# Patient Record
Sex: FEMALE | Race: WHITE | NOT HISPANIC OR LATINO | Employment: OTHER | URBAN - METROPOLITAN AREA
[De-identification: names, ages, dates, MRNs, and addresses within clinical notes are randomized per-mention and may not be internally consistent; named-entity substitution may affect disease eponyms.]

---

## 2017-01-03 ENCOUNTER — TELEPHONE (OUTPATIENT)
Dept: GYNECOLOGIC ONCOLOGY | Facility: CLINIC | Age: 62
End: 2017-01-03

## 2017-01-03 NOTE — TELEPHONE ENCOUNTER
Patient was called by Extern Student Case Pelaez. Patient was informed of normal bone density results. V/u and was grateful of results.

## 2017-01-18 RX ORDER — ESTRADIOL 0.1 MG/D
FILM, EXTENDED RELEASE TRANSDERMAL
OUTPATIENT
Start: 2017-01-18

## 2017-01-24 RX ORDER — FLUOXETINE 10 MG/1
CAPSULE ORAL
Qty: 90 CAPSULE | OUTPATIENT
Start: 2017-01-24

## 2017-07-17 RX ORDER — FLUOXETINE 10 MG/1
10 CAPSULE ORAL DAILY
Qty: 90 CAPSULE | Refills: 4 | Status: SHIPPED | OUTPATIENT
Start: 2017-07-17 | End: 2018-09-24 | Stop reason: SDUPTHER

## 2017-07-17 RX ORDER — ESTRADIOL 0.1 MG/D
1 FILM, EXTENDED RELEASE TRANSDERMAL 2 TIMES WEEKLY
Qty: 24 PATCH | Refills: 4 | Status: SHIPPED | OUTPATIENT
Start: 2017-07-17 | End: 2018-07-24 | Stop reason: SDUPTHER

## 2017-09-26 ENCOUNTER — OFFICE VISIT (OUTPATIENT)
Dept: FAMILY MEDICINE CLINIC | Facility: CLINIC | Age: 62
End: 2017-09-26

## 2017-09-26 VITALS
HEART RATE: 86 BPM | WEIGHT: 154 LBS | SYSTOLIC BLOOD PRESSURE: 150 MMHG | TEMPERATURE: 98.3 F | OXYGEN SATURATION: 97 % | RESPIRATION RATE: 20 BRPM | BODY MASS INDEX: 28.34 KG/M2 | HEIGHT: 62 IN | DIASTOLIC BLOOD PRESSURE: 92 MMHG

## 2017-09-26 DIAGNOSIS — R05.9 COUGH: ICD-10-CM

## 2017-09-26 DIAGNOSIS — E03.9 HYPOTHYROIDISM, UNSPECIFIED TYPE: ICD-10-CM

## 2017-09-26 DIAGNOSIS — I10 ESSENTIAL HYPERTENSION: ICD-10-CM

## 2017-09-26 DIAGNOSIS — M19.90 ARTHRITIS: ICD-10-CM

## 2017-09-26 DIAGNOSIS — R53.83 FATIGUE, UNSPECIFIED TYPE: ICD-10-CM

## 2017-09-26 DIAGNOSIS — Z11.59 NEED FOR HEPATITIS C SCREENING TEST: ICD-10-CM

## 2017-09-26 DIAGNOSIS — J01.00 ACUTE MAXILLARY SINUSITIS, RECURRENCE NOT SPECIFIED: Primary | ICD-10-CM

## 2017-09-26 DIAGNOSIS — E78.5 HYPERLIPIDEMIA, UNSPECIFIED HYPERLIPIDEMIA TYPE: ICD-10-CM

## 2017-09-26 DIAGNOSIS — E55.9 HYPOVITAMINOSIS D: ICD-10-CM

## 2017-09-26 PROBLEM — F41.1 ANXIETY, GENERALIZED: Status: ACTIVE | Noted: 2017-09-26

## 2017-09-26 PROBLEM — M50.30 DDD (DEGENERATIVE DISC DISEASE), CERVICAL: Status: ACTIVE | Noted: 2017-09-26

## 2017-09-26 LAB
25(OH)D3 SERPL-MCNC: 36.8 NG/ML
ALBUMIN SERPL-MCNC: 4.4 G/DL (ref 3.2–4.8)
ALBUMIN/GLOB SERPL: 2 G/DL (ref 1.5–2.5)
ALP SERPL-CCNC: 74 U/L (ref 25–100)
ALT SERPL W P-5'-P-CCNC: 30 U/L (ref 7–40)
ANION GAP SERPL CALCULATED.3IONS-SCNC: 12 MMOL/L (ref 3–11)
ARTICHOKE IGE QN: 127 MG/DL (ref 0–130)
AST SERPL-CCNC: 24 U/L (ref 0–33)
BILIRUB BLD-MCNC: NEGATIVE MG/DL
BILIRUB SERPL-MCNC: 0.6 MG/DL (ref 0.3–1.2)
BUN BLD-MCNC: 11 MG/DL (ref 9–23)
BUN/CREAT SERPL: 18.3 (ref 7–25)
CALCIUM SPEC-SCNC: 9.3 MG/DL (ref 8.7–10.4)
CHLORIDE SERPL-SCNC: 105 MMOL/L (ref 99–109)
CHOLEST SERPL-MCNC: 200 MG/DL (ref 0–200)
CLARITY, POC: CLEAR
CO2 SERPL-SCNC: 21 MMOL/L (ref 20–31)
COLOR UR: YELLOW
CREAT BLD-MCNC: 0.6 MG/DL (ref 0.6–1.3)
CRP SERPL-MCNC: 0.44 MG/DL (ref 0–1)
DEPRECATED RDW RBC AUTO: 43.7 FL (ref 37–54)
ERYTHROCYTE [DISTWIDTH] IN BLOOD BY AUTOMATED COUNT: 12.4 % (ref 11.3–14.5)
ERYTHROCYTE [SEDIMENTATION RATE] IN BLOOD: 17 MM/HR (ref 0–30)
GFR SERPL CREATININE-BSD FRML MDRD: 102 ML/MIN/1.73
GLOBULIN UR ELPH-MCNC: 2.2 GM/DL
GLUCOSE BLD-MCNC: 88 MG/DL (ref 70–100)
GLUCOSE UR STRIP-MCNC: NEGATIVE MG/DL
HBA1C MFR BLD: 5.5 % (ref 4.8–5.6)
HCT VFR BLD AUTO: 42.1 % (ref 34.5–44)
HCV AB SER DONR QL: NORMAL
HDLC SERPL-MCNC: 64 MG/DL (ref 40–60)
HGB BLD-MCNC: 13.8 G/DL (ref 11.5–15.5)
KETONES UR QL: NEGATIVE
LEUKOCYTE EST, POC: NEGATIVE
MCH RBC QN AUTO: 31.3 PG (ref 27–31)
MCHC RBC AUTO-ENTMCNC: 32.8 G/DL (ref 32–36)
MCV RBC AUTO: 95.5 FL (ref 80–99)
NITRITE UR-MCNC: NEGATIVE MG/ML
PH UR: 7 [PH] (ref 5–8)
PLATELET # BLD AUTO: 235 10*3/MM3 (ref 150–450)
PMV BLD AUTO: 10.8 FL (ref 6–12)
POTASSIUM BLD-SCNC: 4.2 MMOL/L (ref 3.5–5.5)
PROT SERPL-MCNC: 6.6 G/DL (ref 5.7–8.2)
PROT UR STRIP-MCNC: NEGATIVE MG/DL
RBC # BLD AUTO: 4.41 10*6/MM3 (ref 3.89–5.14)
RBC # UR STRIP: NEGATIVE /UL
SODIUM BLD-SCNC: 138 MMOL/L (ref 132–146)
SP GR UR: 1.02 (ref 1–1.03)
TRIGL SERPL-MCNC: 129 MG/DL (ref 0–150)
TSH SERPL DL<=0.05 MIU/L-ACNC: 2.86 MIU/ML (ref 0.35–5.35)
UROBILINOGEN UR QL: NORMAL
VIT B12 BLD-MCNC: 249 PG/ML (ref 211–911)
WBC NRBC COR # BLD: 6.41 10*3/MM3 (ref 3.5–10.8)

## 2017-09-26 PROCEDURE — 86803 HEPATITIS C AB TEST: CPT | Performed by: NURSE PRACTITIONER

## 2017-09-26 PROCEDURE — 82607 VITAMIN B-12: CPT | Performed by: NURSE PRACTITIONER

## 2017-09-26 PROCEDURE — 86431 RHEUMATOID FACTOR QUANT: CPT | Performed by: NURSE PRACTITIONER

## 2017-09-26 PROCEDURE — 80053 COMPREHEN METABOLIC PANEL: CPT | Performed by: NURSE PRACTITIONER

## 2017-09-26 PROCEDURE — 99214 OFFICE O/P EST MOD 30 MIN: CPT | Performed by: NURSE PRACTITIONER

## 2017-09-26 PROCEDURE — 85027 COMPLETE CBC AUTOMATED: CPT | Performed by: NURSE PRACTITIONER

## 2017-09-26 PROCEDURE — 84443 ASSAY THYROID STIM HORMONE: CPT | Performed by: NURSE PRACTITIONER

## 2017-09-26 PROCEDURE — 86140 C-REACTIVE PROTEIN: CPT | Performed by: NURSE PRACTITIONER

## 2017-09-26 PROCEDURE — 83036 HEMOGLOBIN GLYCOSYLATED A1C: CPT | Performed by: NURSE PRACTITIONER

## 2017-09-26 PROCEDURE — 85652 RBC SED RATE AUTOMATED: CPT | Performed by: NURSE PRACTITIONER

## 2017-09-26 PROCEDURE — 81003 URINALYSIS AUTO W/O SCOPE: CPT | Performed by: NURSE PRACTITIONER

## 2017-09-26 PROCEDURE — 82306 VITAMIN D 25 HYDROXY: CPT | Performed by: NURSE PRACTITIONER

## 2017-09-26 PROCEDURE — 80061 LIPID PANEL: CPT | Performed by: NURSE PRACTITIONER

## 2017-09-26 PROCEDURE — 36415 COLL VENOUS BLD VENIPUNCTURE: CPT | Performed by: NURSE PRACTITIONER

## 2017-09-26 RX ORDER — HYDROCHLOROTHIAZIDE 12.5 MG/1
12.5 CAPSULE, GELATIN COATED ORAL DAILY
Qty: 30 CAPSULE | Refills: 1 | Status: SHIPPED | OUTPATIENT
Start: 2017-09-26 | End: 2017-10-17 | Stop reason: SDUPTHER

## 2017-09-26 RX ORDER — PREDNISONE 10 MG/1
TABLET ORAL SEE ADMIN INSTRUCTIONS
Qty: 21 TABLET | Refills: 0 | Status: SHIPPED | OUTPATIENT
Start: 2017-09-26 | End: 2017-10-02

## 2017-09-26 RX ORDER — AZELASTINE 1 MG/ML
2 SPRAY, METERED NASAL 2 TIMES DAILY
Qty: 1 EACH | Refills: 0 | Status: SHIPPED | OUTPATIENT
Start: 2017-09-26 | End: 2018-10-25

## 2017-09-26 RX ORDER — BENZONATATE 200 MG/1
200 CAPSULE ORAL 3 TIMES DAILY PRN
Qty: 30 CAPSULE | Refills: 0 | Status: SHIPPED | OUTPATIENT
Start: 2017-09-26 | End: 2017-10-03

## 2017-09-26 RX ORDER — AZITHROMYCIN 250 MG/1
TABLET, FILM COATED ORAL
Qty: 6 TABLET | Refills: 0 | Status: SHIPPED | OUTPATIENT
Start: 2017-09-26 | End: 2017-10-01

## 2017-09-26 NOTE — PATIENT INSTRUCTIONS
"Low-Sodium Eating Plan  Sodium raises blood pressure and causes water to be held in the body. Getting less sodium from food will help lower your blood pressure, reduce any swelling, and protect your heart, liver, and kidneys. We get sodium by adding salt (sodium chloride) to food. Most of our sodium comes from canned, boxed, and frozen foods. Restaurant foods, fast foods, and pizza are also very high in sodium. Even if you take medicine to lower your blood pressure or to reduce fluid in your body, getting less sodium from your food is important.  WHAT IS MY PLAN?  Most people should limit their sodium intake to 2,300 mg a day. Your health care provider recommends that you limit your sodium intake to __________ a day.   WHAT DO I NEED TO KNOW ABOUT THIS EATING PLAN?  For the low-sodium eating plan, you will follow these general guidelines:  Choose foods with a % Daily Value for sodium of less than 5% (as listed on the food label).    Use salt-free seasonings or herbs instead of table salt or sea salt.    Check with your health care provider or pharmacist before using salt substitutes.    Eat fresh foods.  Eat more vegetables and fruits.  Limit canned vegetables. If you do use them, rinse them well to decrease the sodium.    Limit cheese to 1 oz (28 g) per day.     Eat lower-sodium products, often labeled as \"lower sodium\" or \"no salt added.\"  Avoid foods that contain monosodium glutamate (MSG). MSG is sometimes added to Chinese food and some canned foods.    Check food labels (Nutrition Facts labels) on foods to learn how much sodium is in one serving.  Eat more home-cooked food and less restaurant, buffet, and fast food.   When eating at a restaurant, ask that your food be prepared with less salt, or no salt if possible.    HOW DO I READ FOOD LABELS FOR SODIUM INFORMATION?  The Nutrition Facts label lists the amount of sodium in one serving of the food. If you eat more than one serving, you must multiply the listed " amount of sodium by the number of servings.  Food labels may also identify foods as:  Sodium free--Less than 5 mg in a serving.  Very low sodium--35 mg or less in a serving.  Low sodium--140 mg or less in a serving.  Light in sodium--50% less sodium in a serving. For example, if a food that usually has 300 mg of sodium is changed to become light in sodium, it will have 150 mg of sodium.  Reduced sodium--25% less sodium in a serving. For example, if a food that usually has 400 mg of sodium is changed to reduced sodium, it will have 300 mg of sodium.  WHAT FOODS CAN I EAT?  Grains   Low-sodium cereals, including oats, puffed wheat and rice, and shredded wheat cereals. Low-sodium crackers. Unsalted rice and pasta. Lower-sodium bread.   Vegetables   Frozen or fresh vegetables. Low-sodium or reduced-sodium canned vegetables. Low-sodium or reduced-sodium tomato sauce and paste. Low-sodium or reduced-sodium tomato and vegetable juices.   Fruits   Fresh, frozen, and canned fruit. Fruit juice.   Meat and Other Protein Products   Low-sodium canned tuna and salmon. Fresh or frozen meat, poultry, seafood, and fish. Lamb. Unsalted nuts. Dried beans, peas, and lentils without added salt. Unsalted canned beans. Homemade soups without salt. Eggs.   Dairy   Milk. Soy milk. Ricotta cheese. Low-sodium or reduced-sodium cheeses. Yogurt.   Condiments   Fresh and dried herbs and spices. Salt-free seasonings. Onion and garlic powders. Low-sodium varieties of mustard and ketchup. Fresh or refrigerated horseradish. Lemon juice.   Fats and Oils    Reduced-sodium salad dressings. Unsalted butter.    Other   Unsalted popcorn and pretzels.   The items listed above may not be a complete list of recommended foods or beverages. Contact your dietitian for more options.  WHAT FOODS ARE NOT RECOMMENDED?  Grains   Instant hot cereals. Bread stuffing, pancake, and biscuit mixes. Croutons. Seasoned rice or pasta mixes. Noodle soup cups. Boxed or frozen  macaroni and cheese. Self-rising flour. Regular salted crackers.  Vegetables   Regular canned vegetables. Regular canned tomato sauce and paste. Regular tomato and vegetable juices. Frozen vegetables in sauces. Salted French fries. Olives. Pickles. Relishes. Sauerkraut. Salsa.  Meat and Other Protein Products   Salted, canned, smoked, spiced, or pickled meats, seafood, or fish. Yip, ham, sausage, hot dogs, corned beef, chipped beef, and packaged luncheon meats. Salt pork. Jerky. Pickled herring. Anchovies, regular canned tuna, and sardines. Salted nuts.  Dairy   Processed cheese and cheese spreads. Cheese curds. Blue cheese and cottage cheese. Buttermilk.   Condiments   Onion and garlic salt, seasoned salt, table salt, and sea salt. Canned and packaged gravies. Worcestershire sauce. Tartar sauce. Barbecue sauce. Teriyaki sauce. Soy sauce, including reduced sodium. Steak sauce. Fish sauce. Oyster sauce. Cocktail sauce. Horseradish that you find on the shelf. Regular ketchup and mustard. Meat flavorings and tenderizers. Bouillon cubes. Hot sauce. Tabasco sauce. Marinades. Taco seasonings. Relishes.  Fats and Oils    Regular salad dressings. Salted butter. Margarine. Ghee. Yip fat.   Other   Potato and tortilla chips. Corn chips and puffs. Salted popcorn and pretzels. Canned or dried soups. Pizza. Frozen entrees and pot pies.    The items listed above may not be a complete list of foods and beverages to avoid. Contact your dietitian for more information.     This information is not intended to replace advice given to you by your health care provider. Make sure you discuss any questions you have with your health care provider.     Document Released: 06/09/2003 Document Revised: 01/08/2016 Document Reviewed: 10/22/2014  [employer] Interactive Patient Education ©2017 [employer]  Hypertension  Hypertension, commonly called high blood pressure, is when the force of blood pumping through your arteries is too strong.  Your arteries are the blood vessels that carry blood from your heart throughout your body. A blood pressure reading consists of a higher number over a lower number, such as 110/72. The higher number (systolic) is the pressure inside your arteries when your heart pumps. The lower number (diastolic) is the pressure inside your arteries when your heart relaxes. Ideally you want your blood pressure below 120/80.  Hypertension forces your heart to work harder to pump blood. Your arteries may become narrow or stiff. Having untreated or uncontrolled hypertension can cause heart attack, stroke, kidney disease, and other problems.  RISK FACTORS  Some risk factors for high blood pressure are controllable. Others are not.   Risk factors you cannot control include:   Race. You may be at higher risk if you are .  Age. Risk increases with age.  Gender. Men are at higher risk than women before age 45 years. After age 65, women are at higher risk than men.  Risk factors you can control include:  Not getting enough exercise or physical activity.  Being overweight.  Getting too much fat, sugar, calories, or salt in your diet.  Drinking too much alcohol.  SIGNS AND SYMPTOMS  Hypertension does not usually cause signs or symptoms. Extremely high blood pressure (hypertensive crisis) may cause headache, anxiety, shortness of breath, and nosebleed.  DIAGNOSIS  To check if you have hypertension, your health care provider will measure your blood pressure while you are seated, with your arm held at the level of your heart. It should be measured at least twice using the same arm. Certain conditions can cause a difference in blood pressure between your right and left arms. A blood pressure reading that is higher than normal on one occasion does not mean that you need treatment. If it is not clear whether you have high blood pressure, you may be asked to return on a different day to have your blood pressure checked again. Or, you  may be asked to monitor your blood pressure at home for 1 or more weeks.  TREATMENT  Treating high blood pressure includes making lifestyle changes and possibly taking medicine. Living a healthy lifestyle can help lower high blood pressure. You may need to change some of your habits.  Lifestyle changes may include:  Following the DASH diet. This diet is high in fruits, vegetables, and whole grains. It is low in salt, red meat, and added sugars.  Keep your sodium intake below 2,300 mg per day.  Getting at least 30-45 minutes of aerobic exercise at least 4 times per week.  Losing weight if necessary.  Not smoking.  Limiting alcoholic beverages.  Learning ways to reduce stress.  Your health care provider may prescribe medicine if lifestyle changes are not enough to get your blood pressure under control, and if one of the following is true:  You are 18-59 years of age and your systolic blood pressure is above 140.  You are 60 years of age or older, and your systolic blood pressure is above 150.  Your diastolic blood pressure is above 90.  You have diabetes, and your systolic blood pressure is over 140 or your diastolic blood pressure is over 90.  You have kidney disease and your blood pressure is above 140/90.  You have heart disease and your blood pressure is above 140/90.  Your personal target blood pressure may vary depending on your medical conditions, your age, and other factors.  HOME CARE INSTRUCTIONS  Have your blood pressure rechecked as directed by your health care provider.    Take medicines only as directed by your health care provider. Follow the directions carefully. Blood pressure medicines must be taken as prescribed. The medicine does not work as well when you skip doses. Skipping doses also puts you at risk for problems.  Do not smoke.    Monitor your blood pressure at home as directed by your health care provider.   SEEK MEDICAL CARE IF:   You think you are having a reaction to medicines taken.  You  have recurrent headaches or feel dizzy.  You have swelling in your ankles.  You have trouble with your vision.  SEEK IMMEDIATE MEDICAL CARE IF:  You develop a severe headache or confusion.  You have unusual weakness, numbness, or feel faint.  You have severe chest or abdominal pain.  You vomit repeatedly.  You have trouble breathing.  MAKE SURE YOU:   Understand these instructions.  Will watch your condition.  Will get help right away if you are not doing well or get worse.     This information is not intended to replace advice given to you by your health care provider. Make sure you discuss any questions you have with your health care provider.     Document Released: 12/18/2006 Document Revised: 05/03/2016 Document Reviewed: 10/10/2014  [employer] Interactive Patient Education ©2017 [employer]  Fatigue  Fatigue is feeling tired all of the time, a lack of energy, or a lack of motivation. Occasional or mild fatigue is often a normal response to activity or life in general. However, long-lasting (chronic) or extreme fatigue may indicate an underlying medical condition.  HOME CARE INSTRUCTIONS   Watch your fatigue for any changes. The following actions may help to lessen any discomfort you are feeling:  Talk to your health care provider about how much sleep you need each night. Try to get the required amount every night.  Take medicines only as directed by your health care provider.  Eat a healthy and nutritious diet. Ask your health care provider if you need help changing your diet.  Drink enough fluid to keep your urine clear or pale yellow.  Practice ways of relaxing, such as yoga, meditation, massage therapy, or acupuncture.  Exercise regularly.    Change situations that cause you stress. Try to keep your work and personal routine reasonable.  Do not abuse illegal drugs.  Limit alcohol intake to no more than 1 drink per day for nonpregnant women and 2 drinks per day for men. One drink equals 12 ounces of beer, 5  ounces of wine, or 1½ ounces of hard liquor.  Take a multivitamin, if directed by your health care provider.  SEEK MEDICAL CARE IF:   Your fatigue does not get better.  You have a fever.    You have unintentional weight loss or gain.  You have headaches.    You have difficulty:    Falling asleep.  Sleeping throughout the night.  You feel angry, guilty, anxious, or sad.     You are unable to have a bowel movement (constipation).    You skin is dry.     Your legs or another part of your body is swollen.    SEEK IMMEDIATE MEDICAL CARE IF:   You feel confused.    Your vision is blurry.  You feel faint or pass out.    You have a severe headache.    You have severe abdominal, pelvic, or back pain.    You have chest pain, shortness of breath, or an irregular or fast heartbeat.    You are unable to urinate or you urinate less than normal.    You develop abnormal bleeding, such as bleeding from the rectum, vagina, nose, lungs, or nipples.  You vomit blood.     You have thoughts about harming yourself or committing suicide.    You are worried that you might harm someone else.       This information is not intended to replace advice given to you by your health care provider. Make sure you discuss any questions you have with your health care provider.     Document Released: 10/14/2008 Document Revised: 04/10/2017 Document Reviewed: 04/21/2015  [employer] Interactive Patient Education ©2017 [employer]  Food Choices to Lower Your Triglycerides  Triglycerides are a type of fat in your blood. High levels of triglycerides can increase the risk of heart disease and stroke. If your triglyceride levels are high, the foods you eat and your eating habits are very important. Choosing the right foods can help lower your triglycerides.   WHAT GENERAL GUIDELINES DO I NEED TO FOLLOW?  Lose weight if you are overweight.    Limit or avoid alcohol.    Fill one half of your plate with vegetables and green salads.    Limit fruit to two servings a  "day. Choose fruit instead of juice.    Make one fourth of your plate whole grains. Look for the word \"whole\" as the first word in the ingredient list.  Fill one fourth of your plate with lean protein foods.  Enjoy fatty fish (such as salmon, mackerel, sardines, and tuna) three times a week.    Choose healthy fats.    Limit foods high in starch and sugar.  Eat more home-cooked food and less restaurant, buffet, and fast food.  Limit fried foods.  Cook foods using methods other than frying.  Limit saturated fats.  Check ingredient lists to avoid foods with partially hydrogenated oils (trans fats) in them.  WHAT FOODS CAN I EAT?   Grains  Whole grains, such as whole wheat or whole grain breads, crackers, cereals, and pasta. Unsweetened oatmeal, bulgur, barley, quinoa, or brown rice. Corn or whole wheat flour tortillas.   Vegetables  Fresh or frozen vegetables (raw, steamed, roasted, or grilled). Green salads.  Fruits  All fresh, canned (in natural juice), or frozen fruits.  Meat and Other Protein Products  Ground beef (85% or leaner), grass-fed beef, or beef trimmed of fat. Skinless chicken or turkey. Ground chicken or turkey. Pork trimmed of fat. All fish and seafood. Eggs. Dried beans, peas, or lentils. Unsalted nuts or seeds. Unsalted canned or dry beans.  Dairy  Low-fat dairy products, such as skim or 1% milk, 2% or reduced-fat cheeses, low-fat ricotta or cottage cheese, or plain low-fat yogurt.  Fats and Oils  Tub margarines without trans fats. Light or reduced-fat mayonnaise and salad dressings. Avocado. Safflower, olive, or canola oils. Natural peanut or almond butter.  The items listed above may not be a complete list of recommended foods or beverages. Contact your dietitian for more options.  WHAT FOODS ARE NOT RECOMMENDED?   Grains  White bread. White pasta. White rice. Cornbread. Bagels, pastries, and croissants. Crackers that contain trans fat.  Vegetables  White potatoes. Corn. Creamed or fried vegetables. " Vegetables in a cheese sauce.  Fruits  Dried fruits. Canned fruit in light or heavy syrup. Fruit juice.  Meat and Other Protein Products  Fatty cuts of meat. Ribs, chicken wings, melendez, sausage, bologna, salami, chitterlings, fatback, hot dogs, bratwurst, and packaged luncheon meats.  Dairy  Whole or 2% milk, cream, half-and-half, and cream cheese. Whole-fat or sweetened yogurt. Full-fat cheeses. Nondairy creamers and whipped toppings. Processed cheese, cheese spreads, or cheese curds.  Sweets and Desserts  Corn syrup, sugars, honey, and molasses. Candy. Jam and jelly. Syrup. Sweetened cereals. Cookies, pies, cakes, donuts, muffins, and ice cream.  Fats and Oils  Butter, stick margarine, lard, shortening, ghee, or melendez fat. Coconut, palm kernel, or palm oils.  Beverages  Alcohol. Sweetened drinks (such as sodas, lemonade, and fruit drinks or punches).  The items listed above may not be a complete list of foods and beverages to avoid. Contact your dietitian for more information.     This information is not intended to replace advice given to you by your health care provider. Make sure you discuss any questions you have with your health care provider.     Document Released: 10/05/2005 Document Revised: 01/08/2016 Document Reviewed: 10/22/2014  [employer] Interactive Patient Education ©2017 [employer]  Hypothyroidism  Hypothyroidism is a disorder of the thyroid. The thyroid is a large gland that is located in the lower front of the neck. The thyroid releases hormones that control how the body works. With hypothyroidism, the thyroid does not make enough of these hormones.  CAUSES  Causes of hypothyroidism may include:  Viral infections.  Pregnancy.  Your own defense system (immune system) attacking your thyroid.  Certain medicines.  Birth defects.  Past radiation treatments to your head or neck.  Past treatment with radioactive iodine.  Past surgical removal of part or all of your thyroid.  Problems with the gland  that is located in the center of your brain (pituitary).  SIGNS AND SYMPTOMS  Signs and symptoms of hypothyroidism may include:  Feeling as though you have no energy (lethargy).  Inability to tolerate cold.  Weight gain that is not explained by a change in diet or exercise habits.  Dry skin.  Coarse hair.  Menstrual irregularity.  Slowing of thought processes.  Constipation.  Sadness or depression.  DIAGNOSIS   Your health care provider may diagnose hypothyroidism with blood tests and ultrasound tests.  TREATMENT  Hypothyroidism is treated with medicine that replaces the hormones that your body does not make. After you begin treatment, it may take several weeks for symptoms to go away.  HOME CARE INSTRUCTIONS   Take medicines only as directed by your health care provider.  If you start taking any new medicines, tell your health care provider.  Keep all follow-up visits as directed by your health care provider. This is important. As your condition improves, your dosage needs may change. You will need to have blood tests regularly so that your health care provider can watch your condition.  SEEK MEDICAL CARE IF:  Your symptoms do not get better with treatment.  You are taking thyroid replacement medicine and:  You sweat excessively.  You have tremors.  You feel anxious.  You lose weight rapidly.  You cannot tolerate heat.  You have emotional swings.  You have diarrhea.  You feel weak.  SEEK IMMEDIATE MEDICAL CARE IF:   You develop chest pain.  You develop an irregular heartbeat.  You develop a rapid heartbeat.     This information is not intended to replace advice given to you by your health care provider. Make sure you discuss any questions you have with your health care provider.     Document Released: 12/18/2006 Document Revised: 01/08/2016 Document Reviewed: 05/05/2015  [employer] Interactive Patient Education ©2017 [employer]  Benzonatate capsules  What is this medicine?  BENZONATATE (katie GLENROY na almazan) is used to  treat cough.  This medicine may be used for other purposes; ask your health care provider or pharmacist if you have questions.  COMMON BRAND NAME(S): Tessalon Perles Leonorheidi  What should I tell my health care provider before I take this medicine?  They need to know if you have any of these conditions:  -kidney or liver disease  -an unusual or allergic reaction to benzonatate, anesthetics, other medicines, foods, dyes, or preservatives  -pregnant or trying to get pregnant  -breast-feeding  How should I use this medicine?  Take this medicine by mouth with a glass of water. Follow the directions on the prescription label. Avoid breaking, chewing, or sucking the capsule, as this can cause serious side effects. Take your medicine at regular intervals. Do not take your medicine more often than directed.  Talk to your pediatrician regarding the use of this medicine in children. While this drug may be prescribed for children as young as 10 years old for selected conditions, precautions do apply.  Overdosage: If you think you have taken too much of this medicine contact a poison control center or emergency room at once.  NOTE: This medicine is only for you. Do not share this medicine with others.  What if I miss a dose?  If you miss a dose, take it as soon as you can. If it is almost time for your next dose, take only that dose. Do not take double or extra doses.  What may interact with this medicine?  Do not take this medicine with any of the following medications:  -MAOIs like Carbex, Eldepryl, Marplan, Nardil, and Parnate  This list may not describe all possible interactions. Give your health care provider a list of all the medicines, herbs, non-prescription drugs, or dietary supplements you use. Also tell them if you smoke, drink alcohol, or use illegal drugs. Some items may interact with your medicine.  What should I watch for while using this medicine?  Tell your doctor if your symptoms do not improve or if they get  worse. If you have a high fever, skin rash, or headache, see your health care professional.  You may get drowsy or dizzy. Do not drive, use machinery, or do anything that needs mental alertness until you know how this medicine affects you. Do not sit or stand up quickly, especially if you are an older patient. This reduces the risk of dizzy or fainting spells.  What side effects may I notice from receiving this medicine?  Side effects that you should report to your doctor or health care professional as soon as possible:  -allergic reactions like skin rash, itching or hives, swelling of the face, lips, or tongue  -breathing problems  -chest pain  -confusion or hallucinations  -irregular heartbeat  -numbness of mouth or throat  -seizures  Side effects that usually do not require medical attention (report to your doctor or health care professional if they continue or are bothersome):  -burning feeling in the eyes  -constipation  -headache  -nasal congestion  -stomach upset  This list may not describe all possible side effects. Call your doctor for medical advice about side effects. You may report side effects to FDA at 7-406-FDA-8892.  Where should I keep my medicine?  Keep out of the reach of children.  Store at room temperature between 15 and 30 degrees C (59 and 86 degrees F). Keep tightly closed. Protect from light and moisture. Throw away any unused medicine after the expiration date.  NOTE: This sheet is a summary. It may not cover all possible information. If you have questions about this medicine, talk to your doctor, pharmacist, or health care provider.     © 2017, [employer]. (2009-03-18 14:52:56)  Cough, Adult  Coughing is a reflex that clears your throat and your airways. Coughing helps to heal and protect your lungs. It is normal to cough occasionally, but a cough that happens with other symptoms or lasts a long time may be a sign of a condition that needs treatment. A cough may last only 2-3  weeks (acute), or it may last longer than 8 weeks (chronic).  CAUSES  Coughing is commonly caused by:  · Breathing in substances that irritate your lungs.  · A viral or bacterial respiratory infection.  · Allergies.  · Asthma.  · Postnasal drip.  · Smoking.  · Acid backing up from the stomach into the esophagus (gastroesophageal reflux).  · Certain medicines.  · Chronic lung problems, including COPD (or rarely, lung cancer).  · Other medical conditions such as heart failure.  HOME CARE INSTRUCTIONS   Pay attention to any changes in your symptoms. Take these actions to help with your discomfort:  · Take medicines only as told by your health care provider.    If you were prescribed an antibiotic medicine, take it as told by your health care provider. Do not stop taking the antibiotic even if you start to feel better.    Talk with your health care provider before you take a cough suppressant medicine.  · Drink enough fluid to keep your urine clear or pale yellow.  · If the air is dry, use a cold steam vaporizer or humidifier in your bedroom or your home to help loosen secretions.  · Avoid anything that causes you to cough at work or at home.  · If your cough is worse at night, try sleeping in a semi-upright position.  · Avoid cigarette smoke. If you smoke, quit smoking. If you need help quitting, ask your health care provider.  · Avoid caffeine.  · Avoid alcohol.  · Rest as needed.  SEEK MEDICAL CARE IF:   · You have new symptoms.  · You cough up pus.  · Your cough does not get better after 2-3 weeks, or your cough gets worse.  · You cannot control your cough with suppressant medicines and you are losing sleep.  · You develop pain that is getting worse or pain that is not controlled with pain medicines.  · You have a fever.  · You have unexplained weight loss.  · You have night sweats.  SEEK IMMEDIATE MEDICAL CARE IF:  · You cough up blood.  · You have difficulty breathing.  · Your heartbeat is very fast.     This  information is not intended to replace advice given to you by your health care provider. Make sure you discuss any questions you have with your health care provider.     Document Released: 06/15/2012 Document Revised: 09/07/2016 Document Reviewed: 02/24/2016  [employer] Interactive Patient Education ©2017 [employer]  Sinusitis, Adult  Sinusitis is soreness and inflammation of your sinuses. Sinuses are hollow spaces in the bones around your face. Your sinuses are located:  · Around your eyes.  · In the middle of your forehead.  · Behind your nose.  · In your cheekbones.  Your sinuses and nasal passages are lined with a stringy fluid (mucus). Mucus normally drains out of your sinuses. When your nasal tissues become inflamed or swollen, the mucus can become trapped or blocked so air cannot flow through your sinuses. This allows bacteria, viruses, and funguses to grow, which leads to infection.  Sinusitis can develop quickly and last for 7-10 days (acute) or for more than 12 weeks (chronic). Sinusitis often develops after a cold.  CAUSES  This condition is caused by anything that creates swelling in the sinuses or stops mucus from draining, including:  · Allergies.  · Asthma.  · Bacterial or viral infection.  · Abnormally shaped bones between the nasal passages.  · Nasal growths that contain mucus (nasal polyps).  · Narrow sinus openings.  · Pollutants, such as chemicals or irritants in the air.  · A foreign object stuck in the nose.  · A fungal infection. This is rare.  RISK FACTORS  The following factors may make you more likely to develop this condition:  · Having allergies or asthma.  · Having had a recent cold or respiratory tract infection.  · Having structural deformities or blockages in your nose or sinuses.  · Having a weak immune system.  · Doing a lot of swimming or diving.  · Overusing nasal sprays.  · Smoking.  SYMPTOMS  The main symptoms of this condition are pain and a feeling of pressure around the  affected sinuses. Other symptoms include:  · Upper toothache.  · Earache.  · Headache.  · Bad breath.  · Decreased sense of smell and taste.  · A cough that may get worse at night.  · Fatigue.  · Fever.  · Thick drainage from your nose. The drainage is often green and it may contain pus (purulent).  · Stuffy nose or congestion.  · Postnasal drip. This is when extra mucus collects in the throat or back of the nose.  · Swelling and warmth over the affected sinuses.  · Sore throat.  · Sensitivity to light.  DIAGNOSIS  This condition is diagnosed based on symptoms, a medical history, and a physical exam. To find out if your condition is acute or chronic, your health care provider may:  · Look in your nose for signs of nasal polyps.  · Tap over the affected sinus to check for signs of infection.  · View the inside of your sinuses using an imaging device that has a light attached (endoscope).  If your health care provider suspects that you have chronic sinusitis, you may also:  · Be tested for allergies.  · Have a sample of mucus taken from your nose (nasal culture) and checked for bacteria.  · Have a mucus sample examined to see if your sinusitis is related to an allergy.  If your sinusitis does not respond to treatment and it lasts longer than 8 weeks, you may have an MRI or CT scan to check your sinuses. These scans also help to determine how severe your infection is.  In rare cases, a bone biopsy may be done to rule out more serious types of fungal sinus disease.  TREATMENT  Treatment for sinusitis depends on the cause and whether your condition is chronic or acute. If a virus is causing your sinusitis, your symptoms will go away on their own within 10 days. You may be given medicines to relieve your symptoms, including:  · Topical nasal decongestants. They shrink swollen nasal passages and let mucus drain from your sinuses.  · Antihistamines. These drugs block inflammation that is triggered by allergies. This can  help to ease swelling in your nose and sinuses.  · Topical nasal corticosteroids. These are nasal sprays that ease inflammation and swelling in your nose and sinuses.  · Nasal saline washes. These rinses can help to get rid of thick mucus in your nose.  If your condition is caused by bacteria, you will be given an antibiotic medicine. If your condition is caused by a fungus, you will be given an antifungal medicine.  Surgery may be needed to correct underlying conditions, such as narrow nasal passages. Surgery may also be needed to remove polyps.  HOME CARE INSTRUCTIONS  Medicines  · Take, use, or apply over-the-counter and prescription medicines only as told by your health care provider. These may include nasal sprays.  · If you were prescribed an antibiotic medicine, take it as told by your health care provider. Do not stop taking the antibiotic even if you start to feel better.  Hydrate and Humidify  · Drink enough water to keep your urine clear or pale yellow. Staying hydrated will help to thin your mucus.  · Use a cool mist humidifier to keep the humidity level in your home above 50%.  · Inhale steam for 10-15 minutes, 3-4 times a day or as told by your health care provider. You can do this in the bathroom while a hot shower is running.  · Limit your exposure to cool or dry air.  Rest  · Rest as much as possible.  · Sleep with your head raised (elevated).  · Make sure to get enough sleep each night.  General Instructions  · Apply a warm, moist washcloth to your face 3-4 times a day or as told by your health care provider. This will help with discomfort.  · Wash your hands often with soap and water to reduce your exposure to viruses and other germs. If soap and water are not available, use hand .  · Do not smoke. Avoid being around people who are smoking (secondhand smoke).  · Keep all follow-up visits as told by your health care provider. This is important.  SEEK MEDICAL CARE IF:  · You have a  fever.  · Your symptoms get worse.  · Your symptoms do not improve within 10 days.  SEEK IMMEDIATE MEDICAL CARE IF:  · You have a severe headache.  · You have persistent vomiting.  · You have pain or swelling around your face or eyes.  · You have vision problems.  · You develop confusion.  · Your neck is stiff.  · You have trouble breathing.     This information is not intended to replace advice given to you by your health care provider. Make sure you discuss any questions you have with your health care provider.     Document Released: 12/18/2006 Document Revised: 04/10/2017 Document Reviewed: 10/12/2016  [employer] Interactive Patient Education ©2017 [employer]  Hydrochlorothiazide, HCTZ capsules or tablets  What is this medicine?  HYDROCHLOROTHIAZIDE (cesia droe klor oh THYE a zide) is a diuretic. It increases the amount of urine passed, which causes the body to lose salt and water. This medicine is used to treat high blood pressure. It is also reduces the swelling and water retention caused by various medical conditions, such as heart, liver, or kidney disease.  This medicine may be used for other purposes; ask your health care provider or pharmacist if you have questions.  COMMON BRAND NAME(S): Esidrix, Ezide, HydroDIURIL, Microzide, Oretic, Zide  What should I tell my health care provider before I take this medicine?  They need to know if you have any of these conditions:  -diabetes  -gout  -immune system problems, like lupus  -kidney disease or kidney stones  -liver disease  -pancreatitis  -small amount of urine or difficulty passing urine  -an unusual or allergic reaction to hydrochlorothiazide, sulfa drugs, other medicines, foods, dyes, or preservatives  -pregnant or trying to get pregnant  -breast-feeding  How should I use this medicine?  Take this medicine by mouth with a glass of water. Follow the directions on the prescription label. Take your medicine at regular intervals. Remember that you will need to  pass urine frequently after taking this medicine. Do not take your doses at a time of day that will cause you problems. Do not stop taking your medicine unless your doctor tells you to.  Talk to your pediatrician regarding the use of this medicine in children. Special care may be needed.  Overdosage: If you think you have taken too much of this medicine contact a poison control center or emergency room at once.  NOTE: This medicine is only for you. Do not share this medicine with others.  What if I miss a dose?  If you miss a dose, take it as soon as you can. If it is almost time for your next dose, take only that dose. Do not take double or extra doses.  What may interact with this medicine?  -cholestyramine  -colestipol  -digoxin  -dofetilide  -lithium  -medicines for blood pressure  -medicines for diabetes  -medicines that relax muscles for surgery  -other diuretics  -steroid medicines like prednisone or cortisone  This list may not describe all possible interactions. Give your health care provider a list of all the medicines, herbs, non-prescription drugs, or dietary supplements you use. Also tell them if you smoke, drink alcohol, or use illegal drugs. Some items may interact with your medicine.  What should I watch for while using this medicine?  Visit your doctor or health care professional for regular checks on your progress. Check your blood pressure as directed. Ask your doctor or health care professional what your blood pressure should be and when you should contact him or her.  You may need to be on a special diet while taking this medicine. Ask your doctor.  Check with your doctor or health care professional if you get an attack of severe diarrhea, nausea and vomiting, or if you sweat a lot. The loss of too much body fluid can make it dangerous for you to take this medicine.  You may get drowsy or dizzy. Do not drive, use machinery, or do anything that needs mental alertness until you know how this  medicine affects you. Do not stand or sit up quickly, especially if you are an older patient. This reduces the risk of dizzy or fainting spells. Alcohol may interfere with the effect of this medicine. Avoid alcoholic drinks.  This medicine may affect your blood sugar level. If you have diabetes, check with your doctor or health care professional before changing the dose of your diabetic medicine.  This medicine can make you more sensitive to the sun. Keep out of the sun. If you cannot avoid being in the sun, wear protective clothing and use sunscreen. Do not use sun lamps or tanning beds/booths.  What side effects may I notice from receiving this medicine?  Side effects that you should report to your doctor or health care professional as soon as possible:  -allergic reactions such as skin rash or itching, hives, swelling of the lips, mouth, tongue, or throat  -changes in vision  -chest pain  -eye pain  -fast or irregular heartbeat  -feeling faint or lightheaded, falls  -gout attack  -muscle pain or cramps  -pain or difficulty when passing urine  -pain, tingling, numbness in the hands or feet  -redness, blistering, peeling or loosening of the skin, including inside the mouth  -unusually weak or tired  Side effects that usually do not require medical attention (report to your doctor or health care professional if they continue or are bothersome):  -change in sex drive or performance  -dry mouth  -headache  -stomach upset  This list may not describe all possible side effects. Call your doctor for medical advice about side effects. You may report side effects to FDA at 8-225-FDA-3537.  Where should I keep my medicine?  Keep out of the reach of children.  Store at room temperature between 15 and 30 degrees C (59 and 86 degrees F). Do not freeze. Protect from light and moisture. Keep container closed tightly. Throw away any unused medicine after the expiration date.  NOTE: This sheet is a summary. It may not cover all  possible information. If you have questions about this medicine, talk to your doctor, pharmacist, or health care provider.     © 2017, [employer]. (2011-08-12 12:57:37)

## 2017-09-26 NOTE — PROGRESS NOTES
Subjective   Linn Bean is a 62 y.o. female.   Chief Complaint   Patient presents with   • Sinusitis     X 4 DAYS       Sinusitis   This is a new problem. The current episode started in the past 7 days. The problem has been gradually worsening since onset. There has been no fever. Her pain is at a severity of 3/10. The pain is mild. Associated symptoms include congestion, coughing, ear pain, headaches, a hoarse voice, sinus pressure, sneezing, a sore throat and swollen glands. Pertinent negatives include no chills, diaphoresis or neck pain. Past treatments include saline sprays (MUCINEX D). The treatment provided no relief.    Has been sick x3 days. Has purulent yellow drainage. Denies fever or chills. Spouse and co workers are sick with similar symptoms.     Went to Urgent care yesterday. Blood pressure was elevated. 167/92 at the office.     Strep test reports as negative. Received OTC list of medications to try.   Patient is here today due to feeling worse. She is planning an out of state trip this weekend.       The following portions of the patient's history were reviewed and updated as appropriate: allergies, current medications, past family history, past medical history, past social history, past surgical history and problem list.    Review of Systems   Constitutional: Positive for appetite change and fatigue. Negative for activity change, chills, diaphoresis and fever.   HENT: Positive for congestion, ear pain, hoarse voice, postnasal drip, rhinorrhea, sinus pain, sinus pressure, sneezing and sore throat.    Eyes:        Pressure behind left eye.   Respiratory: Positive for cough and chest tightness. Negative for choking and wheezing.         Occasionally productive cough - thick yellow.      Cardiovascular: Negative.  Negative for leg swelling.        Swelling to hands.      Gastrointestinal: Negative for abdominal distention, abdominal pain, anal bleeding, blood in stool, constipation, diarrhea, nausea  "and rectal pain.   Genitourinary: Negative.    Musculoskeletal: Positive for arthralgias and myalgias. Negative for neck pain.        Hands feel very tight. Had cortisone injections to three of the fingers last month.   Hx of arthritis. Parents both have crippling arthritis.      Skin: Negative.    Allergic/Immunologic: Positive for environmental allergies.   Neurological: Positive for headaches.       Objective   No Known Allergies  Visit Vitals   • /92 (BP Location: Right arm, Patient Position: Sitting, Cuff Size: Adult)   • Pulse 86   • Temp 98.3 °F (36.8 °C) (Oral)   • Resp 20   • Ht 62\" (157.5 cm)   • Wt 154 lb (69.9 kg)   • SpO2 97%   • BMI 28.17 kg/m2         Physical Exam   Constitutional: She is oriented to person, place, and time. She appears well-developed and well-nourished. She is cooperative. She appears ill.   HENT:   Head: Normocephalic.   Right Ear: Hearing, tympanic membrane, external ear and ear canal normal.   Left Ear: Hearing, tympanic membrane, external ear and ear canal normal.   Nose: Mucosal edema present. Right sinus exhibits maxillary sinus tenderness. Right sinus exhibits no frontal sinus tenderness. Left sinus exhibits maxillary sinus tenderness. Left sinus exhibits no frontal sinus tenderness.   Mouth/Throat: Uvula is midline and mucous membranes are normal. Posterior oropharyngeal erythema present. Tonsils are 0 on the right. Tonsils are 0 on the left.   Yellow purulent drainage -      Eyes: Conjunctivae and lids are normal. Pupils are equal, round, and reactive to light.   Cardiovascular: Normal rate, regular rhythm, S1 normal, S2 normal, normal heart sounds and normal pulses.    Pulmonary/Chest: Effort normal. She exhibits no tenderness.   Patient has a slight wheeze with coughing.      Abdominal: Soft.   Lymphadenopathy:     She has cervical adenopathy.   Neurological: She is alert and oriented to person, place, and time.   Skin: Skin is warm, dry and intact. No rash noted. "   Psychiatric: She has a normal mood and affect. Her behavior is normal.   Vitals reviewed.      Assessment/Plan   Linn was seen today for sinusitis.    Diagnoses and all orders for this visit:    Acute maxillary sinusitis, recurrence not specified  -     azithromycin (ZITHROMAX) 250 MG tablet; Take 2 tablets the first day, then 1 tablet daily for 4 days.  -     PredniSONE (DELTASONE) 10 MG (21) tablet pack; Take  by mouth See Admin Instructions for 6 days.  -     Comprehensive Metabolic Panel  -     CBC (No Diff)    Essential hypertension  -     hydrochlorothiazide (MICROZIDE) 12.5 MG capsule; Take 1 capsule by mouth Daily.  -     Comprehensive Metabolic Panel  -     POC Urinalysis Dipstick, Automated    Cough  -     benzonatate (TESSALON) 200 MG capsule; Take 1 capsule by mouth 3 (Three) Times a Day As Needed for Cough for up to 7 days.    Arthritis  -     Comprehensive Metabolic Panel  -     C-reactive Protein  -     Rheumatoid Factor  -     Sedimentation Rate    Hypothyroidism, unspecified type  -     Comprehensive Metabolic Panel  -     TSH    Hyperlipidemia, unspecified hyperlipidemia type  -     Comprehensive Metabolic Panel  -     Lipid Panel  -     Hemoglobin A1c    Hypovitaminosis D  -     Vitamin D 25 Hydroxy    Fatigue, unspecified type  -     Vitamin D 25 Hydroxy  -     Vitamin B12  -     POC Urinalysis Dipstick, Automated    Need for hepatitis C screening test  -     Hepatitis C Antibody    Patient is being started on HCTZ 12.5 mg po QD for blood pressure management. Discussed medication. Discussed low sodium diet.   Exercise 30 minutes 3-4 times a week.   Patient is fasting. Lipid profile drawn.  Patient has appointment with Dr. Luo on 10/17/2017.   Follow up - review labs.   Follow up blood pressure - reevaluate HCTZ.     Patient would like an influenza vaccine next appointment. Discussed Tdap and zoster vaccines.       Follow up as needed if your symptoms worsen or you fail to improve.             Ashwini Ziegler, APRN

## 2017-09-27 LAB — RHEUMATOID FACT SERPL-ACNC: NEGATIVE [IU]/ML

## 2017-10-09 DIAGNOSIS — Z80.3 FAMILY HISTORY OF BREAST CANCER: ICD-10-CM

## 2017-10-09 DIAGNOSIS — Z80.41 FAMILY HISTORY OF OVARIAN CANCER: Primary | ICD-10-CM

## 2017-10-17 ENCOUNTER — OFFICE VISIT (OUTPATIENT)
Dept: FAMILY MEDICINE CLINIC | Facility: CLINIC | Age: 62
End: 2017-10-17

## 2017-10-17 VITALS
HEIGHT: 63 IN | WEIGHT: 154.8 LBS | HEART RATE: 86 BPM | OXYGEN SATURATION: 98 % | DIASTOLIC BLOOD PRESSURE: 70 MMHG | RESPIRATION RATE: 18 BRPM | BODY MASS INDEX: 27.43 KG/M2 | SYSTOLIC BLOOD PRESSURE: 124 MMHG

## 2017-10-17 DIAGNOSIS — Z00.00 HEALTHCARE MAINTENANCE: Primary | ICD-10-CM

## 2017-10-17 DIAGNOSIS — I10 ESSENTIAL HYPERTENSION: ICD-10-CM

## 2017-10-17 PROBLEM — F41.1 ANXIETY, GENERALIZED: Status: RESOLVED | Noted: 2017-09-26 | Resolved: 2017-10-17

## 2017-10-17 PROBLEM — E78.5 HLD (HYPERLIPIDEMIA): Status: RESOLVED | Noted: 2017-09-26 | Resolved: 2017-10-17

## 2017-10-17 PROBLEM — M50.30 DDD (DEGENERATIVE DISC DISEASE), CERVICAL: Status: RESOLVED | Noted: 2017-09-26 | Resolved: 2017-10-17

## 2017-10-17 PROBLEM — E03.9 ADULT HYPOTHYROIDISM: Status: RESOLVED | Noted: 2017-09-26 | Resolved: 2017-10-17

## 2017-10-17 LAB
ANION GAP SERPL CALCULATED.3IONS-SCNC: 8 MMOL/L (ref 3–11)
BUN BLD-MCNC: 13 MG/DL (ref 9–23)
BUN/CREAT SERPL: 21.7 (ref 7–25)
CALCIUM SPEC-SCNC: 9.6 MG/DL (ref 8.7–10.4)
CHLORIDE SERPL-SCNC: 101 MMOL/L (ref 99–109)
CO2 SERPL-SCNC: 29 MMOL/L (ref 20–31)
CREAT BLD-MCNC: 0.6 MG/DL (ref 0.6–1.3)
GFR SERPL CREATININE-BSD FRML MDRD: 102 ML/MIN/1.73
GLUCOSE BLD-MCNC: 91 MG/DL (ref 70–100)
POTASSIUM BLD-SCNC: 4.2 MMOL/L (ref 3.5–5.5)
SODIUM BLD-SCNC: 138 MMOL/L (ref 132–146)

## 2017-10-17 PROCEDURE — 93000 ELECTROCARDIOGRAM COMPLETE: CPT | Performed by: FAMILY MEDICINE

## 2017-10-17 PROCEDURE — 36415 COLL VENOUS BLD VENIPUNCTURE: CPT | Performed by: FAMILY MEDICINE

## 2017-10-17 PROCEDURE — 90736 HZV VACCINE LIVE SUBQ: CPT | Performed by: FAMILY MEDICINE

## 2017-10-17 PROCEDURE — 90471 IMMUNIZATION ADMIN: CPT | Performed by: FAMILY MEDICINE

## 2017-10-17 PROCEDURE — 80048 BASIC METABOLIC PNL TOTAL CA: CPT | Performed by: FAMILY MEDICINE

## 2017-10-17 PROCEDURE — 99396 PREV VISIT EST AGE 40-64: CPT | Performed by: FAMILY MEDICINE

## 2017-10-17 RX ORDER — HYDROCHLOROTHIAZIDE 12.5 MG/1
12.5 CAPSULE, GELATIN COATED ORAL DAILY
Qty: 90 CAPSULE | Refills: 3 | Status: SHIPPED | OUTPATIENT
Start: 2017-10-17 | End: 2018-09-24 | Stop reason: SDUPTHER

## 2017-10-17 NOTE — PROGRESS NOTES
Subjective   Linn Bean is a 62 y.o. female.     History of Present Illness   She is here for her annual fasting wellness evaluation.  She just had blood work in September.  She is amendable to updating her immunizations.  She is also here to discuss her hypertension.  She is tolerating her medication well with no adverse events.  She reports improved blood pressure readings.  She continues to follow a heart healthy diet and exercise routinely.  She is needing a refill of her medication.    Review of Systems   Constitutional: Negative.    HENT: Negative.    Eyes: Negative.    Respiratory: Negative.    Cardiovascular: Negative.    Gastrointestinal: Negative.    Endocrine: Negative.    Genitourinary: Negative.    Musculoskeletal: Negative.    Skin: Negative.    Allergic/Immunologic: Negative.    Neurological: Negative.    Hematological: Negative.    Psychiatric/Behavioral: Negative.        Objective   Physical Exam   Constitutional: She is oriented to person, place, and time. She appears well-developed and well-nourished. She is cooperative.   HENT:   Head: Normocephalic and atraumatic.   Right Ear: Hearing and external ear normal.   Left Ear: Hearing and external ear normal.   Nose: Nose normal.   Mouth/Throat: Uvula is midline, oropharynx is clear and moist and mucous membranes are normal.   Eyes: Conjunctivae and EOM are normal. Pupils are equal, round, and reactive to light. No scleral icterus.   Neck: Trachea normal and normal range of motion. Neck supple. No JVD present. Carotid bruit is not present. No thyromegaly present.   Cardiovascular: Normal rate, regular rhythm, normal heart sounds and intact distal pulses.    Pulmonary/Chest: Effort normal and breath sounds normal.   Abdominal: Soft. Bowel sounds are normal. There is no hepatosplenomegaly. There is no tenderness.   Musculoskeletal: Normal range of motion.   Lymphadenopathy:     She has no cervical adenopathy.   Neurological: She is alert and oriented  to person, place, and time. She has normal strength and normal reflexes. No sensory deficit. Gait normal.   Skin: Skin is warm and dry.   Psychiatric: She has a normal mood and affect. Her speech is normal and behavior is normal. Judgment and thought content normal. Cognition and memory are normal.   Nursing note and vitals reviewed.      ECG 12 Lead  Date/Time: 10/17/2017 9:10 AM  Performed by: KVNG HONEYCUTT  Authorized by: KVNG HONEYCUTT   Comparison: not compared with previous ECG   Previous ECG: no previous ECG available  Rhythm: sinus rhythm  Rate: normal  BPM: 68  Conduction: conduction normal  ST Segments: ST segments normal  T Waves: T waves normal  QRS axis: normal  Clinical impression: normal ECG          September 2017 lab results reviewed.    Assessment/Plan   Diagnoses and all orders for this visit:    Healthcare maintenance  -     Varicella-Zoster Vaccine Subcutaneous  - Vaccine counseling and current VIS is provided for immunizations administered today.  -     Ambulatory Referral For Screening Colonoscopy  - Labs current    Essential hypertension  -     ECG 12 Lead  -     Basic Metabolic Panel  -     hydrochlorothiazide (MICROZIDE) 12.5 MG capsule; Take 1 capsule by mouth Daily.  - Blood pressure well controlled today  - Healthy heart diet  - Daily aerobic exercise  - Routine blood pressure monitoring  - [place] Heart Disease and Stroke Prevention Task Force pamphlet reviewed and administered.    The patient is here for a health maintenance visit.  Currently, the patient consumes a healthy diet and has an adequate exercise regimen. Screening lab work is ordered.  Immunizations are updated today.  Advice and education is given regarding nutrition, aerobic exercise, routine dental evaluations, routine eye exams, reproductive health, cardiovascular risk reduction, sunscreen use, self skin examination (annual dermatology evaluations) and seat belt use (general overall safety).  Further recommendations  after lab evaluation.  Annual wellness evaluations recommended.

## 2017-10-19 ENCOUNTER — LAB (OUTPATIENT)
Dept: LAB | Facility: HOSPITAL | Age: 62
End: 2017-10-19

## 2017-10-19 ENCOUNTER — CLINICAL SUPPORT (OUTPATIENT)
Dept: GENETICS | Facility: HOSPITAL | Age: 62
End: 2017-10-19

## 2017-10-19 DIAGNOSIS — Z80.3 FAMILY HISTORY OF BREAST CANCER: ICD-10-CM

## 2017-10-19 DIAGNOSIS — Z13.79 GENETIC TESTING: Primary | ICD-10-CM

## 2017-10-19 DIAGNOSIS — Z80.41 FAMILY HISTORY OF OVARIAN CANCER: Primary | ICD-10-CM

## 2017-10-20 NOTE — PROGRESS NOTES
Linn Bean is a 62-year old female who was seen for genetic counseling due to a family history of breast cancer and ovarian cancer.  Ms. Bean does not have a personal history of cancer.  Ms. Bean had a total hysterectomy and oophorectomy at age 45.  She was 16 years old at menarche and 33 years old when she had her first child.  Ms. Bean was interested in discussing her risk for a hereditary cancer syndrome, and decided to pursue genetic testing.   Ms. Bean opted to pursue comprehensive testing via the [employer]va[employer] panel ordered through [facility] [employer].   This panel evaluates for mutations in 24 genes related with breast and/or ovarian cancer (CLARA, BARD1, BRCA1, BRCA2, BRIP1, CDH1, CHEK2, EPCAM, MLH1, MRE11A, MSH2, MSH6, MUTYH, NBN, NF1, PALB2, PMS2, PTEN, RAD50, RAD51C, RAD51D, SMARCA4, STK11, and TP53). Results are expected in 2-3 weeks.    PERTINENT FAMILY HISTORY: (See pedigree)       RISK ASSESSMENT:  Ms. Bean’s family history of breast and ovarian cancer raises the question of a hereditary cancer syndrome. We discussed BRCA1/2 testing as well as the option of pursuing a panel that would test for other genes known to impact breast and ovarian cancer risk in addition to BRCA1/2.  Ms. Bean meets NCCN guidelines criteria for BRCA1/2 testing based on having a first degree relative with an ovarian cancer. This risk assessment is based on the family history information provided at the time of the appointment.  The assessment could change in the future should new information be obtained.    If testing comes back negative, Ms. Bean’s breast cancer risk should be estimated based on her family history.  Based on her family history and computer modeling (Susie-Donal), Ms. Bean has a lifetime risk for breast cancer of up to 13% which is elevated over the average 60-year-old woman’s risk for breast cancer of 7.3%.  A risk greater than 20% is considered increased risk and warrants consideration of increased  screening, and Ms. Bean does not fall into that category.  This risk assessment is based on the family history information provided at the time of the appointment.  The assessment could change in the future should new information be obtained, specifically if a hereditary cancer syndrome is identified in another family member.    GENETIC COUNSELING (40 minutes): We reviewed the family history information in detail.  Cases of breast cancer follow three general patterns: sporadic, familial, and hereditary.  While most cancer is sporadic, some cases appear to occur in family clusters.  These cases are said to be familial and account for 10-20% of breast cancer   cases.  Familial cases may be due to a combination of shared genes and environmental factors among family members.  In even fewer families, the cancer is said to be inherited, and the genes responsible for the cancer are known.      Family histories typical of hereditary cancer syndromes usually include multiple first- and second-degree relatives diagnosed with cancer types that define a syndrome.  These cases tend to be diagnosed at younger-than-expected ages and can be bilateral or multifocal.  The cancer in these families follows an autosomal dominant inheritance pattern, which indicates the likely presence of a mutation in a cancer susceptibility gene.  Children and siblings of an individual believed to carry this mutation have a 50% chance of inheriting that mutation, thereby inheriting the increased risk to develop cancer.  These mutations can be passed down from the maternal or the paternal lineage.    Hereditary breast cancer accounts for 5-10% of all cases of breast cancer.  A significant proportion of hereditary breast cancer can be attributed to mutations in the BRCA1 and BRCA2 genes.  Mutations in these genes confer an increased risk for breast cancer, ovarian cancer, male breast cancer, prostate cancer and pancreatic cancer.  Women with a BRCA1 or  BRCA2 mutation who have already been diagnosed with breast cancer have a 40-60% lifetime risk of a second breast cancer.     There are other genes that are known to be associated with an increased risk for breast cancer.  Some of these genes have well defined cancer risks and established management guidelines.  Other genes that can be tested for have been more recently described, and there may be less data regarding the risks and therefore may not have established  management guidelines.  Based on Ms. Bean’s desire to get as much information as possible regarding her personal risks and potential risks for her family, she opted to pursue testing through a panel that would look at several other genes known to increase the risk for breast cancer and also ovarian cancer.    Genetic Testing:  The risks, benefits and limitations of genetic testing and implications for clinical management following testing were reviewed.  DNA test results can influence decisions regarding screening, prevention and surgical management.  Genetic testing can have significant psychological implications for both individuals and families.  Also discussed was the possibility of employment and insurance discrimination based on genetic test results and the laws in place to prevent this (ERENDIRA), as well as the limitations of these laws.    We discussed panel testing, which would involve testing for BRCA1/2 as well as several other genes at the same time (other genes include CLARA, BARD1, BRCA1, BRCA2, BRIP1, CDH1, CHEK2, EPCAM, MLH1, MRE11A, MSH2, MSH6, MUTYH, NBN, NF1, PALB2, PMS2, PTEN, RAD50, RAD51C, RAD51D, SMARCA4, STK11, and TP53). The benefits and limitations of genetic testing were discussed and Ms. Bean decided to pursue testing via the panel.  We also discussed the limitations in testing an unaffected individual.  In Ms. Bean’s case, we discussed that it would be informative for her mother to have testing at some point, however Ms.  Vikas was still offered testing due to meeting NCCN guidelines criteria for testing based on her paternal history.  The implications of a positive or negative test result were discussed. We discussed the possibility that, in some cases, genetic test results may be informative or may be ambiguous due to the identification of a genetic variant. These variants may or may not be associated with an increased cancer risk.  Given her family history, a negative test result does not eliminate all breast cancer risk to Ms. Bean or her relatives, although the risk would not be as high as it would with positive genetic testing.        PLAN: Genetic testing should be complete in 2-3 weeks, and Ms. Bean will be contacted by telephone to discuss the results.   In the meantime Ms. Bean is welcome to contact us at [phone] with any questions she may have.    Jyothi Lora MS, [facility] [place]       Certified Genetic Counselor

## 2017-11-06 ENCOUNTER — DOCUMENTATION (OUTPATIENT)
Dept: GENETICS | Facility: HOSPITAL | Age: 62
End: 2017-11-06

## 2017-12-19 PROBLEM — Z86.018 HISTORY OF UTERINE FIBROID: Status: ACTIVE | Noted: 2017-12-19

## 2017-12-20 ENCOUNTER — OFFICE VISIT (OUTPATIENT)
Dept: GYNECOLOGIC ONCOLOGY | Facility: CLINIC | Age: 62
End: 2017-12-20

## 2017-12-20 VITALS
DIASTOLIC BLOOD PRESSURE: 74 MMHG | RESPIRATION RATE: 14 BRPM | SYSTOLIC BLOOD PRESSURE: 133 MMHG | HEART RATE: 76 BPM | TEMPERATURE: 98.7 F | OXYGEN SATURATION: 96 % | BODY MASS INDEX: 26.75 KG/M2 | HEIGHT: 63 IN | WEIGHT: 151 LBS

## 2017-12-20 DIAGNOSIS — Z86.018 HISTORY OF UTERINE FIBROID: ICD-10-CM

## 2017-12-20 DIAGNOSIS — Z80.41 FAMILY HISTORY OF OVARIAN CANCER: ICD-10-CM

## 2017-12-20 DIAGNOSIS — Z01.419 WELL WOMAN EXAM WITH ROUTINE GYNECOLOGICAL EXAM: Primary | ICD-10-CM

## 2017-12-20 PROCEDURE — 99396 PREV VISIT EST AGE 40-64: CPT | Performed by: NURSE PRACTITIONER

## 2017-12-20 NOTE — PROGRESS NOTES
GYN ONCOLOGY ANNUAL WELL WOMAN VISIT      Linn Bean  [id number]  1955      Chief Complaint: No chief complaint on file.        History of present illness:  Linn Bean is a 62 y.o. year old female who is here today for an annual exam. The patient has a history of fibroid uterus as well as a family history of ovarian cancer in her mother. Her sister was also recently diagnosed with breast cancer. She is completing radiation therapy here at [facility]. The patient elected to have genetic evaluation that was negative for underlying genetic mutation. She is feeling well today and denies significant changes in health history.  Her bowels and bladder are working well. She has had no vaginal bleeding or pelvic pain. Her mammogram is UTD and she has no new breast complaints today. She called to check on her colonoscopy, and was told she is due  for repeat exam. She had bone density completed 2016 for baseline that was normal.  She is using her Vivelle with good control of her vasomotor symptoms. She is wondering if it is safe to continue HRT in terms of breast cancer rist.     Cancer History:    No history exists.       Obstetric History:  OB History      Para Term  AB Living    2 2        SAB TAB Ectopic Multiple Live Births                 Menstrual History:     No LMP recorded. Patient has had a hysterectomy.          Past Medical History:   Diagnosis Date   • DDD (degenerative disc disease), cervical    • LUCILLE (generalized anxiety disorder)    • HLD (hyperlipidemia)    • HTN (hypertension)    • Hypothyroidism    • Osteoarthritis of hands, bilateral        Past Surgical History:   Procedure Laterality Date   • BREAST LUMPECTOMY Left    • BUNIONECTOMY     • COLONOSCOPY     • EYE PTOSIS REPAIR Bilateral 2017   • HYSTERECTOMY     • KNEE ARTHROSCOPY Left    • REPLACEMENT TOTAL KNEE Left    • TONSILLECTOMY     • TUBAL ABDOMINAL LIGATION          MEDICATIONS: The current medication list was reviewed and reconciled.     Allergies:  has No Known Allergies.    Family History   Problem Relation Age of Onset   • Asthma Mother    • Ovarian cancer Mother    • Hypothyroidism Mother    • Heart failure Father    • Prostate cancer Father    • Heart attack Father    • Breast cancer Sister        Health Maintenance:  Last mammogram was 6/20/2017. Last colonoscopy was 2015, with recommended follow-up in 5 year(s). Last pap smear was 12/21/2016, results were  normal PAP.    Review of Systems   Constitutional: Negative for fatigue, fever and unexpected weight change.   HENT: Negative for congestion, ear pain, hearing loss, sinus pressure and trouble swallowing.    Eyes: Negative for visual disturbance.   Respiratory: Negative for cough, chest tightness, shortness of breath and wheezing.    Cardiovascular: Negative for chest pain, palpitations and leg swelling.   Gastrointestinal: Negative for abdominal distention, abdominal pain, constipation, diarrhea, nausea and vomiting.   Endocrine: Negative for cold intolerance, heat intolerance, polydipsia, polyphagia and polyuria.   Genitourinary: Negative for difficulty urinating, dyspareunia, dysuria, frequency, hematuria, pelvic pain, urgency, vaginal bleeding, vaginal discharge and vaginal pain.   Musculoskeletal: Negative for arthralgias, gait problem, joint swelling and myalgias.   Skin: Negative for color change, pallor and rash.   Neurological: Negative for dizziness, seizures, syncope, weakness, light-headedness, numbness and headaches.   Hematological: Negative for adenopathy. Does not bruise/bleed easily.   Psychiatric/Behavioral: Negative for agitation, confusion, sleep disturbance and suicidal ideas. The patient is not nervous/anxious.        Physical Exam  General Appearance:  alert, cooperative, no apparent distress and appears stated age   Neurologic/Psychiatric: A&O x 3, gait steady, appropriate affect   HEENT:   Normocephalic, without obvious abnormality, mucous membranes moist   Neck: Supple, symmetrical, trachea midline, no adenopathy;  No thyromegaly, masses, or tenderness   Back:   Symmetric, no curvature, ROM normal, no CVA tenderness   Lungs:   Clear to auscultation bilaterally; respirations regular, even, and unlabored bilaterally   Heart:  Regular rate and rhythm, no murmurs appreciated   Breasts:  Symmetrical, no masses, no lesions and no nipple discharge   Abdomen:   Soft, non-tender, non-distended and no organomegaly   Lymph nodes: No cervical, supraclavicular, inguinal or axillary adenopathy noted   Extremities: Normal, atraumatic; no clubbing, cyanosis, or edema    Skin: No rashes, ulcers, or suspicious lesions noted   Pelvic: External Genitalia  without lesions or skin changes  Vagina  is pink, moist, without lesions.   Vaginal Cuff  Female Vaginal Cuff: smooth, intact, without visible lesions and pap obtained  Uterus  surgically absent  Ovaries  surgically absent bilaterallly  Parametria  smooth  Rectovaginal  Female rectovaginal: confirms no masses or bleeding and Hemoccult negative     ECOG Performance Status: 0 - Asymptomatic    Procedure Note:  No notes on file    Assessment and Plan:    Diagnoses and all orders for this visit:    Well woman exam with routine gynecological exam    History of uterine fibroid    Family history of ovarian cancer      The patient was instructed to call the office in 1 week for results of her pap smear.   The patient was instructed to call with vaginal bleeding, discharge, pelvic pain, change in bowel or bladder function, or any new symptoms for evaluation of her complaints.   Mamm done 6/2016  We will slowly begin to decrease her Vivelle dose from 0.1 to 0.075 mg patches.  If she tolerates this dose, we will continue to taper next year to 0.05 mg with the goal of using the lowest dose possible to control her postmenopausal symptoms.  Colonoscopy due 2020.  Genetic testing  negative for underlying mutation.   Bone density done 12/2016 for baseline study.   The patient will continue annual GYN care with DAYAMI [facility] in [facility].   Return in about 1 year (around 12/20/2018) for Annual Exam.      DAYAMI Branham      Note: Speech recognition transcription software was used to dictate portions of this document.  An attempt at proofreading has been made though minor errors in transcription may still be present.  Please do not hesitate to call our office with any questions.

## 2017-12-28 ENCOUNTER — TELEPHONE (OUTPATIENT)
Dept: OBSTETRICS AND GYNECOLOGY | Facility: CLINIC | Age: 62
End: 2017-12-28

## 2018-07-24 RX ORDER — ESTRADIOL 0.1 MG/D
FILM, EXTENDED RELEASE TRANSDERMAL
Refills: 4 | OUTPATIENT
Start: 2018-07-24

## 2018-07-24 RX ORDER — ESTRADIOL 0.1 MG/D
1 FILM, EXTENDED RELEASE TRANSDERMAL 2 TIMES WEEKLY
Qty: 24 PATCH | Refills: 4 | Status: SHIPPED | OUTPATIENT
Start: 2018-07-26 | End: 2018-09-24

## 2018-09-24 ENCOUNTER — OFFICE VISIT (OUTPATIENT)
Dept: FAMILY MEDICINE CLINIC | Facility: CLINIC | Age: 63
End: 2018-09-24

## 2018-09-24 VITALS
BODY MASS INDEX: 27 KG/M2 | WEIGHT: 152.4 LBS | SYSTOLIC BLOOD PRESSURE: 128 MMHG | OXYGEN SATURATION: 99 % | HEIGHT: 63 IN | HEART RATE: 92 BPM | DIASTOLIC BLOOD PRESSURE: 74 MMHG | RESPIRATION RATE: 18 BRPM

## 2018-09-24 DIAGNOSIS — F41.1 GAD (GENERALIZED ANXIETY DISORDER): ICD-10-CM

## 2018-09-24 DIAGNOSIS — E03.9 ACQUIRED HYPOTHYROIDISM: ICD-10-CM

## 2018-09-24 DIAGNOSIS — Z00.00 HEALTHCARE MAINTENANCE: Primary | ICD-10-CM

## 2018-09-24 DIAGNOSIS — I10 ESSENTIAL HYPERTENSION: ICD-10-CM

## 2018-09-24 DIAGNOSIS — K21.9 GASTROESOPHAGEAL REFLUX DISEASE WITHOUT ESOPHAGITIS: ICD-10-CM

## 2018-09-24 DIAGNOSIS — E78.01 FAMILIAL HYPERCHOLESTEROLEMIA: ICD-10-CM

## 2018-09-24 PROBLEM — Z86.018 HISTORY OF UTERINE FIBROID: Status: RESOLVED | Noted: 2017-12-19 | Resolved: 2018-09-24

## 2018-09-24 LAB
25(OH)D3 SERPL-MCNC: 27 NG/ML
ALBUMIN SERPL-MCNC: 4.77 G/DL (ref 3.2–4.8)
ALBUMIN/GLOB SERPL: 2.3 G/DL (ref 1.5–2.5)
ALP SERPL-CCNC: 192 U/L (ref 25–100)
ALT SERPL W P-5'-P-CCNC: 54 U/L (ref 7–40)
ANION GAP SERPL CALCULATED.3IONS-SCNC: 12 MMOL/L (ref 3–11)
ARTICHOKE IGE QN: 154 MG/DL (ref 0–130)
AST SERPL-CCNC: 27 U/L (ref 0–33)
BASOPHILS # BLD AUTO: 0.17 10*3/MM3 (ref 0–0.2)
BASOPHILS NFR BLD AUTO: 1.6 % (ref 0–1)
BILIRUB SERPL-MCNC: 0.4 MG/DL (ref 0.3–1.2)
BUN BLD-MCNC: 16 MG/DL (ref 9–23)
BUN/CREAT SERPL: 21.9 (ref 7–25)
CALCIUM SPEC-SCNC: 10 MG/DL (ref 8.7–10.4)
CHLORIDE SERPL-SCNC: 104 MMOL/L (ref 99–109)
CHOLEST SERPL-MCNC: 220 MG/DL (ref 0–200)
CO2 SERPL-SCNC: 28 MMOL/L (ref 20–31)
CREAT BLD-MCNC: 0.73 MG/DL (ref 0.6–1.3)
DEPRECATED RDW RBC AUTO: 41.6 FL (ref 37–54)
EOSINOPHIL # BLD AUTO: 1.24 10*3/MM3 (ref 0–0.3)
EOSINOPHIL NFR BLD AUTO: 11.9 % (ref 0–3)
ERYTHROCYTE [DISTWIDTH] IN BLOOD BY AUTOMATED COUNT: 12 % (ref 11.3–14.5)
GFR SERPL CREATININE-BSD FRML MDRD: 81 ML/MIN/1.73
GLOBULIN UR ELPH-MCNC: 2 GM/DL
GLUCOSE BLD-MCNC: 105 MG/DL (ref 70–100)
HBA1C MFR BLD: 5.9 % (ref 4.8–5.6)
HCT VFR BLD AUTO: 41.7 % (ref 34.5–44)
HDLC SERPL-MCNC: 56 MG/DL (ref 40–60)
HGB BLD-MCNC: 13.5 G/DL (ref 11.5–15.5)
HIV1+2 AB SER QL: NORMAL
IMM GRANULOCYTES # BLD: 0.1 10*3/MM3 (ref 0–0.03)
IMM GRANULOCYTES NFR BLD: 1 % (ref 0–0.6)
LYMPHOCYTES # BLD AUTO: 2.04 10*3/MM3 (ref 0.6–4.8)
LYMPHOCYTES NFR BLD AUTO: 19.6 % (ref 24–44)
MCH RBC QN AUTO: 30.9 PG (ref 27–31)
MCHC RBC AUTO-ENTMCNC: 32.4 G/DL (ref 32–36)
MCV RBC AUTO: 95.4 FL (ref 80–99)
MONOCYTES # BLD AUTO: 0.94 10*3/MM3 (ref 0–1)
MONOCYTES NFR BLD AUTO: 9 % (ref 0–12)
NEUTROPHILS # BLD AUTO: 6 10*3/MM3 (ref 1.5–8.3)
NEUTROPHILS NFR BLD AUTO: 57.9 % (ref 41–71)
PLATELET # BLD AUTO: 426 10*3/MM3 (ref 150–450)
PMV BLD AUTO: 10.8 FL (ref 6–12)
POTASSIUM BLD-SCNC: 4.8 MMOL/L (ref 3.5–5.5)
PROT SERPL-MCNC: 6.8 G/DL (ref 5.7–8.2)
RBC # BLD AUTO: 4.37 10*6/MM3 (ref 3.89–5.14)
SODIUM BLD-SCNC: 144 MMOL/L (ref 132–146)
T4 FREE SERPL-MCNC: 1.02 NG/DL (ref 0.89–1.76)
TRIGL SERPL-MCNC: 257 MG/DL (ref 0–150)
TSH SERPL DL<=0.05 MIU/L-ACNC: 16.98 MIU/ML (ref 0.35–5.35)
URATE SERPL-MCNC: 3.7 MG/DL (ref 3.1–7.8)
WBC NRBC COR # BLD: 10.39 10*3/MM3 (ref 3.5–10.8)

## 2018-09-24 PROCEDURE — 90632 HEPA VACCINE ADULT IM: CPT | Performed by: FAMILY MEDICINE

## 2018-09-24 PROCEDURE — 90471 IMMUNIZATION ADMIN: CPT | Performed by: FAMILY MEDICINE

## 2018-09-24 PROCEDURE — 84439 ASSAY OF FREE THYROXINE: CPT | Performed by: FAMILY MEDICINE

## 2018-09-24 PROCEDURE — 80061 LIPID PANEL: CPT | Performed by: FAMILY MEDICINE

## 2018-09-24 PROCEDURE — 99396 PREV VISIT EST AGE 40-64: CPT | Performed by: FAMILY MEDICINE

## 2018-09-24 PROCEDURE — 84443 ASSAY THYROID STIM HORMONE: CPT | Performed by: FAMILY MEDICINE

## 2018-09-24 PROCEDURE — 90472 IMMUNIZATION ADMIN EACH ADD: CPT | Performed by: FAMILY MEDICINE

## 2018-09-24 PROCEDURE — 85025 COMPLETE CBC W/AUTO DIFF WBC: CPT | Performed by: FAMILY MEDICINE

## 2018-09-24 PROCEDURE — 82306 VITAMIN D 25 HYDROXY: CPT | Performed by: FAMILY MEDICINE

## 2018-09-24 PROCEDURE — 36415 COLL VENOUS BLD VENIPUNCTURE: CPT | Performed by: FAMILY MEDICINE

## 2018-09-24 PROCEDURE — 90732 PPSV23 VACC 2 YRS+ SUBQ/IM: CPT | Performed by: FAMILY MEDICINE

## 2018-09-24 PROCEDURE — 83036 HEMOGLOBIN GLYCOSYLATED A1C: CPT | Performed by: FAMILY MEDICINE

## 2018-09-24 PROCEDURE — 80053 COMPREHEN METABOLIC PANEL: CPT | Performed by: FAMILY MEDICINE

## 2018-09-24 PROCEDURE — 84550 ASSAY OF BLOOD/URIC ACID: CPT | Performed by: FAMILY MEDICINE

## 2018-09-24 PROCEDURE — G0432 EIA HIV-1/HIV-2 SCREEN: HCPCS | Performed by: FAMILY MEDICINE

## 2018-09-24 RX ORDER — ATORVASTATIN CALCIUM 20 MG/1
20 TABLET, FILM COATED ORAL DAILY
Qty: 90 TABLET | Refills: 3 | Status: SHIPPED | OUTPATIENT
Start: 2018-09-24 | End: 2019-09-29 | Stop reason: SDUPTHER

## 2018-09-24 RX ORDER — FAMOTIDINE 40 MG/1
TABLET, FILM COATED ORAL
COMMUNITY
Start: 2018-09-09 | End: 2018-09-24 | Stop reason: SDUPTHER

## 2018-09-24 RX ORDER — FAMOTIDINE 40 MG/1
40 TABLET, FILM COATED ORAL NIGHTLY PRN
Qty: 90 TABLET | Refills: 1 | Status: SHIPPED | OUTPATIENT
Start: 2018-09-24 | End: 2019-07-13 | Stop reason: SDUPTHER

## 2018-09-24 RX ORDER — ANASTROZOLE 1 MG/1
TABLET ORAL
COMMUNITY
Start: 2018-09-05 | End: 2019-11-18

## 2018-09-24 RX ORDER — HYDROCHLOROTHIAZIDE 12.5 MG/1
12.5 CAPSULE, GELATIN COATED ORAL DAILY
Qty: 90 CAPSULE | Refills: 3 | Status: SHIPPED | OUTPATIENT
Start: 2018-09-24 | End: 2019-09-29 | Stop reason: SDUPTHER

## 2018-09-24 RX ORDER — LEVOTHYROXINE SODIUM 112 UG/1
112 TABLET ORAL DAILY
Qty: 90 TABLET | Refills: 3 | Status: SHIPPED | OUTPATIENT
Start: 2018-09-24 | End: 2019-11-18

## 2018-09-24 RX ORDER — FLUOXETINE 10 MG/1
10 CAPSULE ORAL DAILY
Qty: 90 CAPSULE | Refills: 3 | Status: SHIPPED | OUTPATIENT
Start: 2018-09-24 | End: 2019-11-18

## 2018-09-24 NOTE — PROGRESS NOTES
Subjective   Linn Bean is a 63 y.o. female.     History of Present Illness   She is here for her annual fasting wellness evaluation.  She is amendable to updating her immunizations.  She describes ongoing care with her gynecologist, oncologist, radiation oncologist and thoracic surgeon.  She is tolerating her medications well with no adverse events.  She is needing medication refills today.  She describes good blood pressure control, good reflux control and a healthy heart diet.  She describes her stress in stable in light of her recent medical diagnosis.    Review of Systems   Constitutional: Positive for fatigue.   HENT: Negative.    Eyes: Negative.    Respiratory: Negative.  Negative for shortness of breath.    Cardiovascular: Negative.    Gastrointestinal: Negative.    Endocrine: Negative.    Genitourinary: Negative.    Musculoskeletal: Negative.    Skin: Negative.    Allergic/Immunologic: Negative.    Neurological: Negative.    Hematological: Negative.    Psychiatric/Behavioral: Negative.        Objective   Physical Exam   Constitutional: She is oriented to person, place, and time. She appears well-developed and well-nourished. She is cooperative.   HENT:   Head: Normocephalic and atraumatic.   Right Ear: Hearing and external ear normal.   Left Ear: Hearing and external ear normal.   Nose: Nose normal.   Mouth/Throat: Uvula is midline, oropharynx is clear and moist and mucous membranes are normal.   Eyes: Pupils are equal, round, and reactive to light. Conjunctivae and EOM are normal. No scleral icterus.   Neck: Trachea normal and normal range of motion. Neck supple. No JVD present. Carotid bruit is not present. No thyromegaly present.   Cardiovascular: Normal rate, regular rhythm, normal heart sounds and intact distal pulses.    Pulmonary/Chest: Effort normal and breath sounds normal.   Left thoracotomy scar healing well.   Abdominal: Soft. Bowel sounds are normal. There is no hepatosplenomegaly. There is  no tenderness.   Musculoskeletal: Normal range of motion.   Lymphadenopathy:     She has no cervical adenopathy.   Neurological: She is alert and oriented to person, place, and time. She has normal strength and normal reflexes. No sensory deficit. Gait normal.   Skin: Skin is warm and dry.   Psychiatric: She has a normal mood and affect. Her speech is normal and behavior is normal. Judgment and thought content normal. Cognition and memory are normal.   Nursing note and vitals reviewed.      Assessment/Plan   Diagnoses and all orders for this visit:    Healthcare maintenance  -     POC Urinalysis Dipstick, Automated  -     Comprehensive Metabolic Panel  -     CBC & Differential  -     Lipid Panel  -     TSH  -     T4, Free  -     Uric Acid  -     HIV-1 / O / 2 Ag / Antibody 4th Generation  -     Hemoglobin A1c  -     Vitamin D 25 Hydroxy  -     Hepatitis A Vaccine Adult IM  -     Pneumococcal Polysaccharide Vaccine 23-Valent  IM  - Vaccine counseling and current VIS is provided for immunizations administered today.    Essential hypertension  -     hydrochlorothiazide (MICROZIDE) 12.5 MG capsule; Take 1 capsule by mouth Daily.  - Healthy heart diet  - Daily aerobic exercise  - Routine blood pressure monitoring  - [place] Heart Disease and Stroke Prevention Task Force pamphlet reviewed and administered.    Acquired hypothyroidism  -     Comprehensive Metabolic Panel  -     TSH  -     T4, Free  -     levothyroxine (SYNTHROID, LEVOTHROID) 112 MCG tablet; Take 1 tablet by mouth Daily.    Familial hypercholesterolemia  -     Comprehensive Metabolic Panel  -     Lipid Panel  -     atorvastatin (LIPITOR) 20 MG tablet; Take 1 tablet by mouth Daily.  - Low cholesterol diet (< 200 mg / day)  - Daily aerobic exercise  - Aspirin 81 mg po once daily    LUCILLE (generalized anxiety disorder)  -     FLUoxetine (PROzac) 10 MG capsule; Take 1 capsule by mouth Daily.  - Routine counseling recommended  - Sleep/wake hygiene  - Daily aerobic  exercise    Gastroesophageal reflux disease without esophagitis  -     CBC & Differential  -     famotidine (PEPCID) 40 MG tablet; Take 1 tablet by mouth At Night As Needed for Heartburn.  - HOB elevated  - Avoid late night meals  - Avoid excessive caffeine   - Avoid excessive carbonated beverages  - Report any dysphagia or odynophagia    The patient is here for a health maintenance visit.  Currently, the patient consumes a healthy diet and has an adequate exercise regimen. Screening lab work is ordered.  Immunizations are updated today.  Advice and education is given regarding nutrition, aerobic exercise, routine dental evaluations, routine eye exams, reproductive health, cardiovascular risk reduction, sunscreen use, self skin examination (annual dermatology evaluations) and seat belt use (general overall safety).  Further recommendations after lab evaluation.  Annual wellness evaluations recommended. '

## 2018-10-10 RX ORDER — FLUOXETINE 10 MG/1
CAPSULE ORAL
Qty: 90 CAPSULE | Refills: 4 | OUTPATIENT
Start: 2018-10-10

## 2018-10-25 ENCOUNTER — OFFICE VISIT (OUTPATIENT)
Dept: FAMILY MEDICINE CLINIC | Facility: CLINIC | Age: 63
End: 2018-10-25

## 2018-10-25 VITALS
HEIGHT: 63 IN | BODY MASS INDEX: 26.36 KG/M2 | OXYGEN SATURATION: 98 % | DIASTOLIC BLOOD PRESSURE: 80 MMHG | WEIGHT: 148.8 LBS | TEMPERATURE: 98 F | SYSTOLIC BLOOD PRESSURE: 120 MMHG | HEART RATE: 77 BPM | RESPIRATION RATE: 20 BRPM

## 2018-10-25 DIAGNOSIS — E03.9 ACQUIRED HYPOTHYROIDISM: Primary | ICD-10-CM

## 2018-10-25 DIAGNOSIS — J01.80 OTHER ACUTE SINUSITIS, RECURRENCE NOT SPECIFIED: ICD-10-CM

## 2018-10-25 LAB
T4 FREE SERPL-MCNC: 1.34 NG/DL (ref 0.89–1.76)
TSH SERPL DL<=0.05 MIU/L-ACNC: 4.8 MIU/ML (ref 0.35–5.35)

## 2018-10-25 PROCEDURE — 84443 ASSAY THYROID STIM HORMONE: CPT | Performed by: NURSE PRACTITIONER

## 2018-10-25 PROCEDURE — 36415 COLL VENOUS BLD VENIPUNCTURE: CPT | Performed by: NURSE PRACTITIONER

## 2018-10-25 PROCEDURE — 99214 OFFICE O/P EST MOD 30 MIN: CPT | Performed by: NURSE PRACTITIONER

## 2018-10-25 PROCEDURE — 84439 ASSAY OF FREE THYROXINE: CPT | Performed by: NURSE PRACTITIONER

## 2018-10-25 RX ORDER — FLUTICASONE PROPIONATE 50 MCG
2 SPRAY, SUSPENSION (ML) NASAL DAILY
Qty: 9.9 ML | Refills: 11 | Status: SHIPPED | OUTPATIENT
Start: 2018-10-25 | End: 2018-11-24

## 2018-10-25 RX ORDER — AMOXICILLIN AND CLAVULANATE POTASSIUM 875; 125 MG/1; MG/1
1 TABLET, FILM COATED ORAL EVERY 12 HOURS SCHEDULED
Qty: 20 TABLET | Refills: 0 | Status: SHIPPED | OUTPATIENT
Start: 2018-10-25 | End: 2018-11-13

## 2018-10-25 RX ORDER — DEXTROMETHORPHAN HYDROBROMIDE AND PROMETHAZINE HYDROCHLORIDE 15; 6.25 MG/5ML; MG/5ML
5 SYRUP ORAL 4 TIMES DAILY PRN
Qty: 240 ML | Refills: 0 | Status: SHIPPED | OUTPATIENT
Start: 2018-10-25 | End: 2019-01-11

## 2018-10-25 NOTE — PROGRESS NOTES
Subjective   Linn Bean is a 63 y.o. female.     History of Present Illness One week history of nasal congestion, post nasal drainage, sneezing, clear sputum. She has a cough and slight sob. No fever or wheezing. Recent breast and lung ca diagnosis. Status post lumpectomy and lobectomy. On arimidex. Starting radiation soon.    Also requests repeat labs for hypothyroidism. Last lab TSH 16 with no adjustment in medication.    The following portions of the patient's history were reviewed and updated as appropriate: allergies, current medications, past family history, past medical history, past social history, past surgical history and problem list.    Review of Systems   Constitutional: Negative for appetite change, fever, unexpected weight gain and unexpected weight loss.   HENT: Positive for congestion, rhinorrhea, sinus pressure, sneezing and sore throat. Negative for nosebleeds and trouble swallowing.    Eyes: Negative for visual disturbance.   Respiratory: Positive for cough and shortness of breath. Negative for wheezing.    Cardiovascular: Negative for chest pain, palpitations and leg swelling.   Gastrointestinal: Negative for abdominal pain, blood in stool, constipation, diarrhea, nausea and vomiting.   Endocrine: Negative for polydipsia, polyphagia and polyuria.   Genitourinary: Negative for dysuria, frequency and hematuria.   Musculoskeletal: Negative for arthralgias, joint swelling and myalgias.   Skin: Negative for rash.   Neurological: Negative for dizziness, seizures, syncope and numbness.   Hematological: Negative for adenopathy. Does not bruise/bleed easily.   Psychiatric/Behavioral: Negative for behavioral problems, sleep disturbance and depressed mood. The patient is not nervous/anxious.        Objective   Physical Exam   Constitutional: She is oriented to person, place, and time. She appears well-developed and well-nourished. No distress.   HENT:   Head: Normocephalic and atraumatic.   Right Ear:  Tympanic membrane and external ear normal.   Left Ear: Tympanic membrane and external ear normal.   Nose: Nose normal.   Mouth/Throat: Oropharynx is clear and moist. No oropharyngeal exudate.   Eyes: Pupils are equal, round, and reactive to light. Conjunctivae are normal. Right eye exhibits no discharge. Left eye exhibits no discharge. No scleral icterus.   Neck: Neck supple. No tracheal deviation present. No thyromegaly present.   Cardiovascular: Normal rate, regular rhythm and normal heart sounds.  Exam reveals no gallop and no friction rub.    No murmur heard.  Pulmonary/Chest: Effort normal and breath sounds normal. No respiratory distress. She has no wheezes.   Abdominal: Soft. Bowel sounds are normal. She exhibits no distension and no mass. There is no tenderness.   Musculoskeletal: She exhibits no edema or deformity.   Lymphadenopathy:     She has no cervical adenopathy.   Neurological: She is alert and oriented to person, place, and time. Coordination normal.   Skin: Skin is warm and dry. Capillary refill takes less than 2 seconds. No rash noted. No erythema.   Psychiatric: She has a normal mood and affect. Her speech is normal and behavior is normal. Judgment and thought content normal.   Nursing note and vitals reviewed.        Assessment/Plan   Linn was seen today for sinusitis and cough.    Diagnoses and all orders for this visit:    Acquired hypothyroidism  -     T4, Free  -     TSH    Other acute sinusitis, recurrence not specified  -     promethazine-dextromethorphan (PROMETHAZINE-DM) 6.25-15 MG/5ML syrup; Take 5 mL by mouth 4 (Four) Times a Day As Needed for Cough.  -     amoxicillin-clavulanate (AUGMENTIN) 875-125 MG per tablet; Take 1 tablet by mouth Every 12 (Twelve) Hours.  -     fluticasone (FLONASE) 50 MCG/ACT nasal spray; 2 sprays into the nostril(s) as directed by provider Daily for 30 days. Administer 2 sprays in each nostril for each dose.      Increase fluids and use plain mucinex. Follow  up symptoms persist or worsen.    Will follow up with lab results.

## 2018-11-13 ENCOUNTER — OFFICE VISIT (OUTPATIENT)
Dept: FAMILY MEDICINE CLINIC | Facility: CLINIC | Age: 63
End: 2018-11-13

## 2018-11-13 VITALS
OXYGEN SATURATION: 97 % | HEART RATE: 100 BPM | RESPIRATION RATE: 16 BRPM | BODY MASS INDEX: 26.37 KG/M2 | SYSTOLIC BLOOD PRESSURE: 140 MMHG | DIASTOLIC BLOOD PRESSURE: 72 MMHG | HEIGHT: 63 IN | WEIGHT: 148.81 LBS | TEMPERATURE: 98.4 F

## 2018-11-13 DIAGNOSIS — J01.80 OTHER ACUTE SINUSITIS, RECURRENCE NOT SPECIFIED: Primary | ICD-10-CM

## 2018-11-13 PROCEDURE — 96372 THER/PROPH/DIAG INJ SC/IM: CPT | Performed by: NURSE PRACTITIONER

## 2018-11-13 PROCEDURE — 99213 OFFICE O/P EST LOW 20 MIN: CPT | Performed by: NURSE PRACTITIONER

## 2018-11-13 RX ORDER — CEFDINIR 300 MG/1
300 CAPSULE ORAL 2 TIMES DAILY
Qty: 20 CAPSULE | Refills: 0 | Status: SHIPPED | OUTPATIENT
Start: 2018-11-13 | End: 2019-01-11

## 2018-11-13 RX ORDER — METHYLPREDNISOLONE ACETATE 40 MG/ML
40 INJECTION, SUSPENSION INTRA-ARTICULAR; INTRALESIONAL; INTRAMUSCULAR; SOFT TISSUE ONCE
Status: COMPLETED | OUTPATIENT
Start: 2018-11-13 | End: 2018-11-13

## 2018-11-13 RX ADMIN — METHYLPREDNISOLONE ACETATE 40 MG: 40 INJECTION, SUSPENSION INTRA-ARTICULAR; INTRALESIONAL; INTRAMUSCULAR; SOFT TISSUE at 13:19

## 2018-11-13 NOTE — PROGRESS NOTES
Subjective   Linn Bean is a 63 y.o. female.     History of Present Illness Linn was seen 10/25 for sinus infection. Treated with Augmentin. Symptoms improved but she continued to have clear nasal discharge and post nasal drainage. Saw her Oncologist yesterday. They approved use of steroids. She is having radiation treatment for breast/lung ca. No fever. No chest pain, sob or wheezing.    The following portions of the patient's history were reviewed and updated as appropriate: allergies, current medications, past family history, past medical history, past social history, past surgical history and problem list.    Review of Systems   Constitutional: Negative for appetite change, fever, unexpected weight gain and unexpected weight loss.   HENT: Positive for postnasal drip, rhinorrhea and sinus pressure. Negative for congestion, nosebleeds, sore throat and trouble swallowing.    Eyes: Negative for visual disturbance.   Respiratory: Negative for cough, shortness of breath and wheezing.    Cardiovascular: Negative for chest pain, palpitations and leg swelling.   Gastrointestinal: Negative for abdominal pain, blood in stool, constipation, diarrhea, nausea and vomiting.   Endocrine: Negative for polydipsia, polyphagia and polyuria.   Genitourinary: Negative for dysuria, frequency and hematuria.   Musculoskeletal: Negative for arthralgias, joint swelling and myalgias.   Skin: Negative for rash.   Neurological: Negative for dizziness, seizures, syncope and numbness.   Hematological: Negative for adenopathy. Does not bruise/bleed easily.   Psychiatric/Behavioral: Negative for behavioral problems, sleep disturbance and depressed mood. The patient is not nervous/anxious.        Objective   Physical Exam   Constitutional: She is oriented to person, place, and time. She appears well-developed and well-nourished. No distress.   HENT:   Head: Normocephalic and atraumatic.   Right Ear: Tympanic membrane and external ear normal.    Left Ear: Tympanic membrane and external ear normal.   Nose: Nose normal.   Mouth/Throat: Oropharynx is clear and moist. No oropharyngeal exudate.   Eyes: Conjunctivae are normal. Pupils are equal, round, and reactive to light. Right eye exhibits no discharge. Left eye exhibits no discharge. No scleral icterus.   Neck: Neck supple. No tracheal deviation present. No thyromegaly present.   Cardiovascular: Normal rate, regular rhythm and normal heart sounds. Exam reveals no gallop and no friction rub.   No murmur heard.  Pulmonary/Chest: Effort normal and breath sounds normal. No respiratory distress. She has no wheezes.   Abdominal: Soft. Bowel sounds are normal. She exhibits no distension and no mass. There is no tenderness.   Musculoskeletal: She exhibits no edema or deformity.   Lymphadenopathy:     She has no cervical adenopathy.   Neurological: She is alert and oriented to person, place, and time. Coordination normal.   Skin: Skin is warm and dry. Capillary refill takes less than 2 seconds. No rash noted. No erythema.   Psychiatric: She has a normal mood and affect. Her speech is normal and behavior is normal. Judgment and thought content normal.   Nursing note and vitals reviewed.        Assessment/Plan   Linn was seen today for nasal congestion.    Diagnoses and all orders for this visit:    Other acute sinusitis, recurrence not specified  -     cefdinir (OMNICEF) 300 MG capsule; Take 1 capsule by mouth 2 (Two) Times a Day.  -     methylPREDNISolone acetate (DEPO-medrol) injection 40 mg; Inject 1 mL into the appropriate muscle as directed by prescriber 1 (One) Time.    Symptomatic treatment. Follow up as needed.

## 2019-01-11 ENCOUNTER — OFFICE VISIT (OUTPATIENT)
Dept: GYNECOLOGIC ONCOLOGY | Facility: CLINIC | Age: 64
End: 2019-01-11

## 2019-01-11 VITALS
WEIGHT: 152 LBS | HEART RATE: 91 BPM | BODY MASS INDEX: 26.93 KG/M2 | RESPIRATION RATE: 16 BRPM | DIASTOLIC BLOOD PRESSURE: 78 MMHG | OXYGEN SATURATION: 96 % | SYSTOLIC BLOOD PRESSURE: 141 MMHG | TEMPERATURE: 97.7 F

## 2019-01-11 DIAGNOSIS — C34.32 MALIGNANT NEOPLASM OF LOWER LOBE OF LEFT LUNG (HCC): ICD-10-CM

## 2019-01-11 DIAGNOSIS — Z01.419 WELL WOMAN EXAM WITH ROUTINE GYNECOLOGICAL EXAM: Primary | ICD-10-CM

## 2019-01-11 DIAGNOSIS — D05.11 BREAST NEOPLASM, TIS (DCIS), RIGHT: ICD-10-CM

## 2019-01-11 PROCEDURE — 99396 PREV VISIT EST AGE 40-64: CPT | Performed by: NURSE PRACTITIONER

## 2019-01-11 RX ORDER — VENLAFAXINE HYDROCHLORIDE 37.5 MG/1
CAPSULE, EXTENDED RELEASE ORAL
COMMUNITY
Start: 2019-01-09 | End: 2019-11-18

## 2019-01-11 RX ORDER — LEVOTHYROXINE SODIUM 125 MCG
TABLET ORAL
COMMUNITY
Start: 2018-12-10 | End: 2019-11-18 | Stop reason: SDUPTHER

## 2019-01-11 NOTE — PROGRESS NOTES
GYN ONCOLOGY ANNUAL WELL WOMAN VISIT      Linn Bean  [id number]  1955      Chief Complaint: Annual Exam (no gyn complaints)        History of present illness:  Linn Bean is a 64 y.o. year old female who is here today for an annual exam.The patient has a history of fibroid uterus as well as a family history of breast and ovarian cancers. She denies any gynecologic complaints and is feeling well today.   Unfortunately, since her last visit here, she has been diagnosed with both breast and lung cancer, separate primaries. Linn had an abnormal mammogram in , subsequent right lumpectomy in 2018 revealed a stage 1 DCIS. She had a CT scan ordered by her oncologist to evaluate lymph nodes that revealed a LLL lung nodule. She subsequently had a lobectomy in 2018 that showed a stage 1 lung cancer. She underwent breast radiation treatments in 2018. Her HRT has been stopped and she is now on Arimidex. She will be followed closely by her oncologist at [facility].   She had a visit with her onocologist yesterday and is pleased to report that her post-treatment CT is negative. She will continue CT chest every 3 months for surveillance of her lung cancer. She also will be having mammograms and alternating breast MRIs every 6 months.  She was started on Effexor yesterday for management of hot flashes.    She is due for a colonoscopy this year. Last bone density was completed in 2016 which was WNL. She is taking vitamin D and Calcium since starting Arimidex.        Obstetric History:  OB History      Para Term  AB Living    2 2            SAB TAB Ectopic Molar Multiple Live Births                        Menstrual History:     No LMP recorded. Patient has had a hysterectomy.          Past Medical History:   Diagnosis Date   • Adenocarcinoma of lung, stage 1, left (CMS/HCC) 2018    LLL   • DDD (degenerative disc disease), cervical    • Ductal carcinoma in situ (DCIS) of right  breast 2018   • LUCILLE (generalized anxiety disorder)    • HLD (hyperlipidemia)    • HTN (hypertension)    • Hypothyroidism    • Osteoarthritis of hands, bilateral        Past Surgical History:   Procedure Laterality Date   • BREAST LUMPECTOMY Left 1998   • BREAST LUMPECTOMY Right 2018   • BUNIONECTOMY     • COLONOSCOPY  2014   • EYE PTOSIS REPAIR Bilateral 2017   • HYSTERECTOMY  2000   • KNEE ARTHROSCOPY Left 2014   • LUNG LOBECTOMY Left 2018    LLL   • REPLACEMENT TOTAL KNEE Left 2015   • THORACOTOMY Left 2018   • TONSILLECTOMY  1961   • TUBAL ABDOMINAL LIGATION  1993       MEDICATIONS: The current medication list was reviewed and reconciled.     Allergies:  has No Known Allergies.    Family History   Problem Relation Age of Onset   • Asthma Mother    • Ovarian cancer Mother    • Hypothyroidism Mother    • Heart failure Father    • Prostate cancer Father    • Heart attack Father    • Breast cancer Sister        Health Maintenance:  Last mammogram was 07/2018. Last colonoscopy was 2014, with recommended follow-up in 5 year(s). Last DEXA was 2016. Last pap smear was 12/2017, results were  normal PAP.. She  does not have a history of abnormal pap smears.         Review of Systems   Constitutional: Negative for fatigue, fever and unexpected weight change.   HENT: Negative for congestion, ear pain, hearing loss, sinus pressure and trouble swallowing.    Eyes: Negative for visual disturbance.   Respiratory: Negative for cough, chest tightness, shortness of breath and wheezing.    Cardiovascular: Negative for chest pain, palpitations and leg swelling.   Gastrointestinal: Negative for abdominal distention, abdominal pain, constipation, diarrhea, nausea and vomiting.   Endocrine: Negative for cold intolerance, heat intolerance, polydipsia, polyphagia and polyuria.   Genitourinary: Negative for difficulty urinating, dyspareunia, dysuria, frequency, hematuria, pelvic pain, urgency, vaginal bleeding, vaginal discharge and  vaginal pain.   Musculoskeletal: Negative for arthralgias, gait problem, joint swelling and myalgias.   Skin: Negative for color change, pallor and rash.   Neurological: Negative for dizziness, seizures, syncope, weakness, light-headedness, numbness and headaches.   Hematological: Negative for adenopathy. Does not bruise/bleed easily.   Psychiatric/Behavioral: Negative for agitation, confusion, sleep disturbance and suicidal ideas. The patient is not nervous/anxious.        Physical Exam  Vital Signs: /78   Pulse 91   Temp 97.7 °F (36.5 °C) (Temporal)   Resp 16   Wt 68.9 kg (152 lb)   SpO2 96%   BMI 26.93 kg/m²    General Appearance:  alert, cooperative, no apparent distress and appears stated age   Neurologic/Psychiatric: A&O x 3, gait steady, appropriate affect   HEENT:  Normocephalic, without obvious abnormality, mucous membranes moist   Neck: Supple, symmetrical, trachea midline, no adenopathy;  No thyromegaly, masses, or tenderness   Back:   Symmetric, no curvature, ROM normal, no CVA tenderness   Lungs:   Clear to auscultation bilaterally; respirations regular, even, and unlabored bilaterally. Left chest scarring consistent with recent surgeries   Heart:  Regular rate and rhythm, no murmurs appreciated   Breasts:  Symmetrical, no masses, no lesions, no nipple discharge and scarring noted to upper outer quadrant of right breast, consistent with recent lupectomy and radiation   Abdomen:   Soft, non-tender, non-distended and no organomegaly   Lymph nodes: No cervical, supraclavicular, inguinal or axillary adenopathy noted   Extremities: Normal, atraumatic; no clubbing, cyanosis, or edema    Skin: No rashes, ulcers, or suspicious lesions noted   Pelvic: External Genitalia  without lesions or skin changes  Vagina  is pink, moist, without lesions.   Vaginal Cuff  Female Vaginal Cuff: smooth, intact, without visible lesions and pap obtained  Uterus  surgically absent  Ovaries  surgically absent  bilaterallly  Parametria  smooth  Rectovaginal  Female rectovaginal: confirms no masses or bleeding and Hemoccult negative     ECOG Performance Status: 0 - Asymptomatic    Procedure Note:  No notes on file    Assessment and Plan:    Linn was seen today for annual exam.    Diagnoses and all orders for this visit:    Well woman exam with routine gynecological exam    Breast neoplasm, Tis (DCIS), right    Malignant neoplasm of lower lobe of left lung (CMS/HCC)        Pap was done today. If she does not receive the results of the Pap within 2 weeks  time, she was instructed to call to find out the results.      Continue mammograms and surveillance with oncologist. Breast exam unremarkable today.     DEXA UTD. Continue Vitamin D and calcium supplementation. Repeat at age 65 or sooner if recommended by oncologist.     Colonoscopy due 2019, she will call to schedule.       Return to clinic in 1 year for Annual exam.      DAYAMI Mullins      Note: Speech recognition transcription software was used to dictate portions of this document.  An attempt at proofreading has been made though minor errors in transcription may still be present.  Please do not hesitate to call our office with any questions.

## 2019-03-21 DIAGNOSIS — C50.919 MALIGNANT NEOPLASM OF FEMALE BREAST, UNSPECIFIED ESTROGEN RECEPTOR STATUS, UNSPECIFIED LATERALITY, UNSPECIFIED SITE OF BREAST (HCC): Primary | ICD-10-CM

## 2019-07-13 DIAGNOSIS — K21.9 GASTROESOPHAGEAL REFLUX DISEASE WITHOUT ESOPHAGITIS: ICD-10-CM

## 2019-07-15 RX ORDER — FAMOTIDINE 40 MG/1
TABLET, FILM COATED ORAL
Qty: 90 TABLET | Refills: 0 | Status: SHIPPED | OUTPATIENT
Start: 2019-07-15 | End: 2019-10-18 | Stop reason: SDUPTHER

## 2019-09-18 ENCOUNTER — TELEPHONE (OUTPATIENT)
Dept: FAMILY MEDICINE CLINIC | Facility: CLINIC | Age: 64
End: 2019-09-18

## 2019-09-18 NOTE — TELEPHONE ENCOUNTER
Called pt informed her on below. She will discuss this with her pharmacy and/or with Dr Luo next week on her Annual wellness office visit.      ----- Message from Maged Luo MD sent at 9/17/2019  5:12 PM EDT -----  Regarding: RE: RX REFILL  Please have the patient call the [facility] at [facility] to identify dosing concerns and prescribing providers.  She is due for her annual fasting wellness after September 25.    ----- Message -----  From: Tone Bledsoe MA  Sent: 9/17/2019   1:22 PM  To: Maged Luo MD  Subject: FW: RX REFILL                                        ----- Message -----  From: Clarisse Castro, [facility]  Sent: 9/17/2019  12:27 PM  To: Tone Whaley MA  Subject: RX REFILL                                        PT SAID THAT HER SYNTHROID WAS SENT IN INCORRECTLY. IT WAS SUPPOSED TO  MC[facility]. CAN THIS BE CORRECTED?    [facility]

## 2019-09-29 DIAGNOSIS — E78.01 FAMILIAL HYPERCHOLESTEROLEMIA: ICD-10-CM

## 2019-09-29 DIAGNOSIS — I10 ESSENTIAL HYPERTENSION: ICD-10-CM

## 2019-09-30 RX ORDER — ATORVASTATIN CALCIUM 20 MG/1
TABLET, FILM COATED ORAL
Qty: 30 TABLET | Refills: 0 | Status: SHIPPED | OUTPATIENT
Start: 2019-09-30 | End: 2019-10-28 | Stop reason: SDUPTHER

## 2019-09-30 RX ORDER — HYDROCHLOROTHIAZIDE 12.5 MG/1
CAPSULE, GELATIN COATED ORAL
Qty: 30 CAPSULE | Refills: 0 | Status: SHIPPED | OUTPATIENT
Start: 2019-09-30 | End: 2019-10-28 | Stop reason: SDUPTHER

## 2019-10-10 DIAGNOSIS — E03.9 ACQUIRED HYPOTHYROIDISM: ICD-10-CM

## 2019-10-10 RX ORDER — LEVOTHYROXINE SODIUM 112 UG/1
TABLET ORAL
Qty: 30 TABLET | Refills: 2 | OUTPATIENT
Start: 2019-10-10

## 2019-10-18 DIAGNOSIS — K21.9 GASTROESOPHAGEAL REFLUX DISEASE WITHOUT ESOPHAGITIS: ICD-10-CM

## 2019-10-18 RX ORDER — FAMOTIDINE 40 MG/1
TABLET, FILM COATED ORAL
Qty: 30 TABLET | Refills: 0 | Status: SHIPPED | OUTPATIENT
Start: 2019-10-18 | End: 2019-11-18 | Stop reason: SDUPTHER

## 2019-10-28 DIAGNOSIS — E78.01 FAMILIAL HYPERCHOLESTEROLEMIA: ICD-10-CM

## 2019-10-28 DIAGNOSIS — I10 ESSENTIAL HYPERTENSION: ICD-10-CM

## 2019-10-29 RX ORDER — ATORVASTATIN CALCIUM 20 MG/1
TABLET, FILM COATED ORAL
Qty: 30 TABLET | Refills: 0 | Status: SHIPPED | OUTPATIENT
Start: 2019-10-29 | End: 2019-11-18 | Stop reason: SDUPTHER

## 2019-10-29 RX ORDER — HYDROCHLOROTHIAZIDE 12.5 MG/1
CAPSULE, GELATIN COATED ORAL
Qty: 30 CAPSULE | Refills: 0 | Status: SHIPPED | OUTPATIENT
Start: 2019-10-29 | End: 2019-11-18 | Stop reason: SDUPTHER

## 2019-11-18 ENCOUNTER — OFFICE VISIT (OUTPATIENT)
Dept: FAMILY MEDICINE CLINIC | Facility: CLINIC | Age: 64
End: 2019-11-18

## 2019-11-18 VITALS
OXYGEN SATURATION: 97 % | DIASTOLIC BLOOD PRESSURE: 80 MMHG | SYSTOLIC BLOOD PRESSURE: 142 MMHG | HEART RATE: 90 BPM | WEIGHT: 151 LBS | HEIGHT: 63 IN | BODY MASS INDEX: 26.75 KG/M2 | RESPIRATION RATE: 16 BRPM | TEMPERATURE: 98.3 F

## 2019-11-18 DIAGNOSIS — K21.9 GASTROESOPHAGEAL REFLUX DISEASE WITHOUT ESOPHAGITIS: ICD-10-CM

## 2019-11-18 DIAGNOSIS — F41.9 ANXIETY: ICD-10-CM

## 2019-11-18 DIAGNOSIS — M19.042 PRIMARY OSTEOARTHRITIS OF BOTH HANDS: ICD-10-CM

## 2019-11-18 DIAGNOSIS — Z00.00 HEALTH CARE MAINTENANCE: Primary | ICD-10-CM

## 2019-11-18 DIAGNOSIS — D05.11 DUCTAL CARCINOMA IN SITU (DCIS) OF RIGHT BREAST: ICD-10-CM

## 2019-11-18 DIAGNOSIS — J06.9 ACUTE URI: ICD-10-CM

## 2019-11-18 DIAGNOSIS — J20.9 ACUTE BRONCHITIS, UNSPECIFIED ORGANISM: ICD-10-CM

## 2019-11-18 DIAGNOSIS — M50.30 DDD (DEGENERATIVE DISC DISEASE), CERVICAL: ICD-10-CM

## 2019-11-18 DIAGNOSIS — C34.92 ADENOCARCINOMA OF LUNG, STAGE 1, LEFT (HCC): ICD-10-CM

## 2019-11-18 DIAGNOSIS — E03.9 ACQUIRED HYPOTHYROIDISM: ICD-10-CM

## 2019-11-18 DIAGNOSIS — E78.01 FAMILIAL HYPERCHOLESTEROLEMIA: ICD-10-CM

## 2019-11-18 DIAGNOSIS — M19.041 PRIMARY OSTEOARTHRITIS OF BOTH HANDS: ICD-10-CM

## 2019-11-18 DIAGNOSIS — I10 ESSENTIAL HYPERTENSION: ICD-10-CM

## 2019-11-18 LAB — HBA1C MFR BLD: 5.6 %

## 2019-11-18 PROCEDURE — 84439 ASSAY OF FREE THYROXINE: CPT | Performed by: NURSE PRACTITIONER

## 2019-11-18 PROCEDURE — 80061 LIPID PANEL: CPT | Performed by: NURSE PRACTITIONER

## 2019-11-18 PROCEDURE — 82306 VITAMIN D 25 HYDROXY: CPT | Performed by: NURSE PRACTITIONER

## 2019-11-18 PROCEDURE — 99396 PREV VISIT EST AGE 40-64: CPT | Performed by: NURSE PRACTITIONER

## 2019-11-18 PROCEDURE — 84443 ASSAY THYROID STIM HORMONE: CPT | Performed by: NURSE PRACTITIONER

## 2019-11-18 PROCEDURE — 80053 COMPREHEN METABOLIC PANEL: CPT | Performed by: NURSE PRACTITIONER

## 2019-11-18 PROCEDURE — 85025 COMPLETE CBC W/AUTO DIFF WBC: CPT | Performed by: NURSE PRACTITIONER

## 2019-11-18 PROCEDURE — 83036 HEMOGLOBIN GLYCOSYLATED A1C: CPT | Performed by: NURSE PRACTITIONER

## 2019-11-18 PROCEDURE — 82607 VITAMIN B-12: CPT | Performed by: NURSE PRACTITIONER

## 2019-11-18 PROCEDURE — 83735 ASSAY OF MAGNESIUM: CPT | Performed by: NURSE PRACTITIONER

## 2019-11-18 RX ORDER — ATORVASTATIN CALCIUM 20 MG/1
20 TABLET, FILM COATED ORAL DAILY
Qty: 90 TABLET | Refills: 3 | Status: SHIPPED | OUTPATIENT
Start: 2019-11-18 | End: 2020-08-28

## 2019-11-18 RX ORDER — VENLAFAXINE HYDROCHLORIDE 75 MG/1
CAPSULE, EXTENDED RELEASE ORAL
COMMUNITY
Start: 2019-08-20 | End: 2019-11-18 | Stop reason: SDUPTHER

## 2019-11-18 RX ORDER — FAMOTIDINE 40 MG/1
40 TABLET, FILM COATED ORAL 2 TIMES DAILY
Qty: 180 TABLET | Refills: 3 | Status: SHIPPED | OUTPATIENT
Start: 2019-11-18 | End: 2019-12-11

## 2019-11-18 RX ORDER — ALBUTEROL SULFATE 90 UG/1
2 AEROSOL, METERED RESPIRATORY (INHALATION)
Qty: 1 INHALER | Refills: 2 | Status: SHIPPED | OUTPATIENT
Start: 2019-11-18 | End: 2019-12-27 | Stop reason: SINTOL

## 2019-11-18 RX ORDER — ERGOCALCIFEROL (VITAMIN D2) 10 MCG
800 TABLET ORAL DAILY
COMMUNITY

## 2019-11-18 RX ORDER — PHENOL 1.4 %
600 AEROSOL, SPRAY (ML) MUCOUS MEMBRANE DAILY
COMMUNITY

## 2019-11-18 RX ORDER — LEVOTHYROXINE SODIUM 125 MCG
125 TABLET ORAL DAILY
Qty: 90 TABLET | Refills: 3 | Status: SHIPPED | OUTPATIENT
Start: 2019-11-18 | End: 2020-10-30 | Stop reason: SDUPTHER

## 2019-11-18 RX ORDER — HYDROCHLOROTHIAZIDE 12.5 MG/1
12.5 CAPSULE, GELATIN COATED ORAL DAILY
Qty: 90 CAPSULE | Refills: 3 | Status: SHIPPED | OUTPATIENT
Start: 2019-11-18 | End: 2020-01-24 | Stop reason: SDUPTHER

## 2019-11-18 RX ORDER — LETROZOLE 2.5 MG/1
2.5 TABLET, FILM COATED ORAL DAILY
COMMUNITY
Start: 2019-10-30

## 2019-11-18 RX ORDER — FAMOTIDINE 40 MG/1
TABLET, FILM COATED ORAL
Qty: 30 TABLET | Refills: 0 | Status: CANCELLED | OUTPATIENT
Start: 2019-11-18

## 2019-11-18 RX ORDER — VENLAFAXINE HYDROCHLORIDE 75 MG/1
75 CAPSULE, EXTENDED RELEASE ORAL DAILY
Qty: 90 CAPSULE | Refills: 3 | Status: SHIPPED | OUTPATIENT
Start: 2019-11-18 | End: 2020-01-29 | Stop reason: DRUGHIGH

## 2019-11-18 NOTE — PROGRESS NOTES
Subjective   Linn Bean is a 64 y.o. female and is here for a comprehensive physical exam. The patient reports fatigue and dry cough. Patient reports last physical date of 2018.    History of breast and lung cancer. She is up to date on all screenings. Due for CT in December. Abi Koo is Oncologist at [facility].    Patient rates their health as good. Describes diet as Well Balanced Diet. Exercises by walking. Employed full-time in the safety industry. Lives with spouse. Dental exam every 6 months-yes. Brushes teeth twice a day-yes. Vision exam in last 12 months-yes.    Do you take any herbs or supplements that were not prescribed by a doctor? no  Are you taking aspirin daily? no  Family history of ovarian cancer? yes  Family history of breast cancer? yes  FH of endometrial cancer? no  FH of cervical cancer? no  FH of colon cancer? no    Cancer Screening  Mammogram up-to-date?  yes  BMD up-to-date? yes  Colonoscopy up-to-date? yes      History:  LMP: No LMP recorded. Patient has had a hysterectomy.    Immunization History  Tdap? yes  HPV? not applicable  Pneumonia? yes  Shingles? no    The following portions of the patient's history were reviewed and updated as appropriate: allergies, current medications, past family history, past medical history, past social history, past surgical history and problem list.    Past Medical History:   Diagnosis Date   • Adenocarcinoma of lung, stage 1, left (CMS/HCC) 2018    LLL   • DDD (degenerative disc disease), cervical    • Ductal carcinoma in situ (DCIS) of right breast 2018   • LUCILLE (generalized anxiety disorder)    • HLD (hyperlipidemia)    • HTN (hypertension)    • Hypothyroidism    • Osteoarthritis of hands, bilateral        Family History   Problem Relation Age of Onset   • Asthma Mother    • Ovarian cancer Mother    • Hypothyroidism Mother    • Heart failure Father    • Prostate cancer Father    • Heart attack Father    • Breast cancer Sister        Past Surgical  History:   Procedure Laterality Date   • BREAST LUMPECTOMY Left 1998   • BREAST LUMPECTOMY Right 2018   • BUNIONECTOMY     • COLONOSCOPY  2014   • EYE PTOSIS REPAIR Bilateral 2017   • HYSTERECTOMY  2000   • KNEE ARTHROSCOPY Left 2014   • LUNG LOBECTOMY Left 2018    LLL   • REPLACEMENT TOTAL KNEE Left 2015   • THORACOTOMY Left 2018   • TONSILLECTOMY  1961   • TUBAL ABDOMINAL LIGATION  1993       Social History     Socioeconomic History   • Marital status:      Spouse name: Mahesh   • Number of children: 2   • Years of education: College   • Highest education level: Not on file   Occupational History   • Occupation: [employer]     Employer: [employer]   Tobacco Use   • Smoking status: Never Smoker   • Smokeless tobacco: Never Used   Substance and Sexual Activity   • Alcohol use: Yes     Alcohol/week: 3.0 oz     Types: 5 Glasses of wine per week   • Drug use: No   • Sexual activity: Yes     Partners: Male       Review of Systems  Do you have pain that bothers you in your daily life? no  Review of Systems   Constitutional: Positive for fatigue. Negative for fever and unexpected weight change.   HENT: Negative for congestion, hearing loss, nosebleeds, rhinorrhea, sore throat, trouble swallowing and voice change.    Eyes: Negative for pain, discharge, redness and visual disturbance.   Respiratory: Positive for cough and shortness of breath. Negative for chest tightness and wheezing.    Cardiovascular: Negative for chest pain, palpitations and leg swelling.   Gastrointestinal: Negative for abdominal distention, abdominal pain, anal bleeding, blood in stool, constipation, diarrhea, nausea and vomiting.   Endocrine: Negative for cold intolerance, heat intolerance, polydipsia, polyphagia and polyuria.   Genitourinary: Negative for dysuria, flank pain, frequency and hematuria.   Musculoskeletal: Negative for arthralgias, gait problem, joint swelling and myalgias.   Skin: Negative for color change and rash.   Neurological: Negative  for dizziness, tremors, seizures, syncope, speech difficulty, weakness, numbness and headaches.   Hematological: Negative.    Psychiatric/Behavioral: Negative.        Objective   Physical Exam   Constitutional: She is oriented to person, place, and time. She appears well-developed and well-nourished. No distress.   Raspy voice   HENT:   Head: Normocephalic and atraumatic.   Right Ear: Tympanic membrane and external ear normal.   Left Ear: Tympanic membrane and external ear normal.   Nose: Nose normal.   Mouth/Throat: Oropharynx is clear and moist. No oropharyngeal exudate.   Eyes: Conjunctivae are normal. Pupils are equal, round, and reactive to light. Right eye exhibits no discharge. Left eye exhibits no discharge. No scleral icterus.   Neck: Neck supple. No tracheal deviation present. No thyromegaly present.   Cardiovascular: Normal rate, regular rhythm, normal heart sounds and intact distal pulses. Exam reveals no gallop and no friction rub.   No murmur heard.  Pulmonary/Chest: Effort normal and breath sounds normal. No respiratory distress. She has no wheezes.   Abdominal: Soft. Bowel sounds are normal. She exhibits no distension and no mass. There is no tenderness.   Musculoskeletal: Normal range of motion. She exhibits tenderness and deformity. She exhibits no edema.   Left hand DIP with cindy's nodes   Lymphadenopathy:     She has no cervical adenopathy.   Neurological: She is alert and oriented to person, place, and time. She displays normal reflexes. No cranial nerve deficit or sensory deficit. She exhibits normal muscle tone. Coordination normal.   Skin: Skin is warm and dry. Capillary refill takes less than 2 seconds. No rash noted. No erythema. No pallor.   Psychiatric: She has a normal mood and affect. Her speech is normal and behavior is normal. Judgment and thought content normal.   Nursing note and vitals reviewed.       Linn was seen today for annual exam.    Diagnoses and all orders for this  visit:    Health care maintenance  -     POC Glycosylated Hemoglobin (Hb A1C)  -     CBC & Differential  -     Lipid Panel  -     Vitamin D 25 Hydroxy  -     Vitamin B12  -     Magnesium  -     CBC Auto Differential    Familial hypercholesterolemia  -     Comprehensive Metabolic Panel  -     atorvastatin (LIPITOR) 20 MG tablet; Take 1 tablet by mouth Daily.    Essential hypertension  -     hydroCHLOROthiazide (MICROZIDE) 12.5 MG capsule; Take 1 capsule by mouth Daily.    Gastroesophageal reflux disease without esophagitis  Comments:  Take PPI in am on empty stomach. Eat 30min later. If no improvment, change to protonix.  Orders:  -     famotidine (PEPCID) 40 MG tablet; Take 1 tablet by mouth 2 (Two) Times a Day.    Acquired hypothyroidism  -     T4, Free  -     TSH  -     SYNTHROID 125 MCG tablet; Take 1 tablet by mouth Daily.    Acute URI    Acute bronchitis, unspecified organism  -     albuterol sulfate  (90 Base) MCG/ACT inhaler; Inhale 2 puffs 4 (Four) Times a Day.    Anxiety  -     venlafaxine XR (EFFEXOR-XR) 75 MG 24 hr capsule; Take 1 capsule by mouth Daily.    Adenocarcinoma of lung, stage 1, left (CMS/HCC)    DDD (degenerative disc disease), cervical    Primary osteoarthritis of both hands    Ductal carcinoma in situ (DCIS) of right breast      1. For her cough and sob. She had a recent bronchitis. Will have her use the Albuterol MDI 2 puffs qid for next weeks. If not improvement get cxr and ekg.  2. Patient Counseling:  --Nutrition: Stressed importance of moderation in sodium/caffeine intake, saturated fat and cholesterol, caloric balance, sufficient intake of fresh fruits, vegetables, fiber, calcium, iron  --Exercise: Stressed the importance of exercise 5 times a week  --Injury prevention: Discussed safety belts,smoke detector.  --Dental health: Discussed importance of regular tooth brushing, flossing, and dental visits.  --Immunizations reviewed.  --Discussed benefits of screening  colonoscopy.  --Discussed benefits of screening mammogram.  --After hours service discussed with patient, and appropriate use of the ER.  --Discussed the importance of medication compliance, follow up with me and other health care providers, annual physical, fasting labs, and age appropriate screenings.    3. Discussed the patient's BMI with her.  The BMI is above average; BMI management plan is completed  4. Follow up next physical in 1 year  5. Discussed the nature of the disease including, risks, complications, implications, management, safe and proper use of medications. Encouraged therapeutic lifestyle changes including low calorie diet with plenty of fruits and vegetables, daily exercise, medication compliance, and keeping scheduled follow up appointments with me and any other providers. Encouraged patient to have appointment for complete physical, fasting labs, appropriate screenings, and immunizations on an annual basis.  6. DEXA, mammogram, colonoscopy are up to date. Status post [facility] BSO.  7. Suggest Shingrix.  8. Follow up with Oncology.

## 2019-11-18 NOTE — PROGRESS NOTES
Subjective   Linn Bean is a 64 y.o. female.     History of Present Illness     Outpatient Encounter Medications as of 11/18/2019   Medication Sig Dispense Refill   • albuterol sulfate  (90 Base) MCG/ACT inhaler Inhale 2 puffs 4 (Four) Times a Day. 1 inhaler 2   • atorvastatin (LIPITOR) 20 MG tablet Take 1 tablet by mouth Daily. 90 tablet 3   • calcium carbonate (OS-SAAD) 600 MG tablet Take 600 mg by mouth Daily.     • famotidine (PEPCID) 40 MG tablet Take 1 tablet by mouth 2 (Two) Times a Day. 180 tablet 3   • hydroCHLOROthiazide (MICROZIDE) 12.5 MG capsule Take 1 capsule by mouth Daily. 90 capsule 3   • letrozole (FEMARA) 2.5 MG tablet      • SYNTHROID 125 MCG tablet Take 1 tablet by mouth Daily. 90 tablet 3   • venlafaxine XR (EFFEXOR-XR) 75 MG 24 hr capsule Take 1 capsule by mouth Daily. 90 capsule 3   • Vitamin D, Cholecalciferol, (CHOLECALCIFEROL) 10 MCG (400 UNIT) tablet Take 800 Units by mouth Daily.     • [DISCONTINUED] albuterol sulfate  (90 Base) MCG/ACT inhaler Inhale 2 puffs 4 (Four) Times a Day. 1 inhaler 0   • [DISCONTINUED] aspirin 81 MG chewable tablet Chew 81 mg Daily.     • [DISCONTINUED] atorvastatin (LIPITOR) 20 MG tablet TAKE ONE TABLET BY MOUTH DAILY 30 tablet 0   • [DISCONTINUED] famotidine (PEPCID) 40 MG tablet TAKE ONE TABLET BY MOUTH EVERY NIGHT AT BEDTIME AS NEEDED FOR HEARTBURN 30 tablet 0   • [DISCONTINUED] hydroCHLOROthiazide (MICROZIDE) 12.5 MG capsule TAKE ONE CAPSULE BY MOUTH DAILY 30 capsule 0   • [DISCONTINUED] SYNTHROID 125 MCG tablet      • [DISCONTINUED] venlafaxine XR (EFFEXOR-XR) 75 MG 24 hr capsule      • [DISCONTINUED] anastrozole (ARIMIDEX) 1 MG tablet      • [DISCONTINUED] benzonatate (TESSALON) 200 MG capsule Take 1 capsule by mouth 3 (Three) Times a Day As Needed for Cough. 12 capsule 0   • [DISCONTINUED] doxycycline (VIBRAMYCIN) 100 MG capsule Take 1 capsule by mouth 2 (Two) Times a Day. 20 capsule 0   • [DISCONTINUED] FLUoxetine (PROzac) 10 MG capsule  "Take 1 capsule by mouth Daily. 90 capsule 3   • [DISCONTINUED] levothyroxine (SYNTHROID, LEVOTHROID) 112 MCG tablet Take 1 tablet by mouth Daily. 90 tablet 3   • [DISCONTINUED] methylPREDNISolone (MEDROL, KENIA,) 4 MG tablet Take as directed on package instructions. 21 tablet 0   • [DISCONTINUED] venlafaxine XR (EFFEXOR-XR) 37.5 MG 24 hr capsule        No facility-administered encounter medications on file as of 11/18/2019.        The following portions of the patient's history were reviewed and updated as appropriate: allergies, current medications, past family history, past medical history, past social history, past surgical history and problem list.    Review of Systems   Constitutional: Positive for fatigue. Negative for appetite change, fever, unexpected weight gain and unexpected weight loss.   HENT: Negative for congestion, nosebleeds, sore throat and trouble swallowing.    Eyes: Negative for visual disturbance.   Respiratory: Positive for cough and shortness of breath. Negative for wheezing.    Cardiovascular: Negative for chest pain, palpitations and leg swelling.   Gastrointestinal: Negative for abdominal pain, blood in stool, constipation, diarrhea, nausea and vomiting.   Endocrine: Negative for polydipsia, polyphagia and polyuria.   Genitourinary: Negative for dysuria, frequency and hematuria.   Musculoskeletal: Negative for arthralgias, joint swelling and myalgias.   Skin: Negative for rash.   Neurological: Negative for dizziness, seizures, syncope and numbness.   Hematological: Negative for adenopathy. Does not bruise/bleed easily.   Psychiatric/Behavioral: Negative for behavioral problems, sleep disturbance and depressed mood. The patient is not nervous/anxious.        Objective     Visit Vitals  /80 (BP Location: Right arm, Patient Position: Sitting)   Pulse 90   Temp 98.3 °F (36.8 °C) (Temporal)   Resp 16   Ht 160 cm (63\")   Wt 68.5 kg (151 lb)   SpO2 97%   BMI 26.75 kg/m²       Physical Exam "   Constitutional: She is oriented to person, place, and time. She appears well-developed and well-nourished. No distress.   HENT:   Head: Normocephalic and atraumatic.   Right Ear: Tympanic membrane and external ear normal.   Left Ear: Tympanic membrane and external ear normal.   Nose: Nose normal.   Mouth/Throat: Oropharynx is clear and moist. No oropharyngeal exudate.   Eyes: Conjunctivae are normal. Pupils are equal, round, and reactive to light. Right eye exhibits no discharge. Left eye exhibits no discharge. No scleral icterus.   Neck: Neck supple. No tracheal deviation present. No thyromegaly present.   Cardiovascular: Normal rate, regular rhythm and normal heart sounds. Exam reveals no gallop and no friction rub.   No murmur heard.  Pulmonary/Chest: Effort normal and breath sounds normal. No respiratory distress. She has no wheezes.   Abdominal: Soft. Bowel sounds are normal. She exhibits no distension and no mass. There is no tenderness.   Musculoskeletal: She exhibits no edema or deformity.   Lymphadenopathy:     She has no cervical adenopathy.   Neurological: She is alert and oriented to person, place, and time. Coordination normal.   Skin: Skin is warm and dry. Capillary refill takes less than 2 seconds. No rash noted. No erythema.   Psychiatric: She has a normal mood and affect. Her speech is normal and behavior is normal. Judgment and thought content normal.   Nursing note and vitals reviewed.        Assessment/Plan   Linn was seen today for annual exam.    Diagnoses and all orders for this visit:    Health care maintenance  -     POC Glycosylated Hemoglobin (Hb A1C)  -     CBC & Differential  -     Lipid Panel  -     Vitamin D 25 Hydroxy  -     Vitamin B12  -     Magnesium  -     CBC Auto Differential    Familial hypercholesterolemia  -     Comprehensive Metabolic Panel  -     atorvastatin (LIPITOR) 20 MG tablet; Take 1 tablet by mouth Daily.    Essential hypertension  -     hydroCHLOROthiazide  (MICROZIDE) 12.5 MG capsule; Take 1 capsule by mouth Daily.    Gastroesophageal reflux disease without esophagitis  -     famotidine (PEPCID) 40 MG tablet; Take 1 tablet by mouth 2 (Two) Times a Day.    Acquired hypothyroidism  -     T4, Free  -     TSH  -     SYNTHROID 125 MCG tablet; Take 1 tablet by mouth Daily.    Acute URI    Acute bronchitis, unspecified organism  -     albuterol sulfate  (90 Base) MCG/ACT inhaler; Inhale 2 puffs 4 (Four) Times a Day.    Anxiety  -     venlafaxine XR (EFFEXOR-XR) 75 MG 24 hr capsule; Take 1 capsule by mouth Daily.

## 2019-11-19 ENCOUNTER — TELEPHONE (OUTPATIENT)
Dept: FAMILY MEDICINE CLINIC | Facility: CLINIC | Age: 64
End: 2019-11-19

## 2019-11-19 LAB
25(OH)D3 SERPL-MCNC: 30 NG/ML (ref 30–100)
ALBUMIN SERPL-MCNC: 4.2 G/DL (ref 3.5–5.2)
ALBUMIN/GLOB SERPL: 1.3 G/DL
ALP SERPL-CCNC: 112 U/L (ref 39–117)
ALT SERPL W P-5'-P-CCNC: 41 U/L (ref 1–33)
ANION GAP SERPL CALCULATED.3IONS-SCNC: 12.4 MMOL/L (ref 5–15)
AST SERPL-CCNC: 31 U/L (ref 1–32)
BASOPHILS # BLD AUTO: 0.07 10*3/MM3 (ref 0–0.2)
BASOPHILS NFR BLD AUTO: 1.6 % (ref 0–1.5)
BILIRUB SERPL-MCNC: 0.4 MG/DL (ref 0.2–1.2)
BUN BLD-MCNC: 10 MG/DL (ref 8–23)
BUN/CREAT SERPL: 18.2 (ref 7–25)
CALCIUM SPEC-SCNC: 9.8 MG/DL (ref 8.6–10.5)
CHLORIDE SERPL-SCNC: 101 MMOL/L (ref 98–107)
CHOLEST SERPL-MCNC: 228 MG/DL (ref 0–200)
CO2 SERPL-SCNC: 25.6 MMOL/L (ref 22–29)
CREAT BLD-MCNC: 0.55 MG/DL (ref 0.57–1)
DEPRECATED RDW RBC AUTO: 43 FL (ref 37–54)
EOSINOPHIL # BLD AUTO: 0.18 10*3/MM3 (ref 0–0.4)
EOSINOPHIL NFR BLD AUTO: 4 % (ref 0.3–6.2)
ERYTHROCYTE [DISTWIDTH] IN BLOOD BY AUTOMATED COUNT: 12.5 % (ref 12.3–15.4)
GFR SERPL CREATININE-BSD FRML MDRD: 112 ML/MIN/1.73
GLOBULIN UR ELPH-MCNC: 3.2 GM/DL
GLUCOSE BLD-MCNC: 103 MG/DL (ref 65–99)
HCT VFR BLD AUTO: 42.5 % (ref 34–46.6)
HDLC SERPL-MCNC: 59 MG/DL (ref 40–60)
HGB BLD-MCNC: 14.5 G/DL (ref 12–15.9)
IMM GRANULOCYTES # BLD AUTO: 0.03 10*3/MM3 (ref 0–0.05)
IMM GRANULOCYTES NFR BLD AUTO: 0.7 % (ref 0–0.5)
LDLC SERPL CALC-MCNC: 120 MG/DL (ref 0–100)
LDLC/HDLC SERPL: 2.03 {RATIO}
LYMPHOCYTES # BLD AUTO: 1 10*3/MM3 (ref 0.7–3.1)
LYMPHOCYTES NFR BLD AUTO: 22.4 % (ref 19.6–45.3)
MAGNESIUM SERPL-MCNC: 2 MG/DL (ref 1.6–2.4)
MCH RBC QN AUTO: 32.2 PG (ref 26.6–33)
MCHC RBC AUTO-ENTMCNC: 34.1 G/DL (ref 31.5–35.7)
MCV RBC AUTO: 94.4 FL (ref 79–97)
MONOCYTES # BLD AUTO: 0.66 10*3/MM3 (ref 0.1–0.9)
MONOCYTES NFR BLD AUTO: 14.8 % (ref 5–12)
NEUTROPHILS # BLD AUTO: 2.53 10*3/MM3 (ref 1.7–7)
NEUTROPHILS NFR BLD AUTO: 56.5 % (ref 42.7–76)
NRBC BLD AUTO-RTO: 0 /100 WBC (ref 0–0.2)
PLATELET # BLD AUTO: 269 10*3/MM3 (ref 140–450)
PMV BLD AUTO: 10.5 FL (ref 6–12)
POTASSIUM BLD-SCNC: 4 MMOL/L (ref 3.5–5.2)
PROT SERPL-MCNC: 7.4 G/DL (ref 6–8.5)
RBC # BLD AUTO: 4.5 10*6/MM3 (ref 3.77–5.28)
SODIUM BLD-SCNC: 139 MMOL/L (ref 136–145)
T4 FREE SERPL-MCNC: 1.46 NG/DL (ref 0.93–1.7)
TRIGL SERPL-MCNC: 247 MG/DL (ref 0–150)
TSH SERPL DL<=0.05 MIU/L-ACNC: 1.3 UIU/ML (ref 0.27–4.2)
VIT B12 BLD-MCNC: 275 PG/ML (ref 211–946)
VLDLC SERPL-MCNC: 49.4 MG/DL (ref 5–40)
WBC NRBC COR # BLD: 4.47 10*3/MM3 (ref 3.4–10.8)

## 2019-11-19 NOTE — TELEPHONE ENCOUNTER
Left message to discuss labs. Overall, labs are good. Lipids are elevated. ASCVD risk10 year 7%, lifetime 2.9%. She can either double her efforts for diet and exercise or consider increasing dose of statin to moderate intensity statin.

## 2019-11-25 DIAGNOSIS — I10 ESSENTIAL HYPERTENSION: ICD-10-CM

## 2019-11-25 DIAGNOSIS — E78.01 FAMILIAL HYPERCHOLESTEROLEMIA: ICD-10-CM

## 2019-11-25 RX ORDER — ATORVASTATIN CALCIUM 20 MG/1
TABLET, FILM COATED ORAL
Qty: 30 TABLET | Refills: 0 | OUTPATIENT
Start: 2019-11-25

## 2019-11-25 RX ORDER — HYDROCHLOROTHIAZIDE 12.5 MG/1
CAPSULE, GELATIN COATED ORAL
Qty: 30 CAPSULE | Refills: 0 | OUTPATIENT
Start: 2019-11-25

## 2019-12-11 ENCOUNTER — TELEPHONE (OUTPATIENT)
Dept: FAMILY MEDICINE CLINIC | Facility: CLINIC | Age: 64
End: 2019-12-11

## 2019-12-11 DIAGNOSIS — K21.9 GASTROESOPHAGEAL REFLUX DISEASE WITHOUT ESOPHAGITIS: Primary | ICD-10-CM

## 2019-12-11 RX ORDER — OMEPRAZOLE 20 MG/1
20 CAPSULE, DELAYED RELEASE ORAL DAILY
Qty: 30 CAPSULE | Refills: 5 | Status: SHIPPED | OUTPATIENT
Start: 2019-12-11 | End: 2020-06-17 | Stop reason: SDUPTHER

## 2019-12-11 NOTE — TELEPHONE ENCOUNTER
Patient complaining of increased heartburn not controlled by pepcid.  Also noted elevated blood pressures 170/95. Will stop pepcid and start prilosec with GERD precautions. Increase HCTZ to 25mg and continue to monitor bp. She can come in for EKG if she would like.

## 2019-12-27 ENCOUNTER — OFFICE VISIT (OUTPATIENT)
Dept: FAMILY MEDICINE CLINIC | Facility: CLINIC | Age: 64
End: 2019-12-27

## 2019-12-27 VITALS
TEMPERATURE: 98.1 F | DIASTOLIC BLOOD PRESSURE: 98 MMHG | BODY MASS INDEX: 26.22 KG/M2 | RESPIRATION RATE: 21 BRPM | OXYGEN SATURATION: 96 % | HEIGHT: 63 IN | SYSTOLIC BLOOD PRESSURE: 142 MMHG | WEIGHT: 148 LBS | HEART RATE: 100 BPM

## 2019-12-27 DIAGNOSIS — I10 ESSENTIAL HYPERTENSION: Primary | ICD-10-CM

## 2019-12-27 DIAGNOSIS — R00.2 INTERMITTENT PALPITATIONS: ICD-10-CM

## 2019-12-27 PROCEDURE — 93000 ELECTROCARDIOGRAM COMPLETE: CPT | Performed by: NURSE PRACTITIONER

## 2019-12-27 PROCEDURE — 99213 OFFICE O/P EST LOW 20 MIN: CPT | Performed by: NURSE PRACTITIONER

## 2019-12-27 RX ORDER — LISINOPRIL 10 MG/1
10 TABLET ORAL DAILY
Qty: 30 TABLET | Refills: 1 | Status: SHIPPED | OUTPATIENT
Start: 2019-12-27 | End: 2020-01-29 | Stop reason: SINTOL

## 2019-12-27 NOTE — PROGRESS NOTES
Subjective   Linn Bean is a 64 y.o. female.     Ms. Bean presents today for re-evaluation/management HTN. Patient has a blood pressure monitor at home and states when she has been checking it the readings have been consistently high despite the fact that she is taking HCTZ 25 mg/day. She states that the systolic has been averaging in the 150's (high of 174 on one occasion) and the diastolic in the 90's-100's.     Hypertension   This is a new problem. The current episode started more than 1 month ago. The problem is unchanged. Associated symptoms include anxiety, headaches, malaise/fatigue and palpitations (occasional). Pertinent negatives include no chest pain, orthopnea, peripheral edema, PND, shortness of breath or sweats. Agents associated with hypertension include thyroid hormones. Risk factors for coronary artery disease include post-menopausal state, stress, dyslipidemia and family history. Past treatments include diuretics. Current antihypertension treatment includes diuretics. The current treatment provides mild improvement. There are no compliance problems.  Identifiable causes of hypertension include a thyroid problem.       The following portions of the patient's history were reviewed and updated as appropriate: allergies, current medications, past family history, past medical history, past social history, past surgical history and problem list.    Allergies as of 12/27/2019 - Reviewed 12/27/2019   Allergen Reaction Noted   • Codeine Itching 10/13/2019       Current Outpatient Medications on File Prior to Visit   Medication Sig   • atorvastatin (LIPITOR) 20 MG tablet Take 1 tablet by mouth Daily.   • calcium carbonate (OS-SAAD) 600 MG tablet Take 600 mg by mouth Daily.   • hydroCHLOROthiazide (MICROZIDE) 12.5 MG capsule Take 1 capsule by mouth Daily. (Patient taking differently: Take 25 mg by mouth Daily.)   • omeprazole (priLOSEC) 20 MG capsule Take 1 capsule by mouth Daily.   • SYNTHROID 125 MCG  tablet Take 1 tablet by mouth Daily.   • venlafaxine XR (EFFEXOR-XR) 75 MG 24 hr capsule Take 1 capsule by mouth Daily. (Patient taking differently: Take 150 mg by mouth Daily.)   • Vitamin D, Cholecalciferol, (CHOLECALCIFEROL) 10 MCG (400 UNIT) tablet Take 800 Units by mouth Daily.   • [DISCONTINUED] albuterol sulfate  (90 Base) MCG/ACT inhaler Inhale 2 puffs 4 (Four) Times a Day.   • letrozole (FEMARA) 2.5 MG tablet    • [DISCONTINUED] Anastrozole (ARIMIDEX PO) anastrozole     No current facility-administered medications on file prior to visit.        Review of Systems   Constitutional: Positive for malaise/fatigue. Negative for activity change, appetite change, chills, diaphoresis, fatigue, fever and unexpected weight change.   Respiratory: Negative.  Negative for shortness of breath.    Cardiovascular: Positive for palpitations (occasional). Negative for chest pain, orthopnea, leg swelling and PND.   Gastrointestinal: Negative.    Neurological: Positive for headaches. Negative for dizziness, tremors, seizures, syncope, facial asymmetry, speech difficulty, weakness, light-headedness and numbness.       Objective   Physical Exam   Constitutional: She is oriented to person, place, and time. Vital signs are normal. She appears well-developed and well-nourished. She is cooperative. She does not appear ill. No distress.   HENT:   Right Ear: Tympanic membrane normal.   Left Ear: Tympanic membrane normal.   Mouth/Throat: Uvula is midline and mucous membranes are normal.   Neck: Carotid bruit is not present.   Cardiovascular: Normal rate, regular rhythm, S1 normal, S2 normal and normal heart sounds.   No murmur heard.  Pulmonary/Chest: Effort normal and breath sounds normal.     Vascular Status -  Her right foot exhibits no edema. Her left foot exhibits no edema.  Neurological: She is alert and oriented to person, place, and time.   Skin: Skin is warm, dry and intact. No rash noted. She is not diaphoretic.    Psychiatric: She has a normal mood and affect. Her behavior is normal. Judgment and thought content normal.   Vitals reviewed.    Vitals:    12/27/19 0838   BP: 142/98   Pulse: 100   Resp: 21   Temp: 98.1 °F (36.7 °C)   SpO2: 96%     Body mass index is 26.22 kg/m².    Assessment/Plan   Linn was seen today for hypertension.    Diagnoses and all orders for this visit:    Essential hypertension  -     lisinopril (PRINIVIL,ZESTRIL) 10 MG tablet; Take 1 tablet by mouth Daily.    Intermittent palpitations  -     ECG 12 Lead         ECG 12 Lead  Date/Time: 12/27/2019 8:58 AM  Performed by: Alicia Jensen APRN  Authorized by: Alicia Jensen APRN   Comparison: compared with previous ECG from 10/17/2017  Similar to previous ECG  Rhythm: sinus rhythm  Rate: normal  BPM: 82  Conduction: conduction normal  QRS axis: normal          Continue HCTZ 25 mg/day. Continue to monitor blood pressure regularly and keep a log of readings. Report abnormal results.     Discussed the nature of the medical condition(s) risks, complications, implications, management, safe and proper use of medications. Encouraged medication compliance, and keeping scheduled follow up appointments with me and any other providers.     F/U in one month. Bring log of blood pressure readings to office visit.

## 2019-12-27 NOTE — PATIENT INSTRUCTIONS
"Hypertension  Hypertension, commonly called high blood pressure, is when the force of blood pumping through the arteries is too strong. The arteries are the blood vessels that carry blood from the heart throughout the body. Hypertension forces the heart to work harder to pump blood and may cause arteries to become narrow or stiff. Having untreated or uncontrolled hypertension can cause heart attacks, strokes, kidney disease, and other problems.  A blood pressure reading consists of a higher number over a lower number. Ideally, your blood pressure should be below 120/80. The first (\"top\") number is called the systolic pressure. It is a measure of the pressure in your arteries as your heart beats. The second (\"bottom\") number is called the diastolic pressure. It is a measure of the pressure in your arteries as the heart relaxes.  What are the causes?  The cause of this condition is not known.  What increases the risk?  Some risk factors for high blood pressure are under your control. Others are not.  Factors you can change  · Smoking.  · Having type 2 diabetes mellitus, high cholesterol, or both.  · Not getting enough exercise or physical activity.  · Being overweight.  · Having too much fat, sugar, calories, or salt (sodium) in your diet.  · Drinking too much alcohol.  Factors that are difficult or impossible to change  · Having chronic kidney disease.  · Having a family history of high blood pressure.  · Age. Risk increases with age.  · Race. You may be at higher risk if you are -American.  · Gender. Men are at higher risk than women before age 45. After age 65, women are at higher risk than men.  · Having obstructive sleep apnea.  · Stress.  What are the signs or symptoms?  Extremely high blood pressure (hypertensive crisis) may cause:  · Headache.  · Anxiety.  · Shortness of breath.  · Nosebleed.  · Nausea and vomiting.  · Severe chest pain.  · Jerky movements you cannot control (seizures).  How is this " diagnosed?  This condition is diagnosed by measuring your blood pressure while you are seated, with your arm resting on a surface. The cuff of the blood pressure monitor will be placed directly against the skin of your upper arm at the level of your heart. It should be measured at least twice using the same arm. Certain conditions can cause a difference in blood pressure between your right and left arms.  Certain factors can cause blood pressure readings to be lower or higher than normal (elevated) for a short period of time:  · When your blood pressure is higher when you are in a health care provider's office than when you are at home, this is called white coat hypertension. Most people with this condition do not need medicines.  · When your blood pressure is higher at home than when you are in a health care provider's office, this is called masked hypertension. Most people with this condition may need medicines to control blood pressure.  If you have a high blood pressure reading during one visit or you have normal blood pressure with other risk factors:  · You may be asked to return on a different day to have your blood pressure checked again.  · You may be asked to monitor your blood pressure at home for 1 week or longer.  If you are diagnosed with hypertension, you may have other blood or imaging tests to help your health care provider understand your overall risk for other conditions.  How is this treated?  This condition is treated by making healthy lifestyle changes, such as eating healthy foods, exercising more, and reducing your alcohol intake. Your health care provider may prescribe medicine if lifestyle changes are not enough to get your blood pressure under control, and if:  · Your systolic blood pressure is above 130.  · Your diastolic blood pressure is above 80.  Your personal target blood pressure may vary depending on your medical conditions, your age, and other factors.  Follow these instructions  at home:  Eating and drinking    · Eat a diet that is high in fiber and potassium, and low in sodium, added sugar, and fat. An example eating plan is called the DASH (Dietary Approaches to Stop Hypertension) diet. To eat this way:  ? Eat plenty of fresh fruits and vegetables. Try to fill half of your plate at each meal with fruits and vegetables.  ? Eat whole grains, such as whole wheat pasta, brown rice, or whole grain bread. Fill about one quarter of your plate with whole grains.  ? Eat or drink low-fat dairy products, such as skim milk or low-fat yogurt.  ? Avoid fatty cuts of meat, processed or cured meats, and poultry with skin. Fill about one quarter of your plate with lean proteins, such as fish, chicken without skin, beans, eggs, and tofu.  ? Avoid premade and processed foods. These tend to be higher in sodium, added sugar, and fat.  · Reduce your daily sodium intake. Most people with hypertension should eat less than 1,500 mg of sodium a day.  · Limit alcohol intake to no more than 1 drink a day for nonpregnant women and 2 drinks a day for men. One drink equals 12 oz of beer, 5 oz of wine, or 1½ oz of hard liquor.  Lifestyle    · Work with your health care provider to maintain a healthy body weight or to lose weight. Ask what an ideal weight is for you.  · Get at least 30 minutes of exercise that causes your heart to beat faster (aerobic exercise) most days of the week. Activities may include walking, swimming, or biking.  · Include exercise to strengthen your muscles (resistance exercise), such as pilates or lifting weights, as part of your weekly exercise routine. Try to do these types of exercises for 30 minutes at least 3 days a week.  · Do not use any products that contain nicotine or tobacco, such as cigarettes and e-cigarettes. If you need help quitting, ask your health care provider.  · Monitor your blood pressure at home as told by your health care provider.  · Keep all follow-up visits as told by  your health care provider. This is important.  Medicines  · Take over-the-counter and prescription medicines only as told by your health care provider. Follow directions carefully. Blood pressure medicines must be taken as prescribed.  · Do not skip doses of blood pressure medicine. Doing this puts you at risk for problems and can make the medicine less effective.  · Ask your health care provider about side effects or reactions to medicines that you should watch for.  Contact a health care provider if:  · You think you are having a reaction to a medicine you are taking.  · You have headaches that keep coming back (recurring).  · You feel dizzy.  · You have swelling in your ankles.  · You have trouble with your vision.  Get help right away if:  · You develop a severe headache or confusion.  · You have unusual weakness or numbness.  · You feel faint.  · You have severe pain in your chest or abdomen.  · You vomit repeatedly.  · You have trouble breathing.  Summary  · Hypertension is when the force of blood pumping through your arteries is too strong. If this condition is not controlled, it may put you at risk for serious complications.  · Your personal target blood pressure may vary depending on your medical conditions, your age, and other factors. For most people, a normal blood pressure is less than 120/80.  · Hypertension is treated with lifestyle changes, medicines, or a combination of both. Lifestyle changes include weight loss, eating a healthy, low-sodium diet, exercising more, and limiting alcohol.  This information is not intended to replace advice given to you by your health care provider. Make sure you discuss any questions you have with your health care provider.  Document Released: 12/18/2006 Document Revised: 11/15/2017 Document Reviewed: 11/15/2017  [employer] Interactive Patient Education © 2019 [employer]    Managing Your Hypertension  Hypertension is commonly called high blood pressure. This is when  "the force of your blood pressing against the walls of your arteries is too strong. Arteries are blood vessels that carry blood from your heart throughout your body. Hypertension forces the heart to work harder to pump blood, and may cause the arteries to become narrow or stiff. Having untreated or uncontrolled hypertension can cause heart attack, stroke, kidney disease, and other problems.  What are blood pressure readings?  A blood pressure reading consists of a higher number over a lower number. Ideally, your blood pressure should be below 120/80. The first (\"top\") number is called the systolic pressure. It is a measure of the pressure in your arteries as your heart beats. The second (\"bottom\") number is called the diastolic pressure. It is a measure of the pressure in your arteries as the heart relaxes.  What does my blood pressure reading mean?  Blood pressure is classified into four stages. Based on your blood pressure reading, your health care provider may use the following stages to determine what type of treatment you need, if any. Systolic pressure and diastolic pressure are measured in a unit called mm Hg.  Normal  · Systolic pressure: below 120.  · Diastolic pressure: below 80.  Elevated  · Systolic pressure: 120-129.  · Diastolic pressure: below 80.  Hypertension stage 1  · Systolic pressure: 130-139.  · Diastolic pressure: 80-89.  Hypertension stage 2  · Systolic pressure: 140 or above.  · Diastolic pressure: 90 or above.  What health risks are associated with hypertension?  Managing your hypertension is an important responsibility. Uncontrolled hypertension can lead to:  · A heart attack.  · A stroke.  · A weakened blood vessel (aneurysm).  · Heart failure.  · Kidney damage.  · Eye damage.  · Metabolic syndrome.  · Memory and concentration problems.  What changes can I make to manage my hypertension?  Hypertension can be managed by making lifestyle changes and possibly by taking medicines. Your health " care provider will help you make a plan to bring your blood pressure within a normal range.  Eating and drinking    · Eat a diet that is high in fiber and potassium, and low in salt (sodium), added sugar, and fat. An example eating plan is called the DASH (Dietary Approaches to Stop Hypertension) diet. To eat this way:  ? Eat plenty of fresh fruits and vegetables. Try to fill half of your plate at each meal with fruits and vegetables.  ? Eat whole grains, such as whole wheat pasta, brown rice, or whole grain bread. Fill about one quarter of your plate with whole grains.  ? Eat low-fat diary products.  ? Avoid fatty cuts of meat, processed or cured meats, and poultry with skin. Fill about one quarter of your plate with lean proteins such as fish, chicken without skin, beans, eggs, and tofu.  ? Avoid premade and processed foods. These tend to be higher in sodium, added sugar, and fat.  · Reduce your daily sodium intake. Most people with hypertension should eat less than 1,500 mg of sodium a day.  · Limit alcohol intake to no more than 1 drink a day for nonpregnant women and 2 drinks a day for men. One drink equals 12 oz of beer, 5 oz of wine, or 1½ oz of hard liquor.  Lifestyle  · Work with your health care provider to maintain a healthy body weight, or to lose weight. Ask what an ideal weight is for you.  · Get at least 30 minutes of exercise that causes your heart to beat faster (aerobic exercise) most days of the week. Activities may include walking, swimming, or biking.  · Include exercise to strengthen your muscles (resistance exercise), such as weight lifting, as part of your weekly exercise routine. Try to do these types of exercises for 30 minutes at least 3 days a week.  · Do not use any products that contain nicotine or tobacco, such as cigarettes and e-cigarettes. If you need help quitting, ask your health care provider.  · Control any long-term (chronic) conditions you have, such as high cholesterol or  diabetes.  Monitoring  · Monitor your blood pressure at home as told by your health care provider. Your personal target blood pressure may vary depending on your medical conditions, your age, and other factors.  · Have your blood pressure checked regularly, as often as told by your health care provider.  Working with your health care provider  · Review all the medicines you take with your health care provider because there may be side effects or interactions.  · Talk with your health care provider about your diet, exercise habits, and other lifestyle factors that may be contributing to hypertension.  · Visit your health care provider regularly. Your health care provider can help you create and adjust your plan for managing hypertension.  Will I need medicine to control my blood pressure?  Your health care provider may prescribe medicine if lifestyle changes are not enough to get your blood pressure under control, and if:  · Your systolic blood pressure is 130 or higher.  · Your diastolic blood pressure is 80 or higher.  Take medicines only as told by your health care provider. Follow the directions carefully. Blood pressure medicines must be taken as prescribed. The medicine does not work as well when you skip doses. Skipping doses also puts you at risk for problems.  Contact a health care provider if:  · You think you are having a reaction to medicines you have taken.  · You have repeated (recurrent) headaches.  · You feel dizzy.  · You have swelling in your ankles.  · You have trouble with your vision.  Get help right away if:  · You develop a severe headache or confusion.  · You have unusual weakness or numbness, or you feel faint.  · You have severe pain in your chest or abdomen.  · You vomit repeatedly.  · You have trouble breathing.  Summary  · Hypertension is when the force of blood pumping through your arteries is too strong. If this condition is not controlled, it may put you at risk for serious  "complications.  · Your personal target blood pressure may vary depending on your medical conditions, your age, and other factors. For most people, a normal blood pressure is less than 120/80.  · Hypertension is managed by lifestyle changes, medicines, or both. Lifestyle changes include weight loss, eating a healthy, low-sodium diet, exercising more, and limiting alcohol.  This information is not intended to replace advice given to you by your health care provider. Make sure you discuss any questions you have with your health care provider.  Document Released: 09/11/2013 Document Revised: 11/15/2017 Document Reviewed: 11/15/2017  [employer] Interactive Patient Education © 2019 [employer]    DASH Eating Plan  DASH stands for \"Dietary Approaches to Stop Hypertension.\" The DASH eating plan is a healthy eating plan that has been shown to reduce high blood pressure (hypertension). It may also reduce your risk for type 2 diabetes, heart disease, and stroke. The DASH eating plan may also help with weight loss.  What are tips for following this plan?    General guidelines  · Avoid eating more than 2,300 mg (milligrams) of salt (sodium) a day. If you have hypertension, you may need to reduce your sodium intake to 1,500 mg a day.  · Limit alcohol intake to no more than 1 drink a day for nonpregnant women and 2 drinks a day for men. One drink equals 12 oz of beer, 5 oz of wine, or 1½ oz of hard liquor.  · Work with your health care provider to maintain a healthy body weight or to lose weight. Ask what an ideal weight is for you.  · Get at least 30 minutes of exercise that causes your heart to beat faster (aerobic exercise) most days of the week. Activities may include walking, swimming, or biking.  · Work with your health care provider or diet and nutrition specialist (dietitian) to adjust your eating plan to your individual calorie needs.  Reading food labels    · Check food labels for the amount of sodium per serving. Choose " "foods with less than 5 percent of the Daily Value of sodium. Generally, foods with less than 300 mg of sodium per serving fit into this eating plan.  · To find whole grains, look for the word \"whole\" as the first word in the ingredient list.  Shopping  · Buy products labeled as \"low-sodium\" or \"no salt added.\"  · Buy fresh foods. Avoid canned foods and premade or frozen meals.  Cooking  · Avoid adding salt when cooking. Use salt-free seasonings or herbs instead of table salt or sea salt. Check with your health care provider or pharmacist before using salt substitutes.  · Do not rubin foods. Cook foods using healthy methods such as baking, boiling, grilling, and broiling instead.  · Cook with heart-healthy oils, such as olive, canola, soybean, or sunflower oil.  Meal planning  · Eat a balanced diet that includes:  ? 5 or more servings of fruits and vegetables each day. At each meal, try to fill half of your plate with fruits and vegetables.  ? Up to 6-8 servings of whole grains each day.  ? Less than 6 oz of lean meat, poultry, or fish each day. A 3-oz serving of meat is about the same size as a deck of cards. One egg equals 1 oz.  ? 2 servings of low-fat dairy each day.  ? A serving of nuts, seeds, or beans 5 times each week.  ? Heart-healthy fats. Healthy fats called Omega-3 fatty acids are found in foods such as flaxseeds and coldwater fish, like sardines, salmon, and mackerel.  · Limit how much you eat of the following:  ? Canned or prepackaged foods.  ? Food that is high in trans fat, such as fried foods.  ? Food that is high in saturated fat, such as fatty meat.  ? Sweets, desserts, sugary drinks, and other foods with added sugar.  ? Full-fat dairy products.  · Do not salt foods before eating.  · Try to eat at least 2 vegetarian meals each week.  · Eat more home-cooked food and less restaurant, buffet, and fast food.  · When eating at a restaurant, ask that your food be prepared with less salt or no salt, if " possible.  What foods are recommended?  The items listed may not be a complete list. Talk with your dietitian about what dietary choices are best for you.  Grains  Whole-grain or whole-wheat bread. Whole-grain or whole-wheat pasta. Brown rice. Oatmeal. Quinoa. Bulgur. Whole-grain and low-sodium cereals. Kristy bread. Low-fat, low-sodium crackers. Whole-wheat flour tortillas.  Vegetables  Fresh or frozen vegetables (raw, steamed, roasted, or grilled). Low-sodium or reduced-sodium tomato and vegetable juice. Low-sodium or reduced-sodium tomato sauce and tomato paste. Low-sodium or reduced-sodium canned vegetables.  Fruits  All fresh, dried, or frozen fruit. Canned fruit in natural juice (without added sugar).  Meat and other protein foods  Skinless chicken or turkey. Ground chicken or turkey. Pork with fat trimmed off. Fish and seafood. Egg whites. Dried beans, peas, or lentils. Unsalted nuts, nut butters, and seeds. Unsalted canned beans. Lean cuts of beef with fat trimmed off. Low-sodium, lean deli meat.  Dairy  Low-fat (1%) or fat-free (skim) milk. Fat-free, low-fat, or reduced-fat cheeses. Nonfat, low-sodium ricotta or cottage cheese. Low-fat or nonfat yogurt. Low-fat, low-sodium cheese.  Fats and oils  Soft margarine without trans fats. Vegetable oil. Low-fat, reduced-fat, or light mayonnaise and salad dressings (reduced-sodium). Canola, safflower, olive, soybean, and sunflower oils. Avocado.  Seasoning and other foods  Herbs. Spices. Seasoning mixes without salt. Unsalted popcorn and pretzels. Fat-free sweets.  What foods are not recommended?  The items listed may not be a complete list. Talk with your dietitian about what dietary choices are best for you.  Grains  Baked goods made with fat, such as croissants, muffins, or some breads. Dry pasta or rice meal packs.  Vegetables  Creamed or fried vegetables. Vegetables in a cheese sauce. Regular canned vegetables (not low-sodium or reduced-sodium). Regular canned  tomato sauce and paste (not low-sodium or reduced-sodium). Regular tomato and vegetable juice (not low-sodium or reduced-sodium). Pickles. Olives.  Fruits  Canned fruit in a light or heavy syrup. Fried fruit. Fruit in cream or butter sauce.  Meat and other protein foods  Fatty cuts of meat. Ribs. Fried meat. Yip. Sausage. Bologna and other processed lunch meats. Salami. Fatback. Hotdogs. Bratwurst. Salted nuts and seeds. Canned beans with added salt. Canned or smoked fish. Whole eggs or egg yolks. Chicken or turkey with skin.  Dairy  Whole or 2% milk, cream, and half-and-half. Whole or full-fat cream cheese. Whole-fat or sweetened yogurt. Full-fat cheese. Nondairy creamers. Whipped toppings. Processed cheese and cheese spreads.  Fats and oils  Butter. Stick margarine. Lard. Shortening. Ghee. Yip fat. Tropical oils, such as coconut, palm kernel, or palm oil.  Seasoning and other foods  Salted popcorn and pretzels. Onion salt, garlic salt, seasoned salt, table salt, and sea salt. Worcestershire sauce. Tartar sauce. Barbecue sauce. Teriyaki sauce. Soy sauce, including reduced-sodium. Steak sauce. Canned and packaged gravies. Fish sauce. Oyster sauce. Cocktail sauce. Horseradish that you find on the shelf. Ketchup. Mustard. Meat flavorings and tenderizers. Bouillon cubes. Hot sauce and Tabasco sauce. Premade or packaged marinades. Premade or packaged taco seasonings. Relishes. Regular salad dressings.  Where to find more information:  · National [employer], [employer] [place]: www.nhlbi.nih.gov  · American Heart Association: www.heart.org  Summary  · The DASH eating plan is a healthy eating plan that has been shown to reduce high blood pressure (hypertension). It may also reduce your risk for type 2 diabetes, heart disease, and stroke.  · With the DASH eating plan, you should limit salt (sodium) intake to 2,300 mg a day. If you have hypertension, you may need to reduce your sodium intake to 1,500 mg a day.  · When  on the DASH eating plan, aim to eat more fresh fruits and vegetables, whole grains, lean proteins, low-fat dairy, and heart-healthy fats.  · Work with your health care provider or diet and nutrition specialist (dietitian) to adjust your eating plan to your individual calorie needs.  This information is not intended to replace advice given to you by your health care provider. Make sure you discuss any questions you have with your health care provider.  Document Released: 12/06/2012 Document Revised: 12/11/2017 Document Reviewed: 12/11/2017  [employer] Interactive Patient Education © 2019 [employer]  Hydrochlorothiazide, HCTZ capsules or tablets  What is this medicine?  HYDROCHLOROTHIAZIDE (cesia droe klor oh THYE a zide) is a diuretic. It increases the amount of urine passed, which causes the body to lose salt and water. This medicine is used to treat high blood pressure. It is also reduces the swelling and water retention caused by various medical conditions, such as heart, liver, or kidney disease.  This medicine may be used for other purposes; ask your health care provider or pharmacist if you have questions.  COMMON BRAND NAME(S): Esidrix, Ezide, HydroDIURIL, Microzide, Oretic, Zide  What should I tell my health care provider before I take this medicine?  They need to know if you have any of these conditions:  -diabetes  -gout  -immune system problems, like lupus  -kidney disease or kidney stones  -liver disease  -pancreatitis  -small amount of urine or difficulty passing urine  -an unusual or allergic reaction to hydrochlorothiazide, sulfa drugs, other medicines, foods, dyes, or preservatives  -pregnant or trying to get pregnant  -breast-feeding  How should I use this medicine?  Take this medicine by mouth with a glass of water. Follow the directions on the prescription label. Take your medicine at regular intervals. Remember that you will need to pass urine frequently after taking this medicine. Do not take your  doses at a time of day that will cause you problems. Do not stop taking your medicine unless your doctor tells you to.  Talk to your pediatrician regarding the use of this medicine in children. Special care may be needed.  Overdosage: If you think you have taken too much of this medicine contact a poison control center or emergency room at once.  NOTE: This medicine is only for you. Do not share this medicine with others.  What if I miss a dose?  If you miss a dose, take it as soon as you can. If it is almost time for your next dose, take only that dose. Do not take double or extra doses.  What may interact with this medicine?  -cholestyramine  -colestipol  -digoxin  -dofetilide  -lithium  -medicines for blood pressure  -medicines for diabetes  -medicines that relax muscles for surgery  -other diuretics  -steroid medicines like prednisone or cortisone  This list may not describe all possible interactions. Give your health care provider a list of all the medicines, herbs, non-prescription drugs, or dietary supplements you use. Also tell them if you smoke, drink alcohol, or use illegal drugs. Some items may interact with your medicine.  What should I watch for while using this medicine?  Visit your doctor or health care professional for regular checks on your progress. Check your blood pressure as directed. Ask your doctor or health care professional what your blood pressure should be and when you should contact him or her.  You may need to be on a special diet while taking this medicine. Ask your doctor.  Check with your doctor or health care professional if you get an attack of severe diarrhea, nausea and vomiting, or if you sweat a lot. The loss of too much body fluid can make it dangerous for you to take this medicine.  You may get drowsy or dizzy. Do not drive, use machinery, or do anything that needs mental alertness until you know how this medicine affects you. Do not stand or sit up quickly, especially if you  are an older patient. This reduces the risk of dizzy or fainting spells. Alcohol may interfere with the effect of this medicine. Avoid alcoholic drinks.  This medicine may affect your blood sugar level. If you have diabetes, check with your doctor or health care professional before changing the dose of your diabetic medicine.  This medicine can make you more sensitive to the sun. Keep out of the sun. If you cannot avoid being in the sun, wear protective clothing and use sunscreen. Do not use sun lamps or tanning beds/booths.  What side effects may I notice from receiving this medicine?  Side effects that you should report to your doctor or health care professional as soon as possible:  -allergic reactions such as skin rash or itching, hives, swelling of the lips, mouth, tongue, or throat  -changes in vision  -chest pain  -eye pain  -fast or irregular heartbeat  -feeling faint or lightheaded, falls  -gout attack  -muscle pain or cramps  -pain or difficulty when passing urine  -pain, tingling, numbness in the hands or feet  -redness, blistering, peeling or loosening of the skin, including inside the mouth  -unusually weak or tired  Side effects that usually do not require medical attention (report to your doctor or health care professional if they continue or are bothersome):  -change in sex drive or performance  -dry mouth  -headache  -stomach upset  This list may not describe all possible side effects. Call your doctor for medical advice about side effects. You may report side effects to FDA at 7-875-FDA-6496.  Where should I keep my medicine?  Keep out of the reach of children.  Store at room temperature between 15 and 30 degrees C (59 and 86 degrees F). Do not freeze. Protect from light and moisture. Keep container closed tightly. Throw away any unused medicine after the expiration date.  NOTE: This sheet is a summary. It may not cover all possible information. If you have questions about this medicine, talk to  your doctor, pharmacist, or health care provider.  © 2019 Elsevier/Gold Standard (2011-08-12 12:57:37)    Palpitations  Palpitations are feelings that your heartbeat is irregular or is faster than normal. It may feel like your heart is fluttering or skipping a beat. Palpitations are usually not a serious problem. They may be caused by many things, including smoking, caffeine, alcohol, stress, and certain medicines or drugs. Most causes of palpitations are not serious. However, some palpitations can be a sign of a serious problem. You may need further tests to rule out serious medical problems.  Follow these instructions at home:         Pay attention to any changes in your condition. Take these actions to help manage your symptoms:  Eating and drinking  · Avoid foods and drinks that may cause palpitations. These may include:  ? Caffeinated coffee, tea, soft drinks, diet pills, and energy drinks.  ? Chocolate.  ? Alcohol.  Lifestyle  · Take steps to reduce your stress and anxiety. Things that can help you relax include:  ? Yoga.  ? Mind-body activities, such as deep breathing, meditation, or using words and images to create positive thoughts (guided imagery).  ? Physical activity, such as swimming, jogging, or walking. Tell your health care provider if your palpitations increase with activity. If you have chest pain or shortness of breath with activity, do not continue the activity until you are seen by your health care provider.  ? Biofeedback. This is a method that helps you learn to use your mind to control things in your body, such as your heartbeat.  · Do not use drugs, including cocaine or ecstasy. Do not use marijuana.  · Get plenty of rest and sleep. Keep a regular bed time.  General instructions  · Take over-the-counter and prescription medicines only as told by your health care provider.  · Do not use any products that contain nicotine or tobacco, such as cigarettes and e-cigarettes. If you need help  quitting, ask your health care provider.  · Keep all follow-up visits as told by your health care provider. This is important. These may include visits for further testing if palpitations do not go away or get worse.  Contact a health care provider if you:  · Continue to have a fast or irregular heartbeat after 24 hours.  · Notice that your palpitations occur more often.  Get help right away if you:  · Have chest pain or shortness of breath.  · Have a severe headache.  · Feel dizzy or you faint.  Summary  · Palpitations are feelings that your heartbeat is irregular or is faster than normal. It may feel like your heart is fluttering or skipping a beat.  · Palpitations may be caused by many things, including smoking, caffeine, alcohol, stress, certain medicines, and drugs.  · Although most causes of palpitations are not serious, some causes can be a sign of a serious medical problem.  · Get help right away if you faint or have chest pain, shortness of breath, a severe headache, or dizziness.  This information is not intended to replace advice given to you by your health care provider. Make sure you discuss any questions you have with your health care provider.  Document Released: 12/15/2001 Document Revised: 01/30/2019 Document Reviewed: 01/30/2019  [employer] Interactive Patient Education © 2019 [employer]  Lisinopril tablets  What is this medicine?  LISINOPRIL (lyse IN oh pril) is an ACE inhibitor. This medicine is used to treat high blood pressure and heart failure. It is also used to protect the heart immediately after a heart attack.  This medicine may be used for other purposes; ask your health care provider or pharmacist if you have questions.  COMMON BRAND NAME(S): Prinivil, Zestril  What should I tell my health care provider before I take this medicine?  They need to know if you have any of these conditions:  -diabetes  -heart or blood vessel disease  -kidney disease  -low blood pressure  -previous swelling  of the tongue, face, or lips with difficulty breathing, difficulty swallowing, hoarseness, or tightening of the throat  -an unusual or allergic reaction to lisinopril, other ACE inhibitors, insect venom, foods, dyes, or preservatives  -pregnant or trying to get pregnant  -breast-feeding  How should I use this medicine?  Take this medicine by mouth with a glass of water. Follow the directions on your prescription label. You may take this medicine with or without food. If it upsets your stomach, take it with food. Take your medicine at regular intervals. Do not take it more often than directed. Do not stop taking except on your doctor's advice.  Talk to your pediatrician regarding the use of this medicine in children. Special care may be needed. While this drug may be prescribed for children as young as 6 years of age for selected conditions, precautions do apply.  Overdosage: If you think you have taken too much of this medicine contact a poison control center or emergency room at once.  NOTE: This medicine is only for you. Do not share this medicine with others.  What if I miss a dose?  If you miss a dose, take it as soon as you can. If it is almost time for your next dose, take only that dose. Do not take double or extra doses.  What may interact with this medicine?  Do not take this medicine with any of the following medications:  -hymenoptera venom  -sacubitril; valsartan  This medicines may also interact with the following medications:  -aliskiren  -angiotensin receptor blockers, like losartan or valsartan  -certain medicines for diabetes  -diuretics  -everolimus  -gold compounds  -lithium  -NSAIDs, medicines for pain and inflammation, like ibuprofen or naproxen  -potassium salts or supplements  -salt substitutes  -sirolimus  -temsirolimus  This list may not describe all possible interactions. Give your health care provider a list of all the medicines, herbs, non-prescription drugs, or dietary supplements you  use. Also tell them if you smoke, drink alcohol, or use illegal drugs. Some items may interact with your medicine.  What should I watch for while using this medicine?  Visit your doctor or health care professional for regular check ups. Check your blood pressure as directed. Ask your doctor what your blood pressure should be, and when you should contact him or her.  Do not treat yourself for coughs, colds, or pain while you are using this medicine without asking your doctor or health care professional for advice. Some ingredients may increase your blood pressure.  Women should inform their doctor if they wish to become pregnant or think they might be pregnant. There is a potential for serious side effects to an unborn child. Talk to your health care professional or pharmacist for more information.  Check with your doctor or health care professional if you get an attack of severe diarrhea, nausea and vomiting, or if you sweat a lot. The loss of too much body fluid can make it dangerous for you to take this medicine.  You may get drowsy or dizzy. Do not drive, use machinery, or do anything that needs mental alertness until you know how this drug affects you. Do not stand or sit up quickly, especially if you are an older patient. This reduces the risk of dizzy or fainting spells. Alcohol can make you more drowsy and dizzy. Avoid alcoholic drinks.  Avoid salt substitutes unless you are told otherwise by your doctor or health care professional.  What side effects may I notice from receiving this medicine?  Side effects that you should report to your doctor or health care professional as soon as possible:  -allergic reactions like skin rash, itching or hives, swelling of the hands, feet, face, lips, throat, or tongue  -breathing problems  -signs and symptoms of kidney injury like trouble passing urine or change in the amount of urine  -signs and symptoms of increased potassium like muscle weakness; chest pain; or fast,  irregular heartbeat  -signs and symptoms of liver injury like dark yellow or brown urine; general ill feeling or flu-like symptoms; light-colored stools; loss of appetite; nausea; right upper belly pain; unusually weak or tired; yellowing of the eyes or skin  -signs and symptoms of low blood pressure like dizziness; feeling faint or lightheaded, falls; unusually weak or tired  -stomach pain with or without nausea and vomiting  Side effects that usually do not require medical attention (report to your doctor or health care professional if they continue or are bothersome):  -changes in taste  -cough  -dizziness  -fever  -headache  -sensitivity to light  This list may not describe all possible side effects. Call your doctor for medical advice about side effects. You may report side effects to FDA at 1-417-FDA-1231.  Where should I keep my medicine?  Keep out of the reach of children.  Store at room temperature between 15 and 30 degrees C (59 and 86 degrees F). Protect from moisture. Keep container tightly closed. Throw away any unused medicine after the expiration date.  NOTE: This sheet is a summary. It may not cover all possible information. If you have questions about this medicine, talk to your doctor, pharmacist, or health care provider.  © 2019 [employer]/Gold Standard (2017-02-06 12:52:35)

## 2020-01-22 ENCOUNTER — OFFICE VISIT (OUTPATIENT)
Dept: GYNECOLOGIC ONCOLOGY | Facility: CLINIC | Age: 65
End: 2020-01-22

## 2020-01-22 VITALS
HEIGHT: 63 IN | BODY MASS INDEX: 26.22 KG/M2 | HEART RATE: 100 BPM | RESPIRATION RATE: 24 BRPM | SYSTOLIC BLOOD PRESSURE: 128 MMHG | DIASTOLIC BLOOD PRESSURE: 84 MMHG | WEIGHT: 148 LBS | TEMPERATURE: 97.7 F

## 2020-01-22 DIAGNOSIS — Z01.419 WELL WOMAN EXAM WITH ROUTINE GYNECOLOGICAL EXAM: Primary | ICD-10-CM

## 2020-01-22 PROCEDURE — 99396 PREV VISIT EST AGE 40-64: CPT | Performed by: NURSE PRACTITIONER

## 2020-01-22 NOTE — PROGRESS NOTES
GYN ONCOLOGY ANNUAL WELL WOMAN VISIT      Linn Bean  [id number]  1955      Chief Complaint: Annual Exam (no complaints)        History of present illness:  Linn Bean is a 65 y.o. year old female who is here today for an annual exam. The patient has a history of fibroid uterus, s/p hysterectomy in , as well as a family history of breast and ovarian cancers. She has a personal history of breast and lung cancer, both stage 1 separate primaries, diagnosed in 2018. She is s/p breast lumpectomy followed by radiation and lung lobectomy. HRT was discontinued and she is on Armidex managed by her oncologist at [facility]. She uses Effexor for management of hot flashes, now on 75 mg PO daily. She has regular imaging studies, last CT negative in 2019 and last mammogram in 2019, both negative per her report.     She reports she is feeling very well today and has no complaints. She denies vaginal bleeding, pelvic pain, changes in bowel or bladder function, new or concerning lesions, and breast problems. Mammogram and DEXA are UTD. She is due to repeat colonoscopy now. She is looking forward to her first grandchild this year, due in 2020.         Obstetric History:  OB History        2    Para   2    Term                AB        Living           SAB        TAB        Ectopic        Molar        Multiple        Live Births                   Menstrual History:     No LMP recorded (lmp unknown). Patient has had a hysterectomy.          Past Medical History:   Diagnosis Date   • Adenocarcinoma of lung, stage 1, left (CMS/HCC) 2018    LLL   • DDD (degenerative disc disease), cervical    • Ductal carcinoma in situ (DCIS) of right breast 2018   • LUCILLE (generalized anxiety disorder)    • HLD (hyperlipidemia)    • HTN (hypertension)    • Hypothyroidism    • Osteoarthritis of hands, bilateral        Past Surgical History:   Procedure Laterality Date   • BREAST LUMPECTOMY Left    • BREAST  LUMPECTOMY Right 2018   • BUNIONECTOMY     • COLONOSCOPY  2014   • EYE PTOSIS REPAIR Bilateral 2017   • HYSTERECTOMY  2000   • KNEE ARTHROSCOPY Left 2014   • LUNG LOBECTOMY Left 2018    LLL   • REPLACEMENT TOTAL KNEE Left 2015   • THORACOTOMY Left 2018   • TONSILLECTOMY  1961   • TUBAL ABDOMINAL LIGATION  1993       MEDICATIONS: The current medication list was reviewed and reconciled.     Allergies:  is allergic to codeine.    Family History   Problem Relation Age of Onset   • Asthma Mother    • Ovarian cancer Mother    • Hypothyroidism Mother    • Heart failure Father    • Prostate cancer Father    • Heart attack Father    • Breast cancer Sister        Health Maintenance:  Last mammogram was 7/2019. Last colonoscopy was 2014, with recommended follow-up in 5 year(s). Last DEXA was 2016. Last pap smear was 1/2019, results were  normal PAP.. She  does not have a history of abnormal pap smears.      Review of Systems   Constitutional: Negative for fatigue, fever and unexpected weight change.   HENT: Negative for congestion, ear pain, hearing loss, sinus pressure and trouble swallowing.    Eyes: Negative for visual disturbance.   Respiratory: Negative for cough, chest tightness, shortness of breath and wheezing.    Cardiovascular: Negative for chest pain, palpitations and leg swelling.   Gastrointestinal: Negative for abdominal distention, abdominal pain, constipation, diarrhea, nausea and vomiting.   Endocrine: Negative for cold intolerance, heat intolerance, polydipsia, polyphagia and polyuria.   Genitourinary: Negative for difficulty urinating, dyspareunia, dysuria, frequency, hematuria, pelvic pain, urgency, vaginal bleeding, vaginal discharge and vaginal pain.   Musculoskeletal: Negative for arthralgias, gait problem, joint swelling and myalgias.   Skin: Negative for color change, pallor and rash.   Neurological: Negative for dizziness, seizures, syncope, weakness, light-headedness, numbness and headaches.  "  Hematological: Negative for adenopathy. Does not bruise/bleed easily.   Psychiatric/Behavioral: Negative for agitation, confusion, sleep disturbance and suicidal ideas. The patient is not nervous/anxious.        Physical Exam  Vital Signs: /84   Pulse 100   Temp 97.7 °F (36.5 °C) (Temporal)   Resp 24   Ht 160 cm (63\")   Wt 67.1 kg (148 lb)   LMP  (LMP Unknown)   BMI 26.22 kg/m²   Pain Score    01/22/20 0832   PainSc: 0-No pain      General Appearance:  alert, cooperative, no apparent distress and appears stated age   Neurologic/Psychiatric: A&O x 3, gait steady, appropriate affect   HEENT:  Normocephalic, without obvious abnormality, mucous membranes moist   Neck: Supple, symmetrical, trachea midline, no adenopathy;  No thyromegaly, masses, or tenderness   Back:   Symmetric, no curvature, ROM normal, no CVA tenderness   Lungs:   Clear to auscultation bilaterally; respirations regular, even, and unlabored bilaterally   Heart:  Regular rate and rhythm, no murmurs appreciated   Breasts:  Symmetrical, no masses, no lesions and no nipple discharge. Right breast scarring consistent with history of lumpectomy   Abdomen:   Soft, non-tender, non-distended and no organomegaly   Lymph nodes: No cervical, supraclavicular, inguinal or axillary adenopathy noted   Extremities: Normal, atraumatic; no clubbing, cyanosis, or edema    Skin: No rashes, ulcers, or suspicious lesions noted   Pelvic: External Genitalia  without lesions or skin changes  Vagina  is pink, moist, without lesions.   Vaginal Cuff  Female Vaginal Cuff: smooth, intact, without visible lesions and pap obtained  Uterus  surgically absent  Ovaries  surgically absent bilaterally  Parametria  smooth  Rectovaginal  Female rectovaginal: confirms no masses or bleeding and Hemoccult negative     ECOG Performance Status: 0 - Asymptomatic    Procedure Note:  No notes on file    Assessment and Plan:    Linn was seen today for annual exam.    Diagnoses and all " orders for this visit:    Well woman exam with routine gynecological exam        No pap smear needed today.     She was encouraged to get yearly mammograms.  She should report any palpable breast lump(s) or skin changes regardless of mammographic findings.  I explained to Linn that notification regarding her mammogram results will come from the center performing the study.  Our office will not be routinely calling with mammogram results.  It is her responsibility to make sure that the results from the mammogram are communicated to her by the breast center.  If she has any questions about the results, she is welcome to call our office anytime.    Repeat colonoscopy due now, call to schedule.     Repeat DEXA at age 65 or sooner if instructed by oncologist. Continue Vitamin D and Calcium supplementation while on Arimidex.     Continue Arimidex and Effexor as prescribed by oncologist and keep regular follow-ups.       Return to clinic in 1 year for Annual exam.      DAYAMI Mullins

## 2020-01-24 ENCOUNTER — TELEPHONE (OUTPATIENT)
Dept: FAMILY MEDICINE CLINIC | Facility: CLINIC | Age: 65
End: 2020-01-24

## 2020-01-24 DIAGNOSIS — I10 ESSENTIAL HYPERTENSION: ICD-10-CM

## 2020-01-24 RX ORDER — HYDROCHLOROTHIAZIDE 12.5 MG/1
12.5 CAPSULE, GELATIN COATED ORAL DAILY
Qty: 7 CAPSULE | Refills: 0 | Status: SHIPPED | OUTPATIENT
Start: 2020-01-24 | End: 2020-01-29 | Stop reason: DRUGHIGH

## 2020-01-24 NOTE — TELEPHONE ENCOUNTER
Patient called in stating she is needing a refill on   hydroCHLOROthiazide (MICROZIDE) 12.5 MG varpwug65.5 mg, Daily  Patient taking differently: 25 mg Oral Daily, Reported on 12/27/2019. Patient is out of the medication. [facility] pharmacy confirmed, please advise [phone]

## 2020-01-29 ENCOUNTER — OFFICE VISIT (OUTPATIENT)
Dept: FAMILY MEDICINE CLINIC | Facility: CLINIC | Age: 65
End: 2020-01-29

## 2020-01-29 VITALS
DIASTOLIC BLOOD PRESSURE: 84 MMHG | SYSTOLIC BLOOD PRESSURE: 142 MMHG | HEART RATE: 86 BPM | OXYGEN SATURATION: 99 % | WEIGHT: 148 LBS | RESPIRATION RATE: 20 BRPM | HEIGHT: 63 IN | TEMPERATURE: 98.2 F | BODY MASS INDEX: 26.22 KG/M2

## 2020-01-29 DIAGNOSIS — T46.4X5A COUGH DUE TO ACE INHIBITOR: Primary | ICD-10-CM

## 2020-01-29 DIAGNOSIS — H69.92 EUSTACHIAN TUBE DISORDER, LEFT: ICD-10-CM

## 2020-01-29 DIAGNOSIS — I10 ESSENTIAL HYPERTENSION: ICD-10-CM

## 2020-01-29 DIAGNOSIS — R05.8 COUGH DUE TO ACE INHIBITOR: Primary | ICD-10-CM

## 2020-01-29 PROCEDURE — 99214 OFFICE O/P EST MOD 30 MIN: CPT | Performed by: NURSE PRACTITIONER

## 2020-01-29 RX ORDER — LOSARTAN POTASSIUM 25 MG/1
25 TABLET ORAL DAILY
Qty: 90 TABLET | Refills: 1 | Status: SHIPPED | OUTPATIENT
Start: 2020-01-29 | End: 2020-03-26 | Stop reason: DRUGHIGH

## 2020-01-29 RX ORDER — HYDROCHLOROTHIAZIDE 25 MG/1
25 TABLET ORAL DAILY
Qty: 90 TABLET | Refills: 1 | Status: SHIPPED | OUTPATIENT
Start: 2020-01-29 | End: 2020-05-26 | Stop reason: DRUGHIGH

## 2020-01-29 RX ORDER — VENLAFAXINE HYDROCHLORIDE 150 MG/1
150 CAPSULE, EXTENDED RELEASE ORAL DAILY
COMMUNITY
End: 2020-10-30 | Stop reason: SDUPTHER

## 2020-01-29 NOTE — PROGRESS NOTES
Subjective   Linn Bean is a 65 y.o. female.     History of Present Illness 65 year old female with history of breast cancer, lung cancer managed by [facility] with htn. Recently started on Lisinopril and reports dry hacking cough since then. No sob, wheezing, or chest pain. No fever. Sleeping in a recliner. Complains of ear fullness today. Synthroid managed by Endo. Has history of thyroid nodule which they follow with US every few years.    Outpatient Encounter Medications as of 1/29/2020   Medication Sig Dispense Refill   • atorvastatin (LIPITOR) 20 MG tablet Take 1 tablet by mouth Daily. 90 tablet 3   • calcium carbonate (OS-SAAD) 600 MG tablet Take 600 mg by mouth Daily.     • letrozole (FEMARA) 2.5 MG tablet      • omeprazole (priLOSEC) 20 MG capsule Take 1 capsule by mouth Daily. 30 capsule 5   • SYNTHROID 125 MCG tablet Take 1 tablet by mouth Daily. 90 tablet 3   • venlafaxine XR (EFFEXOR-XR) 150 MG 24 hr capsule Take 150 mg by mouth Daily.     • venlafaxine XR (EFFEXOR-XR) 75 MG 24 hr capsule Take 1 capsule by mouth Daily. (Patient taking differently: Take 150 mg by mouth Daily.) 90 capsule 3   • Vitamin D, Cholecalciferol, (CHOLECALCIFEROL) 10 MCG (400 UNIT) tablet Take 800 Units by mouth Daily.     • [DISCONTINUED] hydroCHLOROthiazide (MICROZIDE) 12.5 MG capsule Take 1 capsule by mouth Daily. 7 capsule 0   • [DISCONTINUED] lisinopril (PRINIVIL,ZESTRIL) 10 MG tablet Take 1 tablet by mouth Daily. 30 tablet 1   • hydroCHLOROthiazide (HYDRODIURIL) 25 MG tablet Take 1 tablet by mouth Daily. 90 tablet 1   • losartan (COZAAR) 25 MG tablet Take 1 tablet by mouth Daily. 90 tablet 1     No facility-administered encounter medications on file as of 1/29/2020.        The following portions of the patient's history were reviewed and updated as appropriate: allergies, current medications, past family history, past medical history, past social history, past surgical history and problem list.    Review of Systems  "  Constitutional: Negative for appetite change, fever, unexpected weight gain and unexpected weight loss.   HENT: Negative for congestion, nosebleeds, sore throat and trouble swallowing.    Eyes: Negative for visual disturbance.   Respiratory: Positive for cough. Negative for shortness of breath and wheezing.    Cardiovascular: Negative for chest pain, palpitations and leg swelling.   Gastrointestinal: Negative for abdominal pain, blood in stool, constipation, diarrhea, nausea and vomiting.   Endocrine: Negative for polydipsia, polyphagia and polyuria.   Genitourinary: Negative for dysuria, frequency and hematuria.   Musculoskeletal: Negative for arthralgias, joint swelling and myalgias.   Skin: Negative for rash.   Neurological: Negative for dizziness, seizures, syncope and numbness.   Hematological: Negative for adenopathy. Does not bruise/bleed easily.   Psychiatric/Behavioral: Negative for behavioral problems, sleep disturbance and depressed mood. The patient is not nervous/anxious.        Objective     Visit Vitals  /84   Pulse 86   Temp 98.2 °F (36.8 °C) (Temporal)   Resp 20   Ht 160 cm (63\")   Wt 67.1 kg (148 lb)   LMP  (LMP Unknown)   SpO2 99%   BMI 26.22 kg/m²       Physical Exam   Constitutional: She is oriented to person, place, and time. She appears well-developed and well-nourished. No distress.   HENT:   Head: Normocephalic and atraumatic.   Right Ear: Tympanic membrane and external ear normal.   Left Ear: External ear normal. Tympanic membrane is retracted.   Nose: Nose normal.   Mouth/Throat: Oropharynx is clear and moist. No oropharyngeal exudate.   Eyes: Pupils are equal, round, and reactive to light. Conjunctivae are normal. Right eye exhibits no discharge. Left eye exhibits no discharge. No scleral icterus.   Neck: Neck supple. No tracheal deviation present. No thyromegaly present.   Cardiovascular: Normal rate, regular rhythm and normal heart sounds. Exam reveals no gallop and no friction " rub.   No murmur heard.  Pulmonary/Chest: Effort normal and breath sounds normal. No respiratory distress. She has no wheezes.   Abdominal: Soft. Bowel sounds are normal. She exhibits no distension and no mass. There is no tenderness.   Musculoskeletal: She exhibits no edema or deformity.   Lymphadenopathy:     She has no cervical adenopathy.   Neurological: She is alert and oriented to person, place, and time. Coordination normal.   Skin: Skin is warm and dry. Capillary refill takes less than 2 seconds. No rash noted. No erythema.   Psychiatric: She has a normal mood and affect. Her speech is normal and behavior is normal. Judgment and thought content normal.   Nursing note and vitals reviewed.        Assessment/Plan   Linn was seen today for hypertension, cough and hypothyroidism.    Diagnoses and all orders for this visit:    Cough due to ACE inhibitor    Essential hypertension  -     losartan (COZAAR) 25 MG tablet; Take 1 tablet by mouth Daily.  -     hydroCHLOROthiazide (HYDRODIURIL) 25 MG tablet; Take 1 tablet by mouth Daily.    Eustachian tube disorder, left      Stop Lisinoril. Start Losartan. Discussed previous recall.  Monitor bp at home. Discussed goal 130/80.  Mucinex OTC for ears.  Continue to follow with Endo for hypothyroidism.  Monitor bp at home and bring log. Encourage DASH diet and 150 minutes of weekly exercise. Reviewed signs and symptoms of MI and stroke. If symptoms persist or worsen go to the ER.    Discussed the nature of the disease including, risks, complications, implications, management, safe and proper use of medications. Encouraged therapeutic lifestyle changes including low calorie diet with plenty of fruits and vegetables, daily exercise, medication compliance, and keeping scheduled follow up appointments with me and any other providers. Encouraged patient to have appointment for complete physical, fasting labs, appropriate screenings, and immunizations on an annual basis.  Follow up  as needed.

## 2020-01-31 ENCOUNTER — OFFICE VISIT (OUTPATIENT)
Dept: FAMILY MEDICINE CLINIC | Facility: CLINIC | Age: 65
End: 2020-01-31

## 2020-01-31 VITALS
HEIGHT: 63 IN | BODY MASS INDEX: 25.69 KG/M2 | WEIGHT: 145 LBS | TEMPERATURE: 98.8 F | HEART RATE: 102 BPM | SYSTOLIC BLOOD PRESSURE: 122 MMHG | RESPIRATION RATE: 16 BRPM | DIASTOLIC BLOOD PRESSURE: 78 MMHG | OXYGEN SATURATION: 98 %

## 2020-01-31 DIAGNOSIS — J01.10 ACUTE FRONTAL SINUSITIS, RECURRENCE NOT SPECIFIED: Primary | ICD-10-CM

## 2020-01-31 PROCEDURE — 99213 OFFICE O/P EST LOW 20 MIN: CPT | Performed by: FAMILY MEDICINE

## 2020-01-31 RX ORDER — CEFUROXIME AXETIL 250 MG/1
250 TABLET ORAL 2 TIMES DAILY
Qty: 20 TABLET | Refills: 0 | Status: SHIPPED | OUTPATIENT
Start: 2020-01-31 | End: 2020-02-12

## 2020-01-31 RX ORDER — PREDNISONE 10 MG/1
TABLET ORAL
Qty: 10 TABLET | Refills: 0 | Status: SHIPPED | OUTPATIENT
Start: 2020-01-31 | End: 2020-02-06

## 2020-02-06 ENCOUNTER — OFFICE VISIT (OUTPATIENT)
Dept: FAMILY MEDICINE CLINIC | Facility: CLINIC | Age: 65
End: 2020-02-06

## 2020-02-06 ENCOUNTER — HOSPITAL ENCOUNTER (OUTPATIENT)
Dept: GENERAL RADIOLOGY | Facility: HOSPITAL | Age: 65
Discharge: HOME | End: 2020-02-06
Admitting: NURSE PRACTITIONER

## 2020-02-06 VITALS
OXYGEN SATURATION: 96 % | BODY MASS INDEX: 27.29 KG/M2 | HEART RATE: 69 BPM | TEMPERATURE: 97.3 F | RESPIRATION RATE: 16 BRPM | DIASTOLIC BLOOD PRESSURE: 84 MMHG | HEIGHT: 63 IN | WEIGHT: 154 LBS | SYSTOLIC BLOOD PRESSURE: 128 MMHG

## 2020-02-06 DIAGNOSIS — R05.3 COUGH, PERSISTENT: ICD-10-CM

## 2020-02-06 DIAGNOSIS — C34.92 ADENOCARCINOMA OF LUNG, STAGE 1, LEFT (HCC): Primary | ICD-10-CM

## 2020-02-06 DIAGNOSIS — C34.92 ADENOCARCINOMA OF LUNG, STAGE 1, LEFT (HCC): ICD-10-CM

## 2020-02-06 DIAGNOSIS — R05.3 PERSISTENT COUGH: Primary | ICD-10-CM

## 2020-02-06 PROCEDURE — 85025 COMPLETE CBC W/AUTO DIFF WBC: CPT | Performed by: NURSE PRACTITIONER

## 2020-02-06 PROCEDURE — 99214 OFFICE O/P EST MOD 30 MIN: CPT | Performed by: NURSE PRACTITIONER

## 2020-02-06 PROCEDURE — 80053 COMPREHEN METABOLIC PANEL: CPT | Performed by: NURSE PRACTITIONER

## 2020-02-06 PROCEDURE — 71046 X-RAY EXAM CHEST 2 VIEWS: CPT

## 2020-02-06 RX ORDER — DEXTROMETHORPHAN HYDROBROMIDE AND PROMETHAZINE HYDROCHLORIDE 15; 6.25 MG/5ML; MG/5ML
5 SYRUP ORAL 4 TIMES DAILY PRN
Qty: 240 ML | Refills: 0 | Status: SHIPPED | OUTPATIENT
Start: 2020-02-06 | End: 2020-02-12

## 2020-02-06 RX ORDER — AZITHROMYCIN 250 MG/1
TABLET, FILM COATED ORAL
Qty: 6 TABLET | Refills: 0 | Status: SHIPPED | OUTPATIENT
Start: 2020-02-06 | End: 2020-02-12

## 2020-02-06 RX ORDER — BENZONATATE 200 MG/1
200 CAPSULE ORAL 3 TIMES DAILY PRN
Qty: 60 CAPSULE | Refills: 0 | Status: SHIPPED | OUTPATIENT
Start: 2020-02-06 | End: 2021-04-19

## 2020-02-06 NOTE — PROGRESS NOTES
Subjective   Linn Bean is a 65 y.o. female.     History of Present Illness Linn is here to follow up on persistent and worsening cough. Diagnosed with left lung adenocarcinoma treated by Dr Koo at [facility]. Last CT 1/8/19 neg. Pet scan 5/19 negative. Over the last few weeks she has complained of persistent dry, hacking cough. Denied GERD symptoms. Denies URI symptoms. Had been started on Ace and it was discontinued and started on Losartan. In the last week cough has progressed and constant day and night. Talking will cause a coughing fit. Now with green sputum. Denies fever. Denies wheezing or increase sob. She has increased fatigue. Seen by Dr Tellez 5 days ago and treated with steroids and  Ceftin. Symptoms have worsened. She has been sleeping in a chair for over a month.  Denies chest pain.  NO known sick exposures.    Outpatient Encounter Medications as of 2/6/2020   Medication Sig Dispense Refill   • atorvastatin (LIPITOR) 20 MG tablet Take 1 tablet by mouth Daily. 90 tablet 3   • calcium carbonate (OS-SAAD) 600 MG tablet Take 600 mg by mouth Daily.     • cefuroxime (CEFTIN) 250 MG tablet Take 1 tablet by mouth 2 (Two) Times a Day. 20 tablet 0   • hydroCHLOROthiazide (HYDRODIURIL) 25 MG tablet Take 1 tablet by mouth Daily. 90 tablet 1   • letrozole (FEMARA) 2.5 MG tablet      • losartan (COZAAR) 25 MG tablet Take 1 tablet by mouth Daily. 90 tablet 1   • omeprazole (priLOSEC) 20 MG capsule Take 1 capsule by mouth Daily. 30 capsule 5   • SYNTHROID 125 MCG tablet Take 1 tablet by mouth Daily. 90 tablet 3   • venlafaxine XR (EFFEXOR-XR) 150 MG 24 hr capsule Take 150 mg by mouth Daily.     • Vitamin D, Cholecalciferol, (CHOLECALCIFEROL) 10 MCG (400 UNIT) tablet Take 800 Units by mouth Daily.     • azithromycin (ZITHROMAX) 250 MG tablet Take 2 tablets the first day, then 1 tablet daily for 4 days. 6 tablet 0   • benzonatate (TESSALON) 200 MG capsule Take 1 capsule by mouth 3 (Three) Times a Day As Needed for  "Cough. 60 capsule 0   • promethazine-dextromethorphan (PROMETHAZINE-DM) 6.25-15 MG/5ML syrup Take 5 mL by mouth 4 (Four) Times a Day As Needed for Cough. 240 mL 0   • [DISCONTINUED] predniSONE (DELTASONE) 10 MG tablet Take 4 daily x 1 day, 3 daily x 1 day , 2 daily x 1 day, then 1 daily x 1 day. 10 tablet 0     No facility-administered encounter medications on file as of 2/6/2020.        The following portions of the patient's history were reviewed and updated as appropriate: allergies, current medications, past family history, past medical history, past social history, past surgical history and problem list.    Review of Systems   Constitutional: Negative for appetite change, fever, unexpected weight gain and unexpected weight loss.   HENT: Negative for congestion, nosebleeds, sore throat and trouble swallowing.    Eyes: Negative for visual disturbance.   Respiratory: Positive for cough. Negative for shortness of breath and wheezing.         No hemoptysis   Cardiovascular: Negative for chest pain, palpitations and leg swelling.   Gastrointestinal: Negative for abdominal pain, blood in stool, constipation, diarrhea, nausea and vomiting.   Endocrine: Negative for polydipsia, polyphagia and polyuria.   Genitourinary: Negative for dysuria, frequency and hematuria.   Musculoskeletal: Negative for arthralgias, joint swelling and myalgias.   Skin: Negative for rash.   Neurological: Negative for dizziness, seizures, syncope and numbness.   Hematological: Negative for adenopathy. Does not bruise/bleed easily.   Psychiatric/Behavioral: Negative for behavioral problems, sleep disturbance and depressed mood. The patient is not nervous/anxious.        Objective     Visit Vitals  /84 (BP Location: Left arm, Patient Position: Sitting, Cuff Size: Adult)   Pulse 69   Temp 97.3 °F (36.3 °C) (Temporal)   Resp 16   Ht 160 cm (63\")   Wt 69.9 kg (154 lb)   LMP  (LMP Unknown)   SpO2 96%   BMI 27.28 kg/m²       Physical Exam "   Constitutional: She is oriented to person, place, and time. She appears well-developed and well-nourished. No distress.   HENT:   Head: Normocephalic and atraumatic.   Right Ear: Tympanic membrane and external ear normal.   Left Ear: Tympanic membrane and external ear normal.   Nose: Nose normal.   Mouth/Throat: Oropharynx is clear and moist. No oropharyngeal exudate.   Voice is hoarse   Eyes: Pupils are equal, round, and reactive to light. Conjunctivae are normal. Right eye exhibits no discharge. Left eye exhibits no discharge. No scleral icterus.   Neck: Neck supple. No tracheal deviation present. No thyromegaly present.   Cardiovascular: Normal rate, regular rhythm and normal heart sounds. Exam reveals no gallop and no friction rub.   No murmur heard.  Pulmonary/Chest: Effort normal and breath sounds normal. No respiratory distress. She has no wheezes.   Abdominal: Soft. Bowel sounds are normal. She exhibits no distension and no mass. There is no tenderness.   Musculoskeletal: She exhibits no edema or deformity.   Lymphadenopathy:     She has no cervical adenopathy.   Neurological: She is alert and oriented to person, place, and time. Coordination normal.   Skin: Skin is warm and dry. Capillary refill takes less than 2 seconds. No rash noted. No erythema.   Psychiatric: She has a normal mood and affect. Her speech is normal and behavior is normal. Judgment and thought content normal.   Nursing note and vitals reviewed.        Assessment/Plan   Linn was seen today for cough and sinus problem.    Diagnoses and all orders for this visit:    Persistent cough  -     azithromycin (ZITHROMAX) 250 MG tablet; Take 2 tablets the first day, then 1 tablet daily for 4 days.  -     promethazine-dextromethorphan (PROMETHAZINE-DM) 6.25-15 MG/5ML syrup; Take 5 mL by mouth 4 (Four) Times a Day As Needed for Cough.  -     benzonatate (TESSALON) 200 MG capsule; Take 1 capsule by mouth 3 (Three) Times a Day As Needed for Cough.  -      Ambulatory Referral to Pulmonology  -     CT Chest With & Without Contrast; Future  -     CBC & Differential  -     Comprehensive Metabolic Panel  -     CBC Auto Differential    Adenocarcinoma of lung, stage 1, left (CMS/HCC)  -     Ambulatory Referral to Pulmonology  -     CT Chest With & Without Contrast; Future    Peer to Peer [id number].  Will have her use Tessalon and Promethazine.  Oral steroids did not help and she is not wheezing so I don't think albuterol or inhaled steroids will help.  Cover with macrolide.  Refer to Pulm  Obtain CT.  Follow up with results.

## 2020-02-07 ENCOUNTER — HOSPITAL ENCOUNTER (OUTPATIENT)
Dept: CT IMAGING | Facility: HOSPITAL | Age: 65
Discharge: HOME | End: 2020-02-07
Admitting: NURSE PRACTITIONER

## 2020-02-07 DIAGNOSIS — C34.92 ADENOCARCINOMA OF LUNG, STAGE 1, LEFT (HCC): ICD-10-CM

## 2020-02-07 DIAGNOSIS — R05.3 PERSISTENT COUGH: ICD-10-CM

## 2020-02-07 LAB
ALBUMIN SERPL-MCNC: 4.3 G/DL (ref 3.5–5.2)
ALBUMIN/GLOB SERPL: 1.5 G/DL
ALP SERPL-CCNC: 122 U/L (ref 39–117)
ALT SERPL W P-5'-P-CCNC: 57 U/L (ref 1–33)
ANION GAP SERPL CALCULATED.3IONS-SCNC: 11.4 MMOL/L (ref 5–15)
AST SERPL-CCNC: 21 U/L (ref 1–32)
BASOPHILS # BLD AUTO: 0.06 10*3/MM3 (ref 0–0.2)
BASOPHILS NFR BLD AUTO: 0.7 % (ref 0–1.5)
BILIRUB SERPL-MCNC: 0.2 MG/DL (ref 0.2–1.2)
BUN BLD-MCNC: 13 MG/DL (ref 8–23)
BUN/CREAT SERPL: 23.6 (ref 7–25)
CALCIUM SPEC-SCNC: 9.7 MG/DL (ref 8.6–10.5)
CHLORIDE SERPL-SCNC: 96 MMOL/L (ref 98–107)
CO2 SERPL-SCNC: 26.6 MMOL/L (ref 22–29)
CREAT BLD-MCNC: 0.55 MG/DL (ref 0.57–1)
DEPRECATED RDW RBC AUTO: 39.2 FL (ref 37–54)
EOSINOPHIL # BLD AUTO: 0.26 10*3/MM3 (ref 0–0.4)
EOSINOPHIL NFR BLD AUTO: 3.1 % (ref 0.3–6.2)
ERYTHROCYTE [DISTWIDTH] IN BLOOD BY AUTOMATED COUNT: 11.7 % (ref 12.3–15.4)
GFR SERPL CREATININE-BSD FRML MDRD: 112 ML/MIN/1.73
GLOBULIN UR ELPH-MCNC: 2.8 GM/DL
GLUCOSE BLD-MCNC: 88 MG/DL (ref 65–99)
HCT VFR BLD AUTO: 39.6 % (ref 34–46.6)
HGB BLD-MCNC: 13.7 G/DL (ref 12–15.9)
IMM GRANULOCYTES # BLD AUTO: 0.11 10*3/MM3 (ref 0–0.05)
IMM GRANULOCYTES NFR BLD AUTO: 1.3 % (ref 0–0.5)
LYMPHOCYTES # BLD AUTO: 1.65 10*3/MM3 (ref 0.7–3.1)
LYMPHOCYTES NFR BLD AUTO: 19.7 % (ref 19.6–45.3)
MCH RBC QN AUTO: 32.2 PG (ref 26.6–33)
MCHC RBC AUTO-ENTMCNC: 34.6 G/DL (ref 31.5–35.7)
MCV RBC AUTO: 93 FL (ref 79–97)
MONOCYTES # BLD AUTO: 0.99 10*3/MM3 (ref 0.1–0.9)
MONOCYTES NFR BLD AUTO: 11.8 % (ref 5–12)
NEUTROPHILS # BLD AUTO: 5.31 10*3/MM3 (ref 1.7–7)
NEUTROPHILS NFR BLD AUTO: 63.4 % (ref 42.7–76)
NRBC BLD AUTO-RTO: 0 /100 WBC (ref 0–0.2)
PLATELET # BLD AUTO: 281 10*3/MM3 (ref 140–450)
PMV BLD AUTO: 10.3 FL (ref 6–12)
POTASSIUM BLD-SCNC: 4.3 MMOL/L (ref 3.5–5.2)
PROT SERPL-MCNC: 7.1 G/DL (ref 6–8.5)
RBC # BLD AUTO: 4.26 10*6/MM3 (ref 3.77–5.28)
SODIUM BLD-SCNC: 134 MMOL/L (ref 136–145)
WBC NRBC COR # BLD: 8.38 10*3/MM3 (ref 3.4–10.8)

## 2020-02-07 PROCEDURE — 71270 CT THORAX DX C-/C+: CPT

## 2020-02-07 PROCEDURE — 25010000002 IOPAMIDOL 61 % SOLUTION: Performed by: NURSE PRACTITIONER

## 2020-02-07 RX ADMIN — IOPAMIDOL 75 ML: 612 INJECTION, SOLUTION INTRAVENOUS at 12:30

## 2020-02-12 ENCOUNTER — OFFICE VISIT (OUTPATIENT)
Dept: PULMONOLOGY | Facility: CLINIC | Age: 65
End: 2020-02-12

## 2020-02-12 VITALS
OXYGEN SATURATION: 100 % | DIASTOLIC BLOOD PRESSURE: 78 MMHG | HEART RATE: 102 BPM | TEMPERATURE: 98.1 F | WEIGHT: 151.6 LBS | SYSTOLIC BLOOD PRESSURE: 120 MMHG | BODY MASS INDEX: 26.86 KG/M2 | HEIGHT: 63 IN

## 2020-02-12 DIAGNOSIS — J01.11 ACUTE RECURRENT FRONTAL SINUSITIS: ICD-10-CM

## 2020-02-12 DIAGNOSIS — C34.92 ADENOCARCINOMA OF LUNG, STAGE 1, LEFT (HCC): ICD-10-CM

## 2020-02-12 DIAGNOSIS — R05.9 COUGH: Primary | ICD-10-CM

## 2020-02-12 DIAGNOSIS — I10 ESSENTIAL HYPERTENSION: ICD-10-CM

## 2020-02-12 PROCEDURE — 99204 OFFICE O/P NEW MOD 45 MIN: CPT | Performed by: INTERNAL MEDICINE

## 2020-02-12 PROCEDURE — 94729 DIFFUSING CAPACITY: CPT | Performed by: INTERNAL MEDICINE

## 2020-02-12 PROCEDURE — 94726 PLETHYSMOGRAPHY LUNG VOLUMES: CPT | Performed by: INTERNAL MEDICINE

## 2020-02-12 PROCEDURE — 94375 RESPIRATORY FLOW VOLUME LOOP: CPT | Performed by: INTERNAL MEDICINE

## 2020-02-12 RX ORDER — AMOXICILLIN AND CLAVULANATE POTASSIUM 875; 125 MG/1; MG/1
1 TABLET, FILM COATED ORAL 2 TIMES DAILY
Qty: 20 TABLET | Refills: 0 | Status: SHIPPED | OUTPATIENT
Start: 2020-02-12 | End: 2020-02-22

## 2020-02-12 NOTE — PROGRESS NOTES
New Patient Pulmonary Office Visit      Patient Name: Linn Bean    Referring Physician: Anthony Jang D*    Chief Complaint: Cough    History of Present Illness: Linn Bean is a 65 y.o. female who is here today to establish care with Pulmonary.  Patient has a past medical history significant for stage I left adenocarcinoma status post resection, ductal carcinoma in situ of the right breast, general anxiety disorder, hyperlipidemia, hypertension, and osteoarthritis.  He has been presenting to [facility] pulmonary for evaluation of a cough.    The patient presents for the complaint of a cough. The patient states the cough began 2 months ago. The patients describes the cough as non-productive, without wheezing, dyspnea or hemoptysis, initially she notes that she was on lisinopril and the cough was dry in nature, she stopped that 2 weeks ago but was having a sinus infection and after that developed a more productive cough. The cough is exacerbated by laying down at night. The cough is alleviated by azithromycin. The pt describes difficulty with sleep as associated symptoms.  She states that initially this began around Eva time and was a dry cough after she started lisinopril, she stopped lisinopril 2 weeks ago was placed on losartan at which time she was also developing a sinus infection was treated with azithromycin.  At that point time she feels that there is some improvement in the cough after the azithromycin.  But she is continued to have over the last 2 weeks since that time.  Her blood pressure is been well controlled on her current regimen of hydrochlorothiazide and losartan.  She was also tried on albuterol inhaler which felt like it caused her to have jittering and did not feel that helped the cough.  In addition to this she was started on Tessalon Perles which have provided minimal relief, and she was also given Promethazine DM as she is allergic to codeine.  So far she has had minimal  relief with this.      Review of Systems:   Review of Systems   Constitutional: Positive for fatigue. Negative for activity change, appetite change, chills and diaphoresis.   HENT: Positive for congestion, ear pain, postnasal drip, rhinorrhea, sinus pressure, sneezing, trouble swallowing and voice change.    Eyes: Negative for blurred vision.   Respiratory: Positive for cough. Negative for shortness of breath and wheezing.    Cardiovascular: Negative for chest pain.   Gastrointestinal: Negative for abdominal pain.   Musculoskeletal: Positive for arthralgias, joint swelling, neck pain and neck stiffness. Negative for myalgias.   Skin: Negative for color change and dry skin.   Allergic/Immunologic: Negative for environmental allergies.   Neurological: Negative for weakness and confusion.   Hematological: Negative for adenopathy.   Psychiatric/Behavioral: Positive for sleep disturbance. Negative for depressed mood. The patient is nervous/anxious.        Past Medical History:   Past Medical History:   Diagnosis Date   • Adenocarcinoma of lung, stage 1, left (CMS/HCC) 2018    LLL   • DDD (degenerative disc disease), cervical    • Ductal carcinoma in situ (DCIS) of right breast 2018   • LUCILLE (generalized anxiety disorder)    • HLD (hyperlipidemia)    • HTN (hypertension)    • Hypothyroidism    • Osteoarthritis of hands, bilateral        Past Surgical History:   Past Surgical History:   Procedure Laterality Date   • BREAST LUMPECTOMY Left 1998   • BREAST LUMPECTOMY Right 2018   • BUNIONECTOMY     • COLONOSCOPY  2014   • EYE PTOSIS REPAIR Bilateral 2017   • HYSTERECTOMY  2000   • KNEE ARTHROSCOPY Left 2014   • LUNG LOBECTOMY Left 2018    LLL   • REPLACEMENT TOTAL KNEE Left 2015   • THORACOTOMY Left 2018   • TONSILLECTOMY  1961   • TUBAL ABDOMINAL LIGATION  1993       Family History:   Family History   Problem Relation Age of Onset   • Asthma Mother    • Ovarian cancer Mother    • Hypothyroidism Mother    • Heart failure  Father    • Prostate cancer Father    • Heart attack Father    • Breast cancer Sister        Social History:   Social History     Socioeconomic History   • Marital status:      Spouse name: Mahesh   • Number of children: 2   • Years of education: College   • Highest education level: Not on file   Occupational History   • Occupation: HR     Employer: [employer]   Tobacco Use   • Smoking status: Never Smoker   • Smokeless tobacco: Never Used   Substance and Sexual Activity   • Alcohol use: Yes     Alcohol/week: 5.0 standard drinks     Types: 5 Glasses of wine per week   • Drug use: No   • Sexual activity: Yes     Partners: Male       Medications:     Current Outpatient Medications:   •  atorvastatin (LIPITOR) 20 MG tablet, Take 1 tablet by mouth Daily., Disp: 90 tablet, Rfl: 3  •  benzonatate (TESSALON) 200 MG capsule, Take 1 capsule by mouth 3 (Three) Times a Day As Needed for Cough., Disp: 60 capsule, Rfl: 0  •  calcium carbonate (OS-SAAD) 600 MG tablet, Take 600 mg by mouth Daily., Disp: , Rfl:   •  hydroCHLOROthiazide (HYDRODIURIL) 25 MG tablet, Take 1 tablet by mouth Daily., Disp: 90 tablet, Rfl: 1  •  letrozole (FEMARA) 2.5 MG tablet, , Disp: , Rfl:   •  losartan (COZAAR) 25 MG tablet, Take 1 tablet by mouth Daily., Disp: 90 tablet, Rfl: 1  •  omeprazole (priLOSEC) 20 MG capsule, Take 1 capsule by mouth Daily., Disp: 30 capsule, Rfl: 5  •  SYNTHROID 125 MCG tablet, Take 1 tablet by mouth Daily., Disp: 90 tablet, Rfl: 3  •  venlafaxine XR (EFFEXOR-XR) 150 MG 24 hr capsule, Take 150 mg by mouth Daily., Disp: , Rfl:   •  Vitamin D, Cholecalciferol, (CHOLECALCIFEROL) 10 MCG (400 UNIT) tablet, Take 800 Units by mouth Daily., Disp: , Rfl:   •  amoxicillin-clavulanate (AUGMENTIN) 875-125 MG per tablet, Take 1 tablet by mouth 2 (Two) Times a Day for 10 days., Disp: 20 tablet, Rfl: 0    Allergies:   Allergies   Allergen Reactions   • Ace Inhibitors Cough   • Codeine Itching       Physical Exam:  Vital Signs:   Vitals:  "   02/12/20 0835   BP: 120/78   BP Location: Right leg   Patient Position: Sitting   Pulse: 102   Temp: 98.1 °F (36.7 °C)   SpO2: 100%  Comment: resting  at room air   Weight: 68.8 kg (151 lb 9.6 oz)   Height: 160 cm (63\")       Physical Exam   Constitutional: She is oriented to person, place, and time. She appears well-developed.   HENT:   Head: Normocephalic and atraumatic.   Nose: Nose normal.   Mouth/Throat: Oropharynx is clear and moist.   Eyes: Pupils are equal, round, and reactive to light. Conjunctivae are normal.   Neck: Normal range of motion. Neck supple.   Cardiovascular: Normal rate and regular rhythm. Exam reveals no gallop and no friction rub.   No murmur heard.  Pulmonary/Chest: Effort normal and breath sounds normal. She has no wheezes. She has no rales.   Abdominal: Soft. Bowel sounds are normal.   Musculoskeletal: Normal range of motion. She exhibits no edema.   Neurological: She is alert and oriented to person, place, and time.   Skin: Skin is warm and dry. No erythema.   Psychiatric: She has a normal mood and affect. Her behavior is normal.   Nursing note and vitals reviewed.      Results Review:   - I personally reviewed the pts imaging from CT the chest from 2/7/2020 showed no acute airspace disease, nodules, or masses.  No signs of recurrent malignancy.  - I personally reviewed the pts labs from 2/6/2020 which was significant for a normal CBC, and a CMP that was only notable for an elevated AST 57, alk phos of 122, sodium of 134, and chloride of 96.  - I personally reviewed the pts PFT from 2/12/2020 showed no obstruction or restriction with a normal DLCO.  FEV1 noted at 118%    Assessment / Plan:   Cough  -Cough is currently acute in nature.  I expressed to her that given the acuity this is most likely postinfectious from the current sinusitis episode that she had had.  Is also possible that the initial cough was secondary to lisinopril but that should be resolving now that she is on the " losartan.  From this point on I would say that she require 3 months of cough before consider this to be chronic and at which point I explained to her that the most common causes are asthma, allergies, and reflux.  In addition to this I reviewed the CT scan and informed her that there was no signs of interstitial lung disease or recurrence of her malignancy.  In addition to that the PFT showed no signs of obstructive or restrictive disease that could explain the cough.  -Therefore for the acute cough I went ahead and informed her that she can take Benadryl at night, Zyrtec during the day, I am holding on the pseudoephedrine given that she has had difficulties with her blood pressure recently.  I have also asked her to use Mucinex DM every 12 hours, and then to start Flonase 2 puffs per nostril nightly with the use of saline to help stop the congestion.  I feel that the most likely cause of her cough currently is ongoing postnasal drip.  She verbalized understanding this and agreed to the plan above I would have given her codeine but she noted that she was allergic and therefore we will not try this.    Adenocarcinoma of lung, stage 1, left (CMS/HCC)  -Status post resection in 2018, no signs of recurrence on the CT scan from today as noted in the results section.  She continue to follow with her oncologist as previously done.    Essential hypertension  -Blood pressure is now well controlled on hydrochlorothiazide and losartan, as noted above lisinopril could have been a contributor to the cough but this is now been stopped.  She should continue her current regimen.  I am holding on giving her pseudoephedrine for the cough given that the blood pressure was the initial cause of having to change medications.    Acute recurrent frontal sinusitis  -She just completed a course of azithromycin with significant improvement.  I informed her that if she continues to have some sinusitis after the next 2 weeks with no improvement  that she can try 10 days of Augmentin to see if this helped.  I went ahead and sent a prescription for Augmentin to the pharmacy today that when she could pick it up if her symptoms did not start to resolve.      Follow Up:   Return in about 3 months (around 5/12/2020) for If the patient's cough is still present, otherwise there is no need for her to return..     STACEY Hurley, DO  Pulmonary and Critical Care Medicine  Note Electronically Signed    Please note that portions of this note may have been completed with a voice recognition program. Efforts were made to edit the dictations, but occasionally words are mistranscribed.

## 2020-02-25 DIAGNOSIS — I10 ESSENTIAL HYPERTENSION: ICD-10-CM

## 2020-02-26 RX ORDER — LISINOPRIL 10 MG/1
TABLET ORAL
Qty: 30 TABLET | Refills: 0 | OUTPATIENT
Start: 2020-02-26

## 2020-03-26 ENCOUNTER — TELEPHONE (OUTPATIENT)
Dept: FAMILY MEDICINE CLINIC | Facility: CLINIC | Age: 65
End: 2020-03-26

## 2020-03-26 DIAGNOSIS — I10 ESSENTIAL HYPERTENSION: Primary | ICD-10-CM

## 2020-03-26 RX ORDER — LOSARTAN POTASSIUM 50 MG/1
50 TABLET ORAL DAILY
Qty: 90 TABLET | Refills: 1 | Status: SHIPPED | OUTPATIENT
Start: 2020-03-26 | End: 2020-05-26 | Stop reason: DRUGHIGH

## 2020-03-27 NOTE — TELEPHONE ENCOUNTER
Patient reports elevated blood pressure. Increase losartan to 50mg. Continue to monitor and report readings.

## 2020-05-01 ENCOUNTER — TELEPHONE (OUTPATIENT)
Dept: FAMILY MEDICINE CLINIC | Facility: CLINIC | Age: 65
End: 2020-05-01

## 2020-05-01 DIAGNOSIS — I10 ESSENTIAL HYPERTENSION: Primary | ICD-10-CM

## 2020-05-01 RX ORDER — AMLODIPINE BESYLATE 5 MG/1
5 TABLET ORAL DAILY
Qty: 90 TABLET | Refills: 1 | Status: SHIPPED | OUTPATIENT
Start: 2020-05-01 | End: 2020-05-26 | Stop reason: SDUPTHER

## 2020-05-01 NOTE — TELEPHONE ENCOUNTER
Patient informs me her bp is labile with -180/.Currently on cozaar 50 with hcts 25. Will add Amlodipine 5.

## 2020-05-26 ENCOUNTER — TELEPHONE (OUTPATIENT)
Dept: FAMILY MEDICINE CLINIC | Facility: CLINIC | Age: 65
End: 2020-05-26

## 2020-05-26 DIAGNOSIS — I10 ESSENTIAL HYPERTENSION: ICD-10-CM

## 2020-05-26 RX ORDER — LOSARTAN POTASSIUM AND HYDROCHLOROTHIAZIDE 25; 100 MG/1; MG/1
1 TABLET ORAL DAILY
Qty: 90 TABLET | Refills: 1 | Status: SHIPPED | OUTPATIENT
Start: 2020-05-26 | End: 2020-08-28

## 2020-05-26 RX ORDER — AMLODIPINE BESYLATE 5 MG/1
5 TABLET ORAL DAILY
Qty: 90 TABLET | Refills: 1 | Status: SHIPPED | OUTPATIENT
Start: 2020-05-26 | End: 2020-10-30 | Stop reason: SDUPTHER

## 2020-05-26 NOTE — TELEPHONE ENCOUNTER
Pharmacy called to verify if pt's     hydroCHLOROthiazide (HYDRODIURIL) 25 MG tablet    Should be 25mg or 12.5mg The original order says 12.5 and the new order says 25 she would like to know if she is supposed to take them both      Shahab BERNAL  PH: [phone]  Fax: [phone]

## 2020-06-17 DIAGNOSIS — K21.9 GASTROESOPHAGEAL REFLUX DISEASE WITHOUT ESOPHAGITIS: ICD-10-CM

## 2020-06-17 RX ORDER — OMEPRAZOLE 20 MG/1
20 CAPSULE, DELAYED RELEASE ORAL DAILY
Qty: 30 CAPSULE | Refills: 5 | Status: SHIPPED | OUTPATIENT
Start: 2020-06-17 | End: 2020-10-30 | Stop reason: SDUPTHER

## 2020-07-18 DIAGNOSIS — I10 ESSENTIAL HYPERTENSION: ICD-10-CM

## 2020-07-18 RX ORDER — HYDROCHLOROTHIAZIDE 25 MG/1
TABLET ORAL
Qty: 90 TABLET | Refills: 0 | OUTPATIENT
Start: 2020-07-18

## 2020-08-28 DIAGNOSIS — I10 ESSENTIAL HYPERTENSION: ICD-10-CM

## 2020-08-28 DIAGNOSIS — E78.01 FAMILIAL HYPERCHOLESTEROLEMIA: ICD-10-CM

## 2020-08-28 RX ORDER — HYDROCHLOROTHIAZIDE 12.5 MG/1
CAPSULE, GELATIN COATED ORAL
Qty: 90 CAPSULE | Refills: 0 | Status: SHIPPED | OUTPATIENT
Start: 2020-08-28 | End: 2020-09-02 | Stop reason: DRUGHIGH

## 2020-08-28 RX ORDER — ATORVASTATIN CALCIUM 20 MG/1
TABLET, FILM COATED ORAL
Qty: 90 TABLET | Refills: 0 | Status: SHIPPED | OUTPATIENT
Start: 2020-08-28 | End: 2020-10-30 | Stop reason: SDUPTHER

## 2020-08-28 RX ORDER — LOSARTAN POTASSIUM AND HYDROCHLOROTHIAZIDE 25; 100 MG/1; MG/1
TABLET ORAL
Qty: 90 TABLET | Refills: 0 | Status: SHIPPED | OUTPATIENT
Start: 2020-08-28 | End: 2020-10-30 | Stop reason: SDUPTHER

## 2020-09-01 ENCOUNTER — PATIENT MESSAGE (OUTPATIENT)
Dept: FAMILY MEDICINE CLINIC | Facility: CLINIC | Age: 65
End: 2020-09-01

## 2020-09-01 DIAGNOSIS — I10 ESSENTIAL HYPERTENSION: Primary | ICD-10-CM

## 2020-09-01 DIAGNOSIS — E78.01 FAMILIAL HYPERCHOLESTEROLEMIA: ICD-10-CM

## 2020-09-01 DIAGNOSIS — R00.2 INTERMITTENT PALPITATIONS: ICD-10-CM

## 2020-09-02 RX ORDER — METOPROLOL TARTRATE 50 MG/1
50 TABLET, FILM COATED ORAL 2 TIMES DAILY
Qty: 60 TABLET | Refills: 2 | Status: SHIPPED | OUTPATIENT
Start: 2020-09-02 | End: 2020-10-30 | Stop reason: SDUPTHER

## 2020-09-02 NOTE — TELEPHONE ENCOUNTER
Continue Norvasc 5mg daily. Losartan/hctz 100/25 daily and add metoprolol 50mg bid. Will go ahead and place referral to cardiology.

## 2020-09-02 NOTE — TELEPHONE ENCOUNTER
From: Linn Bean  To: Anthony Jang DNP, APRN  Sent: 9/1/2020 6:22 PM EDT  Subject: Non-Urgent Medical Question    Hey there, my insurance coverage will change mid Nov and I will move to [facility] [address]. Right now I am over the Out of pocket on mine so I am trying to think of everything I might need to cover . Vicky had my blood pressure monitor for the last few weeks after delivery as she was having issues. Hers is undercontrol and I have been monitoring mine now for a couple of weeks. It fluctuates all over but is average of 145/98. Anyway,I think you said you might refer me to a cardiologist if this last round of meds didn't get it under control. Just let me know what you think. thanks, Linn

## 2020-09-22 ENCOUNTER — OFFICE VISIT (OUTPATIENT)
Dept: FAMILY MEDICINE CLINIC | Facility: CLINIC | Age: 65
End: 2020-09-22

## 2020-09-22 DIAGNOSIS — R22.2 MASS OF CHEST WALL, RIGHT: ICD-10-CM

## 2020-09-22 DIAGNOSIS — K63.9 COLONIC THICKENING: ICD-10-CM

## 2020-09-22 DIAGNOSIS — D05.11 DUCTAL CARCINOMA IN SITU (DCIS) OF RIGHT BREAST: ICD-10-CM

## 2020-09-22 DIAGNOSIS — C34.92 ADENOCARCINOMA OF LUNG, STAGE 1, LEFT (HCC): Primary | ICD-10-CM

## 2020-09-22 DIAGNOSIS — R19.01 ABDOMINAL MASS, RIGHT UPPER QUADRANT: ICD-10-CM

## 2020-09-22 DIAGNOSIS — I10 ESSENTIAL HYPERTENSION: ICD-10-CM

## 2020-09-22 PROCEDURE — 99442 PR PHYS/QHP TELEPHONE EVALUATION 11-20 MIN: CPT | Performed by: NURSE PRACTITIONER

## 2020-09-22 RX ORDER — LOSARTAN POTASSIUM 50 MG/1
TABLET ORAL
Qty: 90 TABLET | Refills: 0 | OUTPATIENT
Start: 2020-09-22

## 2020-09-22 NOTE — PROGRESS NOTES
Subjective   Linn Bean is a 65 y.o. female.   You have chosen to receive care through a telephone visit. Do you consent to use a telephone visit for your medical care today? Yes    History of Present Illness Complains of 2 weeks of raised area on her right side. She moved some furniture and strained her side and felt bruised. While she palpated the area she found a long, linear, raided area on her right side from her bra strap area extending toward the waist. Now there is a bruise the size of a quarter that is blue and purple distally. The mass has not changed.  Tender to lay on that side and wakes her from sleep. No treatment. Has history of breast and lung cancer. No unexplained weight gain or weight loss. Coughs at night for 2 years. She also has some headaches behind the right eye in the evenings. BP is controlled on 4 agents.  Seeing Dr Abi Cordero in November for surveillance of lung cancer and breast cancer.  Had normal mammogram in August.  Had Colonoscopy with thickening of colon in sigmoid 2 weeks ago.        Outpatient Encounter Medications as of 9/22/2020   Medication Sig Dispense Refill   • amLODIPine (NORVASC) 5 MG tablet Take 1 tablet by mouth Daily. 90 tablet 1   • atorvastatin (LIPITOR) 20 MG tablet TAKE ONE TABLET BY MOUTH DAILY 90 tablet 0   • benzonatate (TESSALON) 200 MG capsule Take 1 capsule by mouth 3 (Three) Times a Day As Needed for Cough. 60 capsule 0   • calcium carbonate (OS-SAAD) 600 MG tablet Take 600 mg by mouth Daily.     • letrozole (FEMARA) 2.5 MG tablet      • losartan-hydrochlorothiazide (HYZAAR) 100-25 MG per tablet TAKE ONE TABLET BY MOUTH DAILY 90 tablet 0   • metoprolol tartrate (LOPRESSOR) 50 MG tablet Take 1 tablet by mouth 2 (Two) Times a Day. 60 tablet 2   • omeprazole (priLOSEC) 20 MG capsule Take 1 capsule by mouth Daily. 30 capsule 5   • SYNTHROID 125 MCG tablet Take 1 tablet by mouth Daily. 90 tablet 3   • venlafaxine XR (EFFEXOR-XR) 150 MG 24 hr capsule Take  150 mg by mouth Daily.     • Vitamin D, Cholecalciferol, (CHOLECALCIFEROL) 10 MCG (400 UNIT) tablet Take 800 Units by mouth Daily.       No facility-administered encounter medications on file as of 9/22/2020.        The following portions of the patient's history were reviewed and updated as appropriate: allergies, current medications, past family history, past medical history, past social history, past surgical history and problem list.    Review of Systems   Constitutional: Negative for appetite change, fever, unexpected weight gain and unexpected weight loss.   HENT: Negative for congestion, nosebleeds, sore throat and trouble swallowing.    Eyes: Negative for visual disturbance.   Respiratory: Negative for cough, shortness of breath and wheezing.    Cardiovascular: Negative for chest pain, palpitations and leg swelling.        Chest wall pain on right   Gastrointestinal: Positive for abdominal pain. Negative for blood in stool, constipation, diarrhea, nausea and vomiting.   Endocrine: Negative for polydipsia, polyphagia and polyuria.   Genitourinary: Negative for dysuria, frequency and hematuria.   Musculoskeletal: Negative for arthralgias, joint swelling and myalgias.   Skin: Negative for rash.   Neurological: Positive for headache. Negative for dizziness, seizures, syncope and numbness.   Hematological: Negative for adenopathy. Does not bruise/bleed easily.   Psychiatric/Behavioral: Negative for behavioral problems, sleep disturbance and depressed mood. The patient is not nervous/anxious.        Objective     Visit Vitals  LMP  (LMP Unknown)       Physical Exam  Constitutional:       General: She is not in acute distress.     Appearance: She is well-developed.   HENT:      Head: Normocephalic and atraumatic.      Nose: Nose normal.   Eyes:      Conjunctiva/sclera: Conjunctivae normal.   Neck:      Thyroid: No thyromegaly.      Trachea: No tracheal deviation.   Cardiovascular:      Heart sounds: No murmur. No  friction rub. No gallop.    Pulmonary:      Effort: Pulmonary effort is normal. No respiratory distress.   Abdominal:      Palpations: There is mass.      Comments: I was able to assess the area outside of the office prior to this appointment. There is a linear mass that extends fro the T4 area to almost the waist on right between the mid clavicular line and the mid axillary line. It is firm and narrow, approx 6mm wide and 10cm long.   Musculoskeletal:         General: No deformity.   Skin:     General: Skin is warm and dry.      Capillary Refill: Capillary refill takes less than 2 seconds.      Findings: No erythema or rash.   Neurological:      Mental Status: She is alert and oriented to person, place, and time.      Coordination: Coordination normal.   Psychiatric:         Speech: Speech normal.         Behavior: Behavior normal.         Thought Content: Thought content normal.         Judgment: Judgment normal.           Assessment/Plan   Diagnoses and all orders for this visit:    Adenocarcinoma of lung, stage 1, left (CMS/HCC)  -     CT Chest Without Contrast; Future  -     CT Abdomen Pelvis With & Without Contrast; Future    Ductal carcinoma in situ (DCIS) of right breast  -     CT Chest Without Contrast; Future  -     CT Abdomen Pelvis With & Without Contrast; Future    Mass of chest wall, right  -     CT Chest Without Contrast; Future  -     CT Abdomen Pelvis With & Without Contrast; Future    Abdominal mass, right upper quadrant  -     CT Abdomen Pelvis With & Without Contrast; Future    Colonic thickening  -     CT Abdomen Pelvis With & Without Contrast; Future    This is an usually finding and concerning in a patient with a history of breast and lung cancer and new finding of thickening of colon.  Will obtain CT of chest, abd and pelvis to assess new mass that extends from chest to abd and sigmoid thickening on colonoscopy.  Will follow up with results.    Unable to complete visit using a video  connection to the patient. A phone visit was used to complete this visits. Total time of discussion was 25 minutes.  This was an audio and video enabled telemedicine encounter.

## 2020-09-29 DIAGNOSIS — I10 ESSENTIAL HYPERTENSION: ICD-10-CM

## 2020-09-30 ENCOUNTER — TELEPHONE (OUTPATIENT)
Dept: FAMILY MEDICINE CLINIC | Facility: CLINIC | Age: 65
End: 2020-09-30

## 2020-09-30 DIAGNOSIS — I10 ESSENTIAL HYPERTENSION: ICD-10-CM

## 2020-09-30 RX ORDER — LOSARTAN POTASSIUM 50 MG/1
TABLET ORAL
Qty: 90 TABLET | Refills: 0 | OUTPATIENT
Start: 2020-09-30

## 2020-09-30 NOTE — TELEPHONE ENCOUNTER
[facility] pharmacy requested refills for losartan-hydrochlorothiazide (HYZAAR) 100-25 MG per tablet [[id number]] (Order [id number])  [facility] [phone]

## 2020-10-05 ENCOUNTER — HOSPITAL ENCOUNTER (OUTPATIENT)
Dept: CT IMAGING | Facility: HOSPITAL | Age: 65
Discharge: HOME | End: 2020-10-05
Admitting: NURSE PRACTITIONER

## 2020-10-05 DIAGNOSIS — R22.2 MASS OF CHEST WALL, RIGHT: ICD-10-CM

## 2020-10-05 DIAGNOSIS — R19.01 ABDOMINAL MASS, RIGHT UPPER QUADRANT: ICD-10-CM

## 2020-10-05 DIAGNOSIS — K63.9 COLONIC THICKENING: ICD-10-CM

## 2020-10-05 DIAGNOSIS — C34.92 ADENOCARCINOMA OF LUNG, STAGE 1, LEFT (HCC): ICD-10-CM

## 2020-10-05 DIAGNOSIS — D05.11 DUCTAL CARCINOMA IN SITU (DCIS) OF RIGHT BREAST: ICD-10-CM

## 2020-10-05 LAB — CREAT BLDA-MCNC: 0.6 MG/DL (ref 0.6–1.3)

## 2020-10-05 PROCEDURE — 71270 CT THORAX DX C-/C+: CPT

## 2020-10-05 PROCEDURE — 74178 CT ABD&PLV WO CNTR FLWD CNTR: CPT

## 2020-10-05 PROCEDURE — 25010000002 IOPAMIDOL 61 % SOLUTION: Performed by: NURSE PRACTITIONER

## 2020-10-05 PROCEDURE — 82565 ASSAY OF CREATININE: CPT

## 2020-10-05 RX ADMIN — IOPAMIDOL 85 ML: 612 INJECTION, SOLUTION INTRAVENOUS at 15:50

## 2020-10-07 ENCOUNTER — TELEPHONE (OUTPATIENT)
Dept: FAMILY MEDICINE CLINIC | Facility: CLINIC | Age: 65
End: 2020-10-07

## 2020-10-07 ENCOUNTER — CONSULT (OUTPATIENT)
Dept: CARDIOLOGY | Facility: CLINIC | Age: 65
End: 2020-10-07

## 2020-10-07 VITALS
TEMPERATURE: 96.9 F | HEART RATE: 58 BPM | SYSTOLIC BLOOD PRESSURE: 126 MMHG | HEIGHT: 63 IN | DIASTOLIC BLOOD PRESSURE: 72 MMHG | BODY MASS INDEX: 26.86 KG/M2 | OXYGEN SATURATION: 96 % | WEIGHT: 151.6 LBS

## 2020-10-07 DIAGNOSIS — R01.1 HEART MURMUR: ICD-10-CM

## 2020-10-07 DIAGNOSIS — I10 ESSENTIAL HYPERTENSION: ICD-10-CM

## 2020-10-07 DIAGNOSIS — R06.09 DYSPNEA ON EXERTION: Primary | ICD-10-CM

## 2020-10-07 DIAGNOSIS — E78.2 MIXED HYPERLIPIDEMIA: ICD-10-CM

## 2020-10-07 PROCEDURE — 93000 ELECTROCARDIOGRAM COMPLETE: CPT | Performed by: INTERNAL MEDICINE

## 2020-10-07 PROCEDURE — 99204 OFFICE O/P NEW MOD 45 MIN: CPT | Performed by: INTERNAL MEDICINE

## 2020-10-07 NOTE — TELEPHONE ENCOUNTER
Discussed recent CT and visit with cardiology. Will be having an echo. She has appt with oncology in 2 weeks. She will have them follow up on physical exam findings in RUQ.

## 2020-10-07 NOTE — PROGRESS NOTES
[facility] at [facility]  Consultation H&P  Linn Bean  1955  [phone] (work).    VISIT DATE:  10/07/2020    PCP: Anthony Jang, ELIEL, APRN  [address]  [facility] [id number]    CC:  Chief Complaint   Patient presents with   • Hypertension   • Palpitations       ASSESSMENT:   Diagnosis Plan   1. Dyspnea on exertion  Adult Transthoracic Echo Complete W/ Cont if Necessary Per Protocol   2. Essential hypertension     3. Mixed hyperlipidemia     4. Heart murmur         PLAN:  Hypertension, essential: Other than underlying mild sleep apnea I do not have suspicion for any other secondary etiologies.  Goal less than 130/80 mmHg.  Continue current medical therapy and keep a blood pressure log.  Dietary and lifestyle modifications to achieve weight loss, recommended consistent exercise.  Limiting sodium intake.  Not recommending change in pharmacologic therapy at this time.  Potential changes in medical therapy if required in the future would be either to increase amlodipine to 10 mg p.o. daily, switch metoprolol tartrate to a beta-blocker which is more efficacious with afterload control such as carvedilol, bisoprolol, or nebivolol, and potential addition of Aldactone.    Dyspnea on exertion with underlying cardiac murmur: Transthoracic echo pending to assess underlying myocardial structure and function.  Currently no clinical suspicion for underlying coronary ischemia.    Hyperlipidemia: Well-controlled.  Continue current medical therapy.  Goal LDL less than 100.      History of Present Illness   65-year-old female with history of hypertension, dyslipidemia, remote history of smoking presenting with episodes of shortness of breath with exertion and recent worsening in blood pressure control.  She has had high blood pressure for approximately 2 years, onset shortly after being treated for lung nodule and breast cancer requiring lumpectomy and radiation  "therapy.  Follow-up imaging has been stable and negative for recurrent disease.  She denies chest discomfort.  Does report shortness of breath with such activities as going up a flight of stairs.  Intermittent brief palpitations associated with anxiety.  Developed cough on ACE inhibitor.  Compliant with current medical therapy.  Feels like her blood pressure has begun to improved after initiation of metoprolol about 4 weeks ago.  Blood pressures are predominantly running less than 130/85 mmHg.  She will have intermittent spikes in her systolic blood pressure up to 160 mmHg with diastolic blood pressures as high as 99 mmHg.  Her lowest recorded blood pressure over the previous 4 weeks was 85/66 mmHg.  She is not exercising on a regular basis.  Was diagnosed with mild sleep apnea 2 to 3 years ago, she did not tolerate CPAP but is using a dental appliance.  Her  does report that she snores prominently.  Only 1 caffeinated beverage per day, less than 2 glasses of wine per day.    PHYSICAL EXAMINATION:  Vitals:    10/07/20 1059   BP: 126/72   BP Location: Left arm   Patient Position: Sitting   Pulse: 58   Temp: 96.9 °F (36.1 °C)   SpO2: 96%   Weight: 68.8 kg (151 lb 9.6 oz)   Height: 160 cm (63\")     General Appearance:    Alert, cooperative, no distress, appears stated age   Head:    Normocephalic, without obvious abnormality, atraumatic   Eyes:    conjunctiva/corneas clear, EOM's intact, fundi     benign, both eyes   Ears:    Normal TM's and external ear canals, both ears   Nose:   Nares normal, septum midline, mucosa normal, no drainage    or sinus tenderness   Throat:   Lips, mucosa, and tongue normal; teeth and gums normal   Neck:   Supple, symmetrical, trachea midline, no adenopathy;     thyroid:  no enlargement/tenderness/nodules; no carotid    bruit or JVD   Back:     Symmetric, no curvature, ROM normal, no CVA tenderness   Lungs:     Clear to auscultation bilaterally, respirations unlabored   Chest Wall:  "   No tenderness or deformity    Heart:    Regular rate and rhythm, S1 and S2 normal, 2/6 early peaking systolic murmur right upper sternal border, rub   or gallop, normal carotid impulse bilaterally without bruit.   Abdomen:     Soft, non-tender, bowel sounds active all four quadrants,     no masses, no organomegaly   Extremities:   Extremities normal, atraumatic, no cyanosis or edema   Pulses:   2+ and symmetric all extremities   Skin:   Skin color, texture, turgor normal, no rashes or lesions   Lymph nodes:   Cervical, supraclavicular, and axillary nodes normal   Neurologic:   normal strength, sensation intact     throughout       Diagnostic Data:    ECG 12 Lead    Date/Time: 10/7/2020 11:23 AM  Performed by: Vernon Bowman III, MD  Authorized by: Vernon Bowman III, MD   Comparison: compared with previous ECG from 12/27/2020  Similar to previous ECG  Rhythm: sinus bradycardia  QRS axis: right  Other findings: poor R wave progression    Clinical impression: non-specific ECG          Lab Results   Component Value Date    TRIG 247 (H) 11/18/2019    HDL 59 11/18/2019     Lab Results   Component Value Date    GLUCOSE 88 02/06/2020    BUN 13 02/06/2020    CREATININE 0.60 10/05/2020     (L) 02/06/2020    K 4.3 02/06/2020    CL 96 (L) 02/06/2020    CO2 26.6 02/06/2020     Lab Results   Component Value Date    HGBA1C 5.6 11/18/2019     Lab Results   Component Value Date    WBC 8.38 02/06/2020    HGB 13.7 02/06/2020    HCT 39.6 02/06/2020     02/06/2020       PROBLEM LIST:  Patient Active Problem List   Diagnosis   • Iliotibial band syndrome   • Adenocarcinoma of lung, stage 1, left (CMS/HCC)   • DDD (degenerative disc disease), cervical   • Ductal carcinoma in situ (DCIS) of right breast   • LUCILLE (generalized anxiety disorder)   • HLD (hyperlipidemia)   • HTN (hypertension)   • Hypothyroidism   • Osteoarthritis of hands, bilateral   • Cough   • Heart murmur       PAST MEDICAL HX  Past Medical History:    Diagnosis Date   • Adenocarcinoma of lung, stage 1, left (CMS/HCC) 2018    LLL   • DDD (degenerative disc disease), cervical    • Ductal carcinoma in situ (DCIS) of right breast 2018   • LUCILLE (generalized anxiety disorder)    • HLD (hyperlipidemia)    • HTN (hypertension)    • Hypothyroidism    • Osteoarthritis of hands, bilateral        Allergies  Allergies   Allergen Reactions   • Ace Inhibitors Cough   • Codeine Itching       Current Medications    Current Outpatient Medications:   •  amLODIPine (NORVASC) 5 MG tablet, Take 1 tablet by mouth Daily., Disp: 90 tablet, Rfl: 1  •  atorvastatin (LIPITOR) 20 MG tablet, TAKE ONE TABLET BY MOUTH DAILY, Disp: 90 tablet, Rfl: 0  •  benzonatate (TESSALON) 200 MG capsule, Take 1 capsule by mouth 3 (Three) Times a Day As Needed for Cough., Disp: 60 capsule, Rfl: 0  •  calcium carbonate (OS-SAAD) 600 MG tablet, Take 600 mg by mouth Daily., Disp: , Rfl:   •  letrozole (FEMARA) 2.5 MG tablet, Take 2.5 mg by mouth Daily., Disp: , Rfl:   •  losartan-hydrochlorothiazide (HYZAAR) 100-25 MG per tablet, TAKE ONE TABLET BY MOUTH DAILY, Disp: 90 tablet, Rfl: 0  •  metoprolol tartrate (LOPRESSOR) 50 MG tablet, Take 1 tablet by mouth 2 (Two) Times a Day., Disp: 60 tablet, Rfl: 2  •  omeprazole (priLOSEC) 20 MG capsule, Take 1 capsule by mouth Daily., Disp: 30 capsule, Rfl: 5  •  SYNTHROID 125 MCG tablet, Take 1 tablet by mouth Daily., Disp: 90 tablet, Rfl: 3  •  venlafaxine XR (EFFEXOR-XR) 150 MG 24 hr capsule, Take 150 mg by mouth Daily., Disp: , Rfl:   •  Vitamin D, Cholecalciferol, (CHOLECALCIFEROL) 10 MCG (400 UNIT) tablet, Take 800 Units by mouth Daily., Disp: , Rfl:          ROS  Review of Systems   Eyes: Positive for blurred vision.   Respiratory: Positive for cough, sleep disturbances due to breathing and snoring.    Musculoskeletal: Positive for arthritis, muscle weakness, myalgias and neck pain.   Neurological: Positive for headaches.       All other body systems reviewed and are  negative    SOCIAL HX  Social History     Socioeconomic History   • Marital status:      Spouse name: Mahesh   • Number of children: 2   • Years of education: College   • Highest education level: Not on file   Occupational History   • Occupation: HR     Employer: [employer]   Tobacco Use   • Smoking status: Never Smoker   • Smokeless tobacco: Never Used   Substance and Sexual Activity   • Alcohol use: Yes     Alcohol/week: 5.0 standard drinks     Types: 5 Glasses of wine per week   • Drug use: No   • Sexual activity: Yes     Partners: Male       FAMILY HX  Family History   Problem Relation Age of Onset   • Asthma Mother    • Ovarian cancer Mother    • Hypothyroidism Mother    • Heart failure Father    • Prostate cancer Father    • Heart attack Father    • Breast cancer Sister              Vernon Bowman III, MD, FACC    Answers for HPI/ROS submitted by the patient on 10/5/2020   Hypertension  What is the primary reason for your visit?: High Blood Pressure  Chronicity: recurrent  Onset: more than 1 year ago  Progression since onset: waxing and waning  Condition status: resistant  anxiety: Yes  Agents associated with hypertension: NSAIDs, thyroid hormones  CAD risks: dyslipidemia, family history, post-menopausal state  Compliance problems: no compliance problems

## 2020-10-26 DIAGNOSIS — I10 ESSENTIAL HYPERTENSION: ICD-10-CM

## 2020-10-26 RX ORDER — LOSARTAN POTASSIUM 50 MG/1
TABLET ORAL
Qty: 90 TABLET | Refills: 0 | OUTPATIENT
Start: 2020-10-26

## 2020-10-26 RX ORDER — METOPROLOL TARTRATE 50 MG/1
TABLET, FILM COATED ORAL
Qty: 60 TABLET | Refills: 1 | OUTPATIENT
Start: 2020-10-26

## 2020-10-30 DIAGNOSIS — K21.9 GASTROESOPHAGEAL REFLUX DISEASE WITHOUT ESOPHAGITIS: ICD-10-CM

## 2020-10-30 DIAGNOSIS — E03.9 ACQUIRED HYPOTHYROIDISM: ICD-10-CM

## 2020-10-30 DIAGNOSIS — E78.01 FAMILIAL HYPERCHOLESTEROLEMIA: ICD-10-CM

## 2020-10-30 DIAGNOSIS — I10 ESSENTIAL HYPERTENSION: ICD-10-CM

## 2020-10-30 RX ORDER — OMEPRAZOLE 20 MG/1
20 CAPSULE, DELAYED RELEASE ORAL DAILY
Qty: 90 CAPSULE | Refills: 3 | Status: SHIPPED | OUTPATIENT
Start: 2020-10-30

## 2020-10-30 RX ORDER — LEVOTHYROXINE SODIUM 125 MCG
125 TABLET ORAL DAILY
Qty: 90 TABLET | Refills: 1 | Status: SHIPPED | OUTPATIENT
Start: 2020-10-30 | End: 2020-12-06 | Stop reason: DRUGHIGH

## 2020-10-30 RX ORDER — AMLODIPINE BESYLATE 5 MG/1
5 TABLET ORAL DAILY
Qty: 90 TABLET | Refills: 3 | Status: SHIPPED | OUTPATIENT
Start: 2020-10-30

## 2020-10-30 RX ORDER — METOPROLOL TARTRATE 50 MG/1
50 TABLET, FILM COATED ORAL 2 TIMES DAILY
Qty: 180 TABLET | Refills: 3 | Status: SHIPPED | OUTPATIENT
Start: 2020-10-30 | End: 2021-04-19

## 2020-10-30 RX ORDER — VENLAFAXINE HYDROCHLORIDE 150 MG/1
150 CAPSULE, EXTENDED RELEASE ORAL DAILY
Qty: 90 CAPSULE | Refills: 3 | Status: SHIPPED | OUTPATIENT
Start: 2020-10-30

## 2020-10-30 RX ORDER — ATORVASTATIN CALCIUM 20 MG/1
20 TABLET, FILM COATED ORAL DAILY
Qty: 90 TABLET | Refills: 3 | Status: SHIPPED | OUTPATIENT
Start: 2020-10-30 | End: 2020-12-06 | Stop reason: DRUGHIGH

## 2020-10-30 RX ORDER — LOSARTAN POTASSIUM AND HYDROCHLOROTHIAZIDE 25; 100 MG/1; MG/1
1 TABLET ORAL DAILY
Qty: 90 TABLET | Refills: 3 | Status: SHIPPED | OUTPATIENT
Start: 2020-10-30

## 2020-11-01 DIAGNOSIS — I10 ESSENTIAL HYPERTENSION: ICD-10-CM

## 2020-11-10 ENCOUNTER — HOSPITAL ENCOUNTER (OUTPATIENT)
Dept: CARDIOLOGY | Facility: HOSPITAL | Age: 65
Discharge: HOME | End: 2020-11-10
Admitting: INTERNAL MEDICINE

## 2020-11-10 VITALS — WEIGHT: 148 LBS | HEIGHT: 63 IN | BODY MASS INDEX: 26.22 KG/M2

## 2020-11-10 DIAGNOSIS — R06.09 DYSPNEA ON EXERTION: ICD-10-CM

## 2020-11-10 PROCEDURE — 93306 TTE W/DOPPLER COMPLETE: CPT | Performed by: INTERNAL MEDICINE

## 2020-11-10 PROCEDURE — 93306 TTE W/DOPPLER COMPLETE: CPT

## 2020-11-11 ENCOUNTER — TELEPHONE (OUTPATIENT)
Dept: CARDIOLOGY | Facility: CLINIC | Age: 65
End: 2020-11-11

## 2020-11-11 LAB
ASCENDING AORTA: 3 CM
BH CV ECHO MEAS - AO MAX PG (FULL): 11.3 MMHG
BH CV ECHO MEAS - AO MAX PG: 18 MMHG
BH CV ECHO MEAS - AO MEAN PG (FULL): 6 MMHG
BH CV ECHO MEAS - AO MEAN PG: 9 MMHG
BH CV ECHO MEAS - AO ROOT AREA (BSA CORRECTED): 1.5
BH CV ECHO MEAS - AO ROOT AREA: 5.3 CM^2
BH CV ECHO MEAS - AO ROOT DIAM: 2.6 CM
BH CV ECHO MEAS - AO V2 MAX: 212 CM/SEC
BH CV ECHO MEAS - AO V2 MEAN: 141 CM/SEC
BH CV ECHO MEAS - AO V2 VTI: 43.7 CM
BH CV ECHO MEAS - ASC AORTA: 3 CM
BH CV ECHO MEAS - AVA(I,A): 2.1 CM^2
BH CV ECHO MEAS - AVA(I,D): 2.1 CM^2
BH CV ECHO MEAS - AVA(V,A): 1.7 CM^2
BH CV ECHO MEAS - AVA(V,D): 1.7 CM^2
BH CV ECHO MEAS - BSA(HAYCOCK): 1.7 M^2
BH CV ECHO MEAS - BSA: 1.7 M^2
BH CV ECHO MEAS - BZI_BMI: 26.2 KILOGRAMS/M^2
BH CV ECHO MEAS - BZI_METRIC_HEIGHT: 160 CM
BH CV ECHO MEAS - BZI_METRIC_WEIGHT: 67.1 KG
BH CV ECHO MEAS - EDV(CUBED): 69.9 ML
BH CV ECHO MEAS - EDV(MOD-SP2): 45 ML
BH CV ECHO MEAS - EDV(MOD-SP4): 62 ML
BH CV ECHO MEAS - EDV(TEICH): 75.1 ML
BH CV ECHO MEAS - EF(CUBED): 76.6 %
BH CV ECHO MEAS - EF(MOD-BP): 61 %
BH CV ECHO MEAS - EF(MOD-SP2): 55.6 %
BH CV ECHO MEAS - EF(MOD-SP4): 62.9 %
BH CV ECHO MEAS - EF(TEICH): 69.1 %
BH CV ECHO MEAS - ESV(CUBED): 16.4 ML
BH CV ECHO MEAS - ESV(MOD-SP2): 20 ML
BH CV ECHO MEAS - ESV(MOD-SP4): 23 ML
BH CV ECHO MEAS - ESV(TEICH): 23.2 ML
BH CV ECHO MEAS - FS: 38.3 %
BH CV ECHO MEAS - IVS/LVPW: 0.96
BH CV ECHO MEAS - IVSD: 1 CM
BH CV ECHO MEAS - LA DIMENSION: 3.4 CM
BH CV ECHO MEAS - LA/AO: 1.3
BH CV ECHO MEAS - LAD MAJOR: 6.5 CM
BH CV ECHO MEAS - LAT PEAK E' VEL: 7.9 CM/SEC
BH CV ECHO MEAS - LATERAL E/E' RATIO: 9.6
BH CV ECHO MEAS - LV DIASTOLIC VOL/BSA (35-75): 36.4 ML/M^2
BH CV ECHO MEAS - LV MASS(C)D: 138.8 GRAMS
BH CV ECHO MEAS - LV MASS(C)DI: 81.6 GRAMS/M^2
BH CV ECHO MEAS - LV MAX PG: 6.7 MMHG
BH CV ECHO MEAS - LV MEAN PG: 3 MMHG
BH CV ECHO MEAS - LV SYSTOLIC VOL/BSA (12-30): 13.5 ML/M^2
BH CV ECHO MEAS - LV V1 MAX: 129 CM/SEC
BH CV ECHO MEAS - LV V1 MEAN: 85.4 CM/SEC
BH CV ECHO MEAS - LV V1 VTI: 31.6 CM
BH CV ECHO MEAS - LVIDD: 4.1 CM
BH CV ECHO MEAS - LVIDS: 2.5 CM
BH CV ECHO MEAS - LVLD AP2: 6.1 CM
BH CV ECHO MEAS - LVLD AP4: 6.5 CM
BH CV ECHO MEAS - LVLS AP2: 5.6 CM
BH CV ECHO MEAS - LVLS AP4: 5.4 CM
BH CV ECHO MEAS - LVOT AREA (M): 2.8 CM^2
BH CV ECHO MEAS - LVOT AREA: 2.8 CM^2
BH CV ECHO MEAS - LVOT DIAM: 1.9 CM
BH CV ECHO MEAS - LVPWD: 1.1 CM
BH CV ECHO MEAS - MED PEAK E' VEL: 5 CM/SEC
BH CV ECHO MEAS - MEDIAL E/E' RATIO: 15
BH CV ECHO MEAS - MV A MAX VEL: 94.3 CM/SEC
BH CV ECHO MEAS - MV DEC SLOPE: 361 CM/SEC^2
BH CV ECHO MEAS - MV DEC TIME: 0.25 SEC
BH CV ECHO MEAS - MV E MAX VEL: 75.5 CM/SEC
BH CV ECHO MEAS - MV E/A: 0.8
BH CV ECHO MEAS - MV P1/2T MAX VEL: 105 CM/SEC
BH CV ECHO MEAS - MV P1/2T: 85.2 MSEC
BH CV ECHO MEAS - MVA P1/2T LCG: 2.1 CM^2
BH CV ECHO MEAS - MVA(P1/2T): 2.6 CM^2
BH CV ECHO MEAS - PA ACC SLOPE: 481.5 CM/SEC^2
BH CV ECHO MEAS - PA ACC TIME: 0.14 SEC
BH CV ECHO MEAS - PA PR(ACCEL): 16 MMHG
BH CV ECHO MEAS - SI(AO): 136.4 ML/M^2
BH CV ECHO MEAS - SI(CUBED): 31.5 ML/M^2
BH CV ECHO MEAS - SI(LVOT): 52.7 ML/M^2
BH CV ECHO MEAS - SI(MOD-SP2): 14.7 ML/M^2
BH CV ECHO MEAS - SI(MOD-SP4): 22.9 ML/M^2
BH CV ECHO MEAS - SI(TEICH): 30.5 ML/M^2
BH CV ECHO MEAS - SV(AO): 232 ML
BH CV ECHO MEAS - SV(CUBED): 53.5 ML
BH CV ECHO MEAS - SV(LVOT): 89.6 ML
BH CV ECHO MEAS - SV(MOD-SP2): 25 ML
BH CV ECHO MEAS - SV(MOD-SP4): 39 ML
BH CV ECHO MEAS - SV(TEICH): 51.9 ML
BH CV ECHO MEAS - TAPSE (>1.6): 2.7 CM
BH CV ECHO MEAS - TR MAX PG: 25 MMHG
BH CV ECHO MEAS - TR MAX VEL: 248.5 CM/SEC
BH CV ECHO MEASUREMENTS AVERAGE E/E' RATIO: 11.71
BH CV VAS BP RIGHT ARM: NORMAL MMHG
BH CV XLRA - RV BASE: 2.6 CM
BH CV XLRA - RV LENGTH: 5.7 CM
BH CV XLRA - RV MID: 2.2 CM
BH CV XLRA - TDI S': 15.2 CM/SEC
MAXIMAL PREDICTED HEART RATE: 157 BPM
STRESS TARGET HR: 133 BPM

## 2020-11-11 NOTE — TELEPHONE ENCOUNTER
----- Message from Vernon Bowman III, MD sent at 11/11/2020 11:35 AM EST -----  Strength of heart muscle is normal.  Heart muscle itself has stiffened up a little bit which can cause some shortness of breath.  We need to continue to control your blood pressure well and continue regular exercise and maintain a healthy weight.

## 2020-12-02 ENCOUNTER — OFFICE VISIT (OUTPATIENT)
Dept: ENDOCRINOLOGY | Facility: CLINIC | Age: 65
End: 2020-12-02

## 2020-12-02 ENCOUNTER — LAB (OUTPATIENT)
Dept: LAB | Facility: HOSPITAL | Age: 65
End: 2020-12-02

## 2020-12-02 VITALS
HEART RATE: 80 BPM | BODY MASS INDEX: 26.79 KG/M2 | HEIGHT: 63 IN | SYSTOLIC BLOOD PRESSURE: 128 MMHG | WEIGHT: 151.2 LBS | DIASTOLIC BLOOD PRESSURE: 80 MMHG

## 2020-12-02 DIAGNOSIS — E04.1 NONTOXIC UNINODULAR GOITER: ICD-10-CM

## 2020-12-02 DIAGNOSIS — E03.9 ACQUIRED HYPOTHYROIDISM: Primary | ICD-10-CM

## 2020-12-02 DIAGNOSIS — E78.2 MIXED HYPERLIPIDEMIA: ICD-10-CM

## 2020-12-02 DIAGNOSIS — R73.01 FASTING HYPERGLYCEMIA: ICD-10-CM

## 2020-12-02 DIAGNOSIS — E03.9 ACQUIRED HYPOTHYROIDISM: ICD-10-CM

## 2020-12-02 LAB
ALBUMIN SERPL-MCNC: 4.5 G/DL (ref 3.5–5.2)
ALBUMIN/GLOB SERPL: 1.9 G/DL
ALP SERPL-CCNC: 87 U/L (ref 39–117)
ALT SERPL W P-5'-P-CCNC: 30 U/L (ref 1–33)
ANION GAP SERPL CALCULATED.3IONS-SCNC: 7.4 MMOL/L (ref 5–15)
AST SERPL-CCNC: 23 U/L (ref 1–32)
BILIRUB SERPL-MCNC: 0.5 MG/DL (ref 0–1.2)
BUN SERPL-MCNC: 13 MG/DL (ref 8–23)
BUN/CREAT SERPL: 20.6 (ref 7–25)
CALCIUM SPEC-SCNC: 9.4 MG/DL (ref 8.6–10.5)
CHLORIDE SERPL-SCNC: 102 MMOL/L (ref 98–107)
CHOLEST SERPL-MCNC: 239 MG/DL (ref 0–200)
CO2 SERPL-SCNC: 28.6 MMOL/L (ref 22–29)
CREAT SERPL-MCNC: 0.63 MG/DL (ref 0.57–1)
GFR SERPL CREATININE-BSD FRML MDRD: 95 ML/MIN/1.73
GLOBULIN UR ELPH-MCNC: 2.4 GM/DL
GLUCOSE SERPL-MCNC: 82 MG/DL (ref 65–99)
HBA1C MFR BLD: 6.14 % (ref 4.8–5.6)
HDLC SERPL-MCNC: 80 MG/DL (ref 40–60)
LDLC SERPL CALC-MCNC: 138 MG/DL (ref 0–100)
LDLC/HDLC SERPL: 1.69 {RATIO}
POTASSIUM SERPL-SCNC: 4.1 MMOL/L (ref 3.5–5.2)
PROT SERPL-MCNC: 6.9 G/DL (ref 6–8.5)
SODIUM SERPL-SCNC: 138 MMOL/L (ref 136–145)
TRIGL SERPL-MCNC: 119 MG/DL (ref 0–150)
TSH SERPL DL<=0.05 MIU/L-ACNC: 0.1 UIU/ML (ref 0.27–4.2)
VLDLC SERPL-MCNC: 21 MG/DL (ref 5–40)

## 2020-12-02 PROCEDURE — 84443 ASSAY THYROID STIM HORMONE: CPT

## 2020-12-02 PROCEDURE — 83036 HEMOGLOBIN GLYCOSYLATED A1C: CPT

## 2020-12-02 PROCEDURE — 99214 OFFICE O/P EST MOD 30 MIN: CPT | Performed by: PHYSICIAN ASSISTANT

## 2020-12-02 PROCEDURE — 80061 LIPID PANEL: CPT

## 2020-12-02 PROCEDURE — 80053 COMPREHEN METABOLIC PANEL: CPT

## 2020-12-02 NOTE — PROGRESS NOTES
"     Office Note      Date: 2020  Patient Name: Linn Bean  MRN: [id number]  : 1955    Chief Complaint   Patient presents with   • Hypothyroidism       History of Present Illness:   Linn Bean is a 65 y.o. female who presents today for hypothyroidism.   She remains on levothyroxine 125mcg qd.  She reports taking this correctly and regularly.  She denies any changes in her neck.  She reports that it feels hard to swallow some mornings.  She reports that she sleeps with her mouth open and does snore.  She reports being diagnosed with CPAP several years ago, but did not tolerate CPAP.  She is using mouthpiece some nights.  She denies symptoms of hypo- or hyperthyroidism at this time.   She reports that BP has been elevated.  She reports this has been controlled since adding metoprolol.  She reports that she retired this summer.    Subjective      Review of Systems:  Review of Systems   Constitutional: Negative for appetite change, chills, fatigue, fever and unexpected weight change.   HENT: Positive for trouble swallowing. Negative for voice change.    Respiratory: Negative for cough, shortness of breath and wheezing.    Cardiovascular: Negative for chest pain, palpitations and leg swelling.   Gastrointestinal: Negative for abdominal pain, constipation, diarrhea, nausea and vomiting.   Endocrine: Negative for cold intolerance, heat intolerance, polydipsia, polyphagia and polyuria.   Neurological: Negative for tremors, syncope, weakness, numbness and headaches.       The following portions of the patient's history were reviewed and updated as appropriate: allergies, current medications, past family history, past medical history, past social history, past surgical history and problem list.    Objective     Vitals:    20 0810   BP: 128/80   BP Location: Right arm   Patient Position: Sitting   Cuff Size: Adult   Pulse: 80   Weight: 68.6 kg (151 lb 3.2 oz)   Height: 160 cm (63\")   PainSc: 0-No pain "     Body mass index is 26.78 kg/m².    Physical Exam  Vitals signs reviewed.   Constitutional:       General: She is not in acute distress.     Appearance: Normal appearance.   Neck:      Musculoskeletal: Normal range of motion and neck supple.      Thyroid: No thyroid mass, thyromegaly or thyroid tenderness.   Lymphadenopathy:      Cervical: No cervical adenopathy.   Neurological:      Mental Status: She is alert and oriented to person, place, and time.   Psychiatric:         Attention and Perception: Attention normal.         Mood and Affect: Mood normal.         TSH  Lab Results   Component Value Date    TSH 1.300 11/18/2019     FREE T4  Free T4   Date Value Ref Range Status   11/18/2019 1.46 0.93 - 1.70 ng/dL Final     CBC  Lab Results   Component Value Date    WBC 8.38 02/06/2020    RBC 4.26 02/06/2020    HGB 13.7 02/06/2020    HCT 39.6 02/06/2020    MCV 93.0 02/06/2020    MCH 32.2 02/06/2020    MCHC 34.6 02/06/2020    RDW 11.7 (L) 02/06/2020    RDWSD 39.2 02/06/2020    MPV 10.3 02/06/2020     02/06/2020       Current Outpatient Medications   Medication Instructions   • amLODIPine (NORVASC) 5 mg, Oral, Daily   • atorvastatin (LIPITOR) 20 mg, Oral, Daily   • benzonatate (TESSALON) 200 mg, Oral, 3 Times Daily PRN   • calcium carbonate (OS-SAAD) 600 mg, Oral, Daily   • letrozole (FEMARA) 2.5 mg, Oral, Daily   • losartan-hydrochlorothiazide (HYZAAR) 100-25 MG per tablet 1 tablet, Oral, Daily   • metoprolol tartrate (LOPRESSOR) 50 mg, Oral, 2 Times Daily   • omeprazole (PRILOSEC) 20 mg, Oral, Daily   • Synthroid 125 mcg, Oral, Daily   • venlafaxine XR (EFFEXOR-XR) 150 mg, Oral, Daily   • Vitamin D (Cholecalciferol) (CHOLECALCIFEROL) 800 Units, Oral, Daily       Assessment / Plan      Assessment & Plan:  1. Acquired hypothyroidism  Continue levothyroxine.  Will notify her of TSH results.  - TSH; Future    2. Nontoxic uninodular goiter  Thyroid not enlarged, no palpable nodules on exam.  Her last ultrasound in  2012 revealed dyshomogeneity without discrete nodule.  Continue to monitor with neck exams.    3. Mixed hyperlipidemia  Continue atorvastatin.  Will notify her of lab results.  - Lipid Panel; Future    4. Fasting hyperglycemia  Labs pending - will notify her of results.  Encouraged healthy dietary choices and regular exercise.  - Comprehensive Metabolic Panel; Future  - Hemoglobin A1c; Future       Patient examined with Dr. Jeanmarie Tinoco.    Return in about 1 year (around 12/2/2021) for Next scheduled follow up.    YOBANI Cardoso  12/02/2020

## 2020-12-06 RX ORDER — LEVOTHYROXINE SODIUM 112 MCG
112 TABLET ORAL DAILY
Qty: 90 TABLET | Refills: 1 | Status: SHIPPED | OUTPATIENT
Start: 2020-12-06 | End: 2020-12-11 | Stop reason: SDUPTHER

## 2020-12-06 RX ORDER — ATORVASTATIN CALCIUM 40 MG/1
40 TABLET, FILM COATED ORAL DAILY
Qty: 90 TABLET | Refills: 1 | Status: SHIPPED | OUTPATIENT
Start: 2020-12-06 | End: 2021-05-07

## 2021-01-26 ENCOUNTER — OFFICE VISIT (OUTPATIENT)
Dept: GYNECOLOGIC ONCOLOGY | Facility: CLINIC | Age: 66
End: 2021-01-26

## 2021-01-26 VITALS
BODY MASS INDEX: 26.93 KG/M2 | HEIGHT: 63 IN | RESPIRATION RATE: 17 BRPM | OXYGEN SATURATION: 94 % | WEIGHT: 152 LBS | SYSTOLIC BLOOD PRESSURE: 144 MMHG | TEMPERATURE: 98 F | HEART RATE: 76 BPM | DIASTOLIC BLOOD PRESSURE: 80 MMHG

## 2021-01-26 DIAGNOSIS — Z01.419 WELL WOMAN EXAM WITH ROUTINE GYNECOLOGICAL EXAM: Primary | ICD-10-CM

## 2021-01-26 PROCEDURE — 99396 PREV VISIT EST AGE 40-64: CPT | Performed by: NURSE PRACTITIONER

## 2021-01-26 NOTE — PROGRESS NOTES
GYN ONCOLOGY ANNUAL WELL WOMAN VISIT      Linn Bean  [id number]  1955      Subjective   Chief Complaint: Annual Exam (no complaints)        History of present illness:      Linn Bean is a 66 y.o. year old female who is here today for an annual exam. The patient has a history of fibroid uterus, s/p hysterectomy in , as well as a family history of breast and ovarian cancers. She has a personal history of breast and lung cancer, both stage 1 separate primaries, diagnosed in 2018. She is s/p breast lumpectomy followed by radiation and lung lobectomy. HRT was discontinued and she is on Armidex managed by her oncologist at [facility]. She uses Effexor for management of hot flashes.     She reports she is feeling very well today and has no complaints. She denies vaginal bleeding, pelvic pain, changes in bowel or bladder function, new or concerning lesions, and breast problems. All well woman screenings currently UTD.         Obstetric History:  OB History        2    Para   2    Term                AB        Living           SAB        TAB        Ectopic        Molar        Multiple        Live Births                   Menstrual History:     No LMP recorded (lmp unknown). Patient has had a hysterectomy.          The current medication list and allergy list were reviewed and reconciled.     Past Medical History, Past Surgical History, Social History, Family History have been reviewed and are without significant changes except as mentioned.    Health Maintenance:  Last mammogram was 2020. Last colonoscopy was 2020, with recommended follow-up in 5 year(s). Last DEXA was . Last pap smear was 2019, results were  normal PAP..        Review of Systems   Constitutional: Negative.    Gastrointestinal: Negative.    Genitourinary: Negative.    Skin:        Breast ROS negative       Objective   Physical Exam  Vital Signs: /80   Pulse 76   Temp 98 °F (36.7 °C) (Temporal)   Resp 17   " Ht 160 cm (62.99\")   Wt 68.9 kg (152 lb)   LMP  (LMP Unknown)   SpO2 94%   BMI 26.93 kg/m²   Vitals:    01/26/21 0833   PainSc: 0-No pain           General Appearance:  alert, cooperative, no apparent distress and appears stated age   Neurologic/Psychiatric: A&O x 3, gait steady, appropriate affect   Lungs:   Clear to auscultation bilaterally; respirations regular, even, and unlabored bilaterally   Heart:  Regular rate and rhythm, no murmurs appreciated   Breasts:  Symmetrical, no masses, no lesions, no nipple discharge and right breast scarring consistent with history of lumpectomy   Abdomen:   Soft, non-tender, non-distended and no organomegaly   Lymph nodes: No cervical, supraclavicular, inguinal or axillary adenopathy noted   Extremities: Normal, atraumatic; no clubbing, cyanosis, or edema    Skin: No rashes, ulcers, or suspicious lesions noted   Pelvic: External Genitalia  without lesions or skin changes  Vagina  is pink, moist, without lesions.   Vaginal Cuff  Female Vaginal Cuff: smooth, intact and without visible lesions  Uterus  surgically absent  Ovaries  surgically absent bilaterally and without palpable masses or fullness  Parametria  smooth  Rectovaginal  Female rectovaginal: confirms no masses or bleeding and Hemoccult negative     ECOG score: 0     Karnofsky score: 100       PHQ-9 Total Score: 0    Procedure Note:  No notes on file       Assessment and Plan:      Diagnoses and all orders for this visit:    1. Well woman exam with routine gynecological exam (Primary)         No pap smear needed today.     She was encouraged to get yearly mammograms.  She should report any palpable breast lump(s) or skin changes regardless of mammographic findings.  I explained to Linn that notification regarding her mammogram results will come from the center performing the study.  Our office will not be routinely calling with mammogram results.  It is her responsibility to make sure that the results from the " mammogram are communicated to her by the breast center.  If she has any questions about the results, she is welcome to call our office anytime.    Repeat colonoscopy in 2025 or sooner if new problems.     Repeat DEXA at age 67. Continue Vitamin D and Calcium supplementation while on Arimidex.      Continue Arimidex and Effexor as prescribed by oncologist and keep regular follow-ups.        Pain assessment was performed today as a part of patient’s care. For patients with pain related to surgery, gynecologic malignancy or cancer treatment, the plan is as noted in the assessment/plan.  For patients with pain not related to these issues, they are to seek any further needed care from a more appropriate provider, such as PCP.      Follow-up:     Return to clinic in 1 year for Annual exam.      Electronically signed by DAYAMI Mullins on 01/26/21 at 09:35 EST

## 2021-04-19 ENCOUNTER — OFFICE VISIT (OUTPATIENT)
Dept: CARDIOLOGY | Facility: CLINIC | Age: 66
End: 2021-04-19

## 2021-04-19 VITALS
HEIGHT: 63 IN | OXYGEN SATURATION: 96 % | WEIGHT: 151.4 LBS | HEART RATE: 86 BPM | SYSTOLIC BLOOD PRESSURE: 132 MMHG | BODY MASS INDEX: 26.82 KG/M2 | DIASTOLIC BLOOD PRESSURE: 80 MMHG

## 2021-04-19 DIAGNOSIS — E78.2 MIXED HYPERLIPIDEMIA: ICD-10-CM

## 2021-04-19 DIAGNOSIS — I10 ESSENTIAL HYPERTENSION: Primary | ICD-10-CM

## 2021-04-19 PROCEDURE — 99213 OFFICE O/P EST LOW 20 MIN: CPT | Performed by: INTERNAL MEDICINE

## 2021-04-19 RX ORDER — METOPROLOL SUCCINATE 50 MG/1
50 TABLET, EXTENDED RELEASE ORAL DAILY
Qty: 30 TABLET | Refills: 11 | Status: SHIPPED | OUTPATIENT
Start: 2021-04-19 | End: 2022-12-07

## 2021-04-19 NOTE — PROGRESS NOTES
[facility] at [facility]  Office visit  Linn Bean  1955  [phone]  [phone] (work)    VISIT DATE:  4/19/2021    PCP: Pearl Salazar MD  [address] [address]    CC:  Chief Complaint   Patient presents with   • Hypertension   • Shortness of Breath       Previous cardiac studies and procedures:  November 2020 transthoracic echocardiogram  · Left ventricular ejection fraction appears to be 61 - 65%. Left ventricular systolic function is normal.  · Left ventricular diastolic function is consistent with (grade Ia w/high LAP) impaired relaxation.    ASSESSMENT:   Diagnosis Plan   1. Essential hypertension     2. Mixed hyperlipidemia         PLAN:  Hypertension, essential: Goal less than 130/80 mmHg.  Currently with reasonable control.  Switching metoprolol tartrate to metoprolol succinate in order to help with medication adherence..  Dietary and lifestyle modifications to achieve weight loss, recommended consistent exercise.  Limiting sodium intake.  Potential changes in medical therapy if required in the future would be either to increase amlodipine to 10 mg p.o. daily, switch metoprolol to a beta-blocker which is more efficacious with afterload control such as carvedilol, bisoprolol, or nebivolol, and potential addition of Aldactone.     Hyperlipidemia: Well-controlled.  Continue current medical therapy.  Goal LDL less than 100.    Subjective  Normal assessment: Stable functional capacity.  Denies chest pain, dyspnea exertion or palpitations.  Blood pressures running less than 135/80 mmHg.  Frequently forgetting her nighttime dose of metoprolol tartrate.    Initial evaluation: 65-year-old female with history of hypertension, dyslipidemia, remote history of smoking presenting with episodes of shortness of breath with exertion and recent worsening in blood pressure control.  She has had high blood pressure for approximately 2 years, onset shortly after being  "treated for lung nodule and breast cancer requiring lumpectomy and radiation therapy.  Follow-up imaging has been stable and negative for recurrent disease.  She denies chest discomfort.  Does report shortness of breath with such activities as going up a flight of stairs.  Intermittent brief palpitations associated with anxiety.  Developed cough on ACE inhibitor.  Compliant with current medical therapy.  Feels like her blood pressure has begun to improved after initiation of metoprolol about 4 weeks ago.  Blood pressures are predominantly running less than 130/85 mmHg.  She will have intermittent spikes in her systolic blood pressure up to 160 mmHg with diastolic blood pressures as high as 99 mmHg.  Her lowest recorded blood pressure over the previous 4 weeks was 85/66 mmHg.  She is not exercising on a regular basis.  Was diagnosed with mild sleep apnea 2 to 3 years ago, she did not tolerate CPAP but is using a dental appliance.  Her  does report that she snores prominently.  Only 1 caffeinated beverage per day, less than 2 glasses of wine per day.    PHYSICAL EXAMINATION:  Vitals:    04/19/21 1153   BP: 132/80   BP Location: Right arm   Patient Position: Sitting   Pulse: 86   SpO2: 96%   Weight: 68.7 kg (151 lb 6.4 oz)   Height: 160 cm (63\")     General Appearance:    Alert, cooperative, no distress, appears stated age   Head:    Normocephalic, without obvious abnormality, atraumatic   Eyes:    conjunctiva/corneas clear   Nose:   Nares normal, septum midline, mucosa normal, no drainage   Throat:   Lips, teeth and gums normal   Neck:   Supple, symmetrical, trachea midline, no carotid    bruit or JVD   Lungs:     Clear to auscultation bilaterally, respirations unlabored   Chest Wall:    No tenderness or deformity    Heart:    Regular rate and rhythm, S1 and S2 normal, 2/6 early peaking systolic murmur right upper sternal border, rub   or gallop, normal carotid impulse bilaterally without bruit.   Abdomen:     " Soft, non-tender   Extremities:   Extremities normal, atraumatic, no cyanosis or edema   Pulses:   2+ and symmetric all extremities   Skin:   Skin color, texture, turgor normal, no rashes or lesions       Diagnostic Data:  Procedures  Lab Results   Component Value Date    TRIG 119 12/02/2020    HDL 80 (H) 12/02/2020     Lab Results   Component Value Date    GLUCOSE 82 12/02/2020    BUN 13 12/02/2020    CREATININE 0.63 12/02/2020     12/02/2020    K 4.1 12/02/2020     12/02/2020    CO2 28.6 12/02/2020     Lab Results   Component Value Date    HGBA1C 6.14 (H) 12/02/2020     Lab Results   Component Value Date    WBC 8.38 02/06/2020    HGB 13.7 02/06/2020    HCT 39.6 02/06/2020     02/06/2020       Allergies  Allergies   Allergen Reactions   • Ace Inhibitors Cough   • Codeine Itching       Current Medications    Current Outpatient Medications:   •  amLODIPine (NORVASC) 5 MG tablet, Take 1 tablet by mouth Daily., Disp: 90 tablet, Rfl: 3  •  atorvastatin (Lipitor) 40 MG tablet, Take 1 tablet by mouth Daily., Disp: 90 tablet, Rfl: 1  •  calcium carbonate (OS-SAAD) 600 MG tablet, Take 600 mg by mouth Daily., Disp: , Rfl:   •  letrozole (FEMARA) 2.5 MG tablet, Take 2.5 mg by mouth Daily., Disp: , Rfl:   •  levothyroxine (Synthroid) 112 MCG tablet, Take 1 tablet by mouth Daily., Disp: 90 tablet, Rfl: 1  •  losartan-hydrochlorothiazide (HYZAAR) 100-25 MG per tablet, Take 1 tablet by mouth Daily., Disp: 90 tablet, Rfl: 3  •  omeprazole (priLOSEC) 20 MG capsule, Take 1 capsule by mouth Daily., Disp: 90 capsule, Rfl: 3  •  venlafaxine XR (EFFEXOR-XR) 150 MG 24 hr capsule, Take 1 capsule by mouth Daily., Disp: 90 capsule, Rfl: 3  •  Vitamin D, Cholecalciferol, (CHOLECALCIFEROL) 10 MCG (400 UNIT) tablet, Take 800 Units by mouth Daily., Disp: , Rfl:   •  metoprolol succinate XL (TOPROL-XL) 50 MG 24 hr tablet, Take 1 tablet by mouth Daily., Disp: 30 tablet, Rfl: 11          ROS  ROS      SOCIAL HX  Social History      Socioeconomic History   • Marital status:      Spouse name: Mahesh   • Number of children: 2   • Years of education: College   • Highest education level: Not on file   Tobacco Use   • Smoking status: Former Smoker     Packs/day: 2.00     Types: Cigarettes     Quit date:      Years since quittin.3   • Smokeless tobacco: Never Used   • Tobacco comment: 2 packs per week   Substance and Sexual Activity   • Alcohol use: Yes     Alcohol/week: 1.0 standard drinks     Types: 1 Glasses of wine per week     Comment: occas   • Drug use: No   • Sexual activity: Yes     Partners: Male       FAMILY HX  Family History   Problem Relation Age of Onset   • Asthma Mother    • Ovarian cancer Mother    • Hypothyroidism Mother    • Heart failure Father    • Prostate cancer Father    • Heart attack Father    • Breast cancer Sister              Vernon Bowman III, MD, FACC

## 2021-05-07 ENCOUNTER — TELEPHONE (OUTPATIENT)
Dept: ENDOCRINOLOGY | Facility: CLINIC | Age: 66
End: 2021-05-07

## 2021-05-07 RX ORDER — ATORVASTATIN CALCIUM 40 MG/1
40 TABLET, FILM COATED ORAL DAILY
Qty: 90 TABLET | Refills: 1 | Status: SHIPPED | OUTPATIENT
Start: 2021-05-07 | End: 2023-01-24

## 2021-05-07 NOTE — TELEPHONE ENCOUNTER
When I was refilling her prescription for atorvastatin, I noticed that she had not rescheduled her next appointment.  I changed her dose of levothyroxine in December and sent a message for her to follow up after 3 months.  Please see if we can get her scheduled in the next couple of months.  Thanks.

## 2021-06-28 ENCOUNTER — TELEPHONE (OUTPATIENT)
Dept: ENDOCRINOLOGY | Facility: CLINIC | Age: 66
End: 2021-06-28

## 2021-06-28 NOTE — TELEPHONE ENCOUNTER
Pt called to state that she did lab work with her PCP and the results are in her chart. Has an appt on 6-30-21, wanted to know if the results are normal does she still need to keep her appt.. Would like a call back

## 2021-06-28 NOTE — TELEPHONE ENCOUNTER
I am okay with her following up in December if her TSH was normal.  I do not have any recent lab results on her though.  Please call for copy from her PCPs office.  Thank you.

## 2021-06-29 NOTE — TELEPHONE ENCOUNTER
I reviewed labs.  Her TSH was mildly low at 0.22, but has improved after dose decrease.  Let's continue current dose of levothyroxine 112 mcg daily and okay to follow-up in 6 months.  Please have her call if she begins having symptoms of over-replacement such as heart palpitations, heart racing, heat intolerance, sweats, loose stools.  Thank you.

## 2022-01-10 ENCOUNTER — HOSPITAL ENCOUNTER (OUTPATIENT)
Dept: GENERAL RADIOLOGY | Facility: HOSPITAL | Age: 67
Discharge: HOME | End: 2022-01-10
Admitting: NURSE PRACTITIONER

## 2022-01-10 ENCOUNTER — TRANSCRIBE ORDERS (OUTPATIENT)
Dept: ADMINISTRATIVE | Facility: HOSPITAL | Age: 67
End: 2022-01-10

## 2022-01-10 DIAGNOSIS — R05.9 COUGH: Primary | ICD-10-CM

## 2022-01-10 PROCEDURE — 71046 X-RAY EXAM CHEST 2 VIEWS: CPT

## 2022-01-25 ENCOUNTER — OFFICE VISIT (OUTPATIENT)
Dept: GYNECOLOGIC ONCOLOGY | Facility: CLINIC | Age: 67
End: 2022-01-25

## 2022-01-25 VITALS
WEIGHT: 159 LBS | OXYGEN SATURATION: 96 % | RESPIRATION RATE: 16 BRPM | TEMPERATURE: 97.1 F | HEIGHT: 62 IN | SYSTOLIC BLOOD PRESSURE: 128 MMHG | DIASTOLIC BLOOD PRESSURE: 80 MMHG | BODY MASS INDEX: 29.26 KG/M2 | HEART RATE: 80 BPM

## 2022-01-25 DIAGNOSIS — Z01.419 WELL WOMAN EXAM WITH ROUTINE GYNECOLOGICAL EXAM: Primary | ICD-10-CM

## 2022-01-25 DIAGNOSIS — Z85.118 HISTORY OF LUNG CANCER: ICD-10-CM

## 2022-01-25 DIAGNOSIS — Z85.3 HISTORY OF BREAST CANCER: ICD-10-CM

## 2022-01-25 PROCEDURE — G0101 CA SCREEN;PELVIC/BREAST EXAM: HCPCS | Performed by: NURSE PRACTITIONER

## 2022-01-25 PROCEDURE — 3015F CERV CANCER SCREEN DOCD: CPT | Performed by: NURSE PRACTITIONER

## 2022-01-25 RX ORDER — EZETIMIBE 10 MG/1
1 TABLET ORAL DAILY
COMMUNITY
Start: 2021-11-01

## 2022-01-25 NOTE — PROGRESS NOTES
GYN ONCOLOGY ANNUAL WELL WOMAN VISIT      Linn Bean  [id number]  1955      Subjective   Chief Complaint: Annual Exam (No Breast/GYN Complaints)        History of present illness:      Linn Bean is a 67 y.o. year old female who is here today for an annual exam. The patient has a history of fibroid uterus, s/p hysterectomy in , as well as a family history of breast and ovarian cancers. She has a personal history of breast and lung cancer, both stage 1 separate primaries, diagnosed in 2018. She is s/p breast lumpectomy followed by radiation and lung lobectomy. HRT was discontinued and she is on Armidex managed by her oncologist at [facility]. She continues Effexor for management of hot flashes.     She reports she is feeling very well today and has no complaints. She denies vaginal bleeding, pelvic pain, changes in bowel or bladder function, new or concerning lesions, and breast problems. Colonoscopy UTD. Mammogram and DEXA completed at [place] last year.         Obstetric History:  OB History        2    Para   2    Term                AB        Living           SAB        IAB        Ectopic        Molar        Multiple        Live Births                   Menstrual History:     No LMP recorded (lmp unknown). Patient has had a hysterectomy.          The current medication list and allergy list were reviewed and reconciled.     Past Medical History, Past Surgical History, Social History, Family History have been reviewed and are without significant changes except as mentioned.    Health Maintenance:  Last mammogram was 2020. Last colonoscopy was 2020, with recommended follow-up in 5 year(s). Last DEXA was . Last pap smear was 2019, results were  normal PAP..        Review of Systems   Constitutional: Negative.    Gastrointestinal: Negative.    Genitourinary: Negative.    Skin:        Breast ROS negative       Objective   Physical Exam  Vital Signs: /80 Comment: STEFFANY   "Pulse 80   Temp 97.1 °F (36.2 °C) (Infrared)   Resp 16   Ht 156.2 cm (61.5\")   Wt 72.1 kg (159 lb)   LMP  (LMP Unknown)   SpO2 96% Comment: RA  BMI 29.56 kg/m²   Vitals:    01/25/22 1342   PainSc: 0-No pain           General Appearance:  alert, cooperative, no apparent distress and appears stated age   Neurologic/Psychiatric: A&O x 3, gait steady, appropriate affect   Lungs:   Clear to auscultation bilaterally; respirations regular, even, and unlabored bilaterally   Heart:  Regular rate and rhythm, no murmurs appreciated   Breasts:  no masses, no lesions, no nipple discharge and right breast scarring consistent with history of lumpectomy   Abdomen:   Soft, non-tender, non-distended and no organomegaly   Lymph nodes: No cervical, supraclavicular, inguinal or axillary adenopathy noted   Extremities: Normal, atraumatic; no clubbing, cyanosis, or edema    Skin: No rashes, ulcers, or suspicious lesions noted   Pelvic: External Genitalia  without lesions or skin changes  Vagina  is pink, moist, without lesions.   Vaginal Cuff  Female Vaginal Cuff: smooth, intact and without visible lesions. Pap obtained  Uterus  surgically absent  Ovaries  surgically absent bilaterally and without palpable masses or fullness  Parametria  smooth  Rectovaginal  Female rectovaginal: confirms no masses or bleeding and Hemoccult negative     ECOG score: 0             PHQ-9 Total Score: 0    Procedure Note:  No notes on file       Assessment and Plan:      Diagnoses and all orders for this visit:    1. Well woman exam with routine gynecological exam (Primary)  -     Liquid-based Pap Smear, Screening; Future    2. History of lung cancer    3. History of breast cancer        Pap was done today. If she does not receive the results of the Pap within 2 weeks  time, she was instructed to call to find out the results.      She was encouraged to get yearly mammograms.  She should report any palpable breast lump(s) or skin changes regardless of " mammographic findings.  I explained to Linn that notification regarding her mammogram results will come from the center performing the study.  Our office will not be routinely calling with mammogram results.  It is her responsibility to make sure that the results from the mammogram are communicated to her by the breast center.  If she has any questions about the results, she is welcome to call our office anytime.    Repeat colonoscopy in 2025 or sooner if new problems.     Continue Vitamin D and Calcium supplementation while on Arimidex.      Continue Arimidex and Effexor as prescribed by oncologist and keep regular follow-ups.        Pain assessment was performed today as a part of patient’s care. For patients with pain related to surgery, gynecologic malignancy or cancer treatment, the plan is as noted in the assessment/plan.  For patients with pain not related to these issues, they are to seek any further needed care from a more appropriate provider, such as PCP.      Follow-up:     Return to clinic in 1 year for Annual exam.      Electronically signed by DAYAMI Mullins on 01/25/22 at 14:20 EST

## 2022-12-07 RX ORDER — METOPROLOL SUCCINATE 50 MG/1
50 TABLET, EXTENDED RELEASE ORAL DAILY
Qty: 90 TABLET | Refills: 0 | Status: SHIPPED | OUTPATIENT
Start: 2022-12-07

## 2023-01-24 ENCOUNTER — OFFICE VISIT (OUTPATIENT)
Dept: NEUROLOGY | Facility: CLINIC | Age: 68
End: 2023-01-24
Payer: MEDICARE

## 2023-01-24 VITALS
HEIGHT: 61 IN | HEART RATE: 88 BPM | WEIGHT: 151.2 LBS | SYSTOLIC BLOOD PRESSURE: 120 MMHG | TEMPERATURE: 96.9 F | BODY MASS INDEX: 28.55 KG/M2 | OXYGEN SATURATION: 98 % | DIASTOLIC BLOOD PRESSURE: 70 MMHG

## 2023-01-24 DIAGNOSIS — Z91.81 HISTORY OF FALL: Primary | ICD-10-CM

## 2023-01-24 DIAGNOSIS — R25.1 TREMOR: ICD-10-CM

## 2023-01-24 PROBLEM — E78.5 DYSLIPIDEMIA: Status: ACTIVE | Noted: 2022-12-20

## 2023-01-24 PROBLEM — M54.9 BACK PAIN: Status: ACTIVE | Noted: 2022-12-20

## 2023-01-24 PROBLEM — Z20.822 SUSPECTED SEVERE ACUTE RESPIRATORY SYNDROME CORONAVIRUS 2 (SARS-COV-2) INFECTION: Status: ACTIVE | Noted: 2021-12-30

## 2023-01-24 PROBLEM — C34.32 PRIMARY MALIGNANT NEOPLASM OF BRONCHUS OF LEFT LOWER LOBE: Status: ACTIVE | Noted: 2022-11-26

## 2023-01-24 PROBLEM — R68.89 INFLUENZA-LIKE SYMPTOMS: Status: ACTIVE | Noted: 2021-12-30

## 2023-01-24 PROBLEM — M19.042 OSTEOARTHRITIS OF HANDS, BILATERAL: Status: ACTIVE | Noted: 2022-12-20

## 2023-01-24 PROBLEM — C34.90 LUNG CANCER: Status: ACTIVE | Noted: 2022-12-20

## 2023-01-24 PROBLEM — I10 HTN (HYPERTENSION): Status: ACTIVE | Noted: 2022-12-20

## 2023-01-24 PROBLEM — Z98.890 HISTORY OF LUMPECTOMY: Status: ACTIVE | Noted: 2022-11-08

## 2023-01-24 PROBLEM — R01.2: Status: ACTIVE | Noted: 2022-11-08

## 2023-01-24 PROBLEM — R00.2 PALPITATION: Status: ACTIVE | Noted: 2022-11-08

## 2023-01-24 PROBLEM — M19.041 OSTEOARTHRITIS OF HANDS, BILATERAL: Status: ACTIVE | Noted: 2022-12-20

## 2023-01-24 PROBLEM — R06.02 SHORTNESS OF BREATH: Status: ACTIVE | Noted: 2022-11-08

## 2023-01-24 PROBLEM — E04.1 NONTOXIC UNINODULAR GOITER: Status: ACTIVE | Noted: 2022-12-20

## 2023-01-24 PROBLEM — I34.0 NONRHEUMATIC MITRAL VALVE REGURGITATION: Status: ACTIVE | Noted: 2022-11-08

## 2023-01-24 PROBLEM — Z97.3 WEARS GLASSES: Status: ACTIVE | Noted: 2022-12-20

## 2023-01-24 PROBLEM — Z86.69 HISTORY OF OBSTRUCTIVE SLEEP APNEA: Status: ACTIVE | Noted: 2022-12-20

## 2023-01-24 PROBLEM — E03.9 HYPOTHYROIDISM: Status: ACTIVE | Noted: 2022-12-20

## 2023-01-24 PROBLEM — J18.9 PNEUMONIA: Status: ACTIVE | Noted: 2022-12-20

## 2023-01-24 PROBLEM — M50.30 DDD (DEGENERATIVE DISC DISEASE), CERVICAL: Status: ACTIVE | Noted: 2022-12-20

## 2023-01-24 PROBLEM — Z88.9 PREDISPOSITION TO ALLERGIC REACTION: Status: ACTIVE | Noted: 2022-12-20

## 2023-01-24 PROBLEM — Z90.2 HISTORY OF LOBECTOMY OF LUNG: Status: ACTIVE | Noted: 2022-11-08

## 2023-01-24 PROBLEM — K44.9 HIATAL HERNIA: Status: ACTIVE | Noted: 2022-12-20

## 2023-01-24 PROBLEM — I67.82 BRAIN ISCHEMIA: Status: ACTIVE | Noted: 2022-12-20

## 2023-01-24 PROBLEM — F41.1 GAD (GENERALIZED ANXIETY DISORDER): Status: ACTIVE | Noted: 2022-12-20

## 2023-01-24 PROBLEM — F41.9 ANXIETY: Status: ACTIVE | Noted: 2022-12-20

## 2023-01-24 PROBLEM — Z00.00 ENCOUNTER FOR GENERAL ADULT MEDICAL EXAMINATION WITHOUT ABNORMAL FINDINGS: Status: ACTIVE | Noted: 2022-12-20

## 2023-01-24 PROBLEM — J32.9 SINUSITIS: Status: ACTIVE | Noted: 2022-12-20

## 2023-01-24 PROBLEM — J06.9 UPPER RESPIRATORY TRACT INFECTION: Status: ACTIVE | Noted: 2021-12-30

## 2023-01-24 PROBLEM — D05.11 INTRADUCTAL CARCINOMA IN SITU OF RIGHT BREAST: Status: ACTIVE | Noted: 2022-11-26

## 2023-01-24 PROBLEM — D25.9 UTERINE FIBROID: Status: ACTIVE | Noted: 2022-12-20

## 2023-01-24 PROCEDURE — 99214 OFFICE O/P EST MOD 30 MIN: CPT | Performed by: NURSE PRACTITIONER

## 2023-01-24 RX ORDER — ROSUVASTATIN CALCIUM 20 MG/1
TABLET, COATED ORAL
COMMUNITY
Start: 2022-12-07

## 2023-01-24 RX ORDER — MULTIPLE VITAMINS W/ MINERALS TAB 9MG-400MCG
1 TAB ORAL DAILY
COMMUNITY

## 2023-01-24 RX ORDER — IBUPROFEN 200 MG
200 TABLET ORAL
COMMUNITY

## 2023-01-24 RX ORDER — ALBUTEROL SULFATE 90 UG/1
2 AEROSOL, METERED RESPIRATORY (INHALATION)
COMMUNITY
Start: 2023-01-09 | End: 2024-01-10

## 2023-01-24 RX ORDER — ACETAMINOPHEN 325 MG/1
650 TABLET ORAL
COMMUNITY

## 2023-01-24 RX ORDER — ASPIRIN 81 MG/1
81 TABLET ORAL DAILY
COMMUNITY

## 2023-01-24 RX ORDER — PREDNISONE 20 MG/1
TABLET ORAL
COMMUNITY
Start: 2022-10-21

## 2023-01-24 NOTE — PROGRESS NOTES
Neuro Office Visit      Encounter Date: 2023   Patient Name: Linn Bean  : 1955   MRN: [id number]   PCP:  Lissy Keenan MD     Chief Complaint:    Chief Complaint   Patient presents with   • Abnormal MRI       History of Present Illness: Linn Bean is a 68 y.o. female who is here today in Neurology for abnormal MRI.    Accompanied by her daughter.  She has a history of breast and lung cancer and at her recent visit with her primary care physician was noted to have ischemic changes on MRI of the brain.  She also had a history of falls.    In May she had a fall in which she injured her right hip and piriformis.  She also fell in her kitchen after she stepped her toe.  States that after her daughter bought her some new shoes she has been much better and has had no further falls.  She thinks that the falls are related to clumsiness.    She denies any dizziness or lightheadedness.    Occasional headache which is quickly relieved with Advil.  Often these are related to lack of sleep.    She denies numbness or tingling, weakness in extremities, amaurosis fugax.    She does have sleep apnea and is on CPAP which has helped her sleep tremendously.    MRI of the brain was reviewed by me and Dr. Quiroz.  She does have some hyperintensities which are most likely related to hypertension.    She does note a mild tremor of her hands that is occasional and not bothersome.    Subjective        Past Medical History:   Past Medical History:   Diagnosis Date   • Adenocarcinoma of lung, stage 1, left (HCC) 2018    LLL   • CTS (carpal tunnel syndrome) 2018   • DDD (degenerative disc disease), cervical    • Ductal carcinoma in situ (DCIS) of right breast 2018   • LUCILLE (generalized anxiety disorder)    • HLD (hyperlipidemia)    • HTN (hypertension)    • Hypothyroidism    • Mixed hyperlipidemia    • Non-toxic uninodular goiter    • Nontoxic uninodular goiter    • Osteoarthritis of hands, bilateral    • Sleep  apnea 2015    Severe - 44/hour now have a CPAP since    • Stroke (HCC) 2022    Brain MRI results indicate   • TIA (transient ischemic attack) 2022   • Vitamin D deficiency        Past Surgical History:   Past Surgical History:   Procedure Laterality Date   • BREAST LUMPECTOMY Left    • BREAST LUMPECTOMY Right 2018   • BUNIONECTOMY     • COLONOSCOPY     • EYE PTOSIS REPAIR Bilateral 2017   • EYE SURGERY Right 10/2021   • HYSTERECTOMY     • KNEE ARTHROSCOPY Left    • LUNG LOBECTOMY Left 2018    LLL   • REPLACEMENT TOTAL KNEE Left    • THORACOTOMY Left    • TONSILLECTOMY     • TUBAL ABDOMINAL LIGATION         Family History:   Family History   Problem Relation Age of Onset   • Asthma Mother    • Ovarian cancer Mother    • Hypothyroidism Mother    • Heart failure Father    • Prostate cancer Father    • Heart attack Father    • Breast cancer Sister    • Migraines Sister         in her teens and 20's       Social History:   Social History     Socioeconomic History   • Marital status:      Spouse name: Mahesh   • Number of children: 2   • Years of education: College   Tobacco Use   • Smoking status: Former     Packs/day: 2.00     Years: 10.00     Pack years: 20.00     Types: Cigarettes     Start date: 1978     Quit date: 1988     Years since quittin.7   • Smokeless tobacco: Never   • Tobacco comments:     Mostly social smoker. one pack on weekend, may 1/2 pack during week   Vaping Use   • Vaping Use: Never used   Substance and Sexual Activity   • Alcohol use: Yes     Alcohol/week: 12.0 standard drinks     Types: 10 Glasses of wine, 2 Drinks containing 0.5 oz of alcohol per week     Comment: occas   • Drug use: No   • Sexual activity: Not Currently     Partners: Male     Birth control/protection: Post-menopausal       Medications:     Current Outpatient Medications:   •  acetaminophen (TYLENOL) 325 MG tablet, Take 650 mg by mouth., Disp: , Rfl:   •  albuterol  sulfate  (90 Base) MCG/ACT inhaler, Inhale 2 puffs., Disp: , Rfl:   •  amLODIPine (NORVASC) 5 MG tablet, Take 1 tablet by mouth Daily., Disp: 90 tablet, Rfl: 3  •  aspirin 81 MG EC tablet, Take 81 mg by mouth Daily., Disp: , Rfl:   •  calcium carbonate (OS-SAAD) 600 MG tablet, Take 600 mg by mouth Daily., Disp: , Rfl:   •  ezetimibe (ZETIA) 10 MG tablet, Take 1 tablet by mouth Daily., Disp: , Rfl:   •  ibuprofen (ADVIL,MOTRIN) 200 MG tablet, Take 200 mg by mouth., Disp: , Rfl:   •  letrozole (FEMARA) 2.5 MG tablet, Take 2.5 mg by mouth Daily., Disp: , Rfl:   •  levothyroxine (Synthroid) 112 MCG tablet, Take 1 tablet by mouth Daily., Disp: 90 tablet, Rfl: 1  •  losartan-hydrochlorothiazide (HYZAAR) 100-25 MG per tablet, Take 1 tablet by mouth Daily., Disp: 90 tablet, Rfl: 3  •  metoprolol succinate XL (TOPROL-XL) 50 MG 24 hr tablet, Take 1 tablet by mouth Daily. Need f/u appt for further refills., Disp: 90 tablet, Rfl: 0  •  multivitamin with minerals tablet tablet, Take 1 tablet by mouth Daily., Disp: , Rfl:   •  omeprazole (priLOSEC) 20 MG capsule, Take 1 capsule by mouth Daily., Disp: 90 capsule, Rfl: 3  •  predniSONE (DELTASONE) 20 MG tablet, , Disp: , Rfl:   •  Probiotic Product (PROBIOTIC DAILY PO), Take  by mouth., Disp: , Rfl:   •  rosuvastatin (CRESTOR) 20 MG tablet, Crestor 20 mg tablet  Take 1 tablet every day by oral route., Disp: , Rfl:   •  venlafaxine XR (EFFEXOR-XR) 150 MG 24 hr capsule, Take 1 capsule by mouth Daily., Disp: 90 capsule, Rfl: 3  •  Vitamin D, Cholecalciferol, (CHOLECALCIFEROL) 10 MCG (400 UNIT) tablet, Take 800 Units by mouth Daily., Disp: , Rfl:     Allergies:   Allergies   Allergen Reactions   • Ace Inhibitors Cough   • Codeine Itching       PHQ-9 Total Score:     STEADI Fall Risk Assessment was completed, and patient is at LOW risk for falls.Assessment completed on:1/24/2023    Objective     Physical Exam:   Physical Exam  Vitals reviewed.   Eyes:      Extraocular Movements:  EOM normal.      Pupils: Pupils are equal, round, and reactive to light.   Neurological:      Mental Status: She is oriented to person, place, and time.      Gait: Gait is intact.      Deep Tendon Reflexes:      Reflex Scores:       Tricep reflexes are 2+ on the right side and 2+ on the left side.       Bicep reflexes are 2+ on the right side and 2+ on the left side.       Brachioradialis reflexes are 2+ on the right side and 2+ on the left side.       Patellar reflexes are 2+ on the right side and 2+ on the left side.       Achilles reflexes are 2+ on the right side and 2+ on the left side.  Psychiatric:         Speech: Speech normal.         Neurologic Exam     Mental Status   Oriented to person, place, and time.   Attention: normal. Concentration: normal.   Speech: speech is normal   Level of consciousness: alert  Knowledge: good.     Cranial Nerves     CN II   Visual fields full to confrontation.     CN III, IV, VI   Pupils are equal, round, and reactive to light.  Extraocular motions are normal.   CN III: no CN III palsy  CN VI: no CN VI palsy  Nystagmus: none   Diplopia: none    CN V   Facial sensation intact.   Right facial sensation deficit: none  Left facial sensation deficit: none    CN VII   Facial expression full, symmetric.   Right facial weakness: none  Left facial weakness: none    CN VIII   CN VIII normal.     CN IX, X   CN IX normal.   CN X normal.     CN XI   CN XI normal.   Right sternocleidomastoid strength: normal  Left sternocleidomastoid strength: normal  Right trapezius strength: normal  Left trapezius strength: normal    CN XII   CN XII normal.   Tongue: not atrophic  Fasciculations: absent  Tongue deviation: none    Motor Exam     Strength   Right neck flexion: 5/5  Left neck flexion: 5/5  Right neck extension: 5/5  Left neck extension: 5/5  Right deltoid: 5/5  Left deltoid: 5/5  Right biceps: 5/5  Left biceps: 5/5  Right triceps: 5/5  Left triceps: 5/5  Right wrist flexion: 5/5  Left wrist  "flexion: 5/5  Right wrist extension: 5/5  Left wrist extension: 5/5  Right interossei: 5/5  Left interossei: 5/5  Right abdominals: 5/5  Left abdominals: 5/5  Right iliopsoas: 5/5  Left iliopsoas: 5/5  Right quadriceps: 5/5  Left quadriceps: 5/5  Right hamstrin/5  Left hamstrin/5  Right glutei: 5/5  Left glutei: 5/5  Right anterior tibial: 5/5  Left anterior tibial: 5/5  Right posterior tibial: 5/5  Left posterior tibial: 5/5  Right peroneal: 5/5  Left peroneal: 5/5  Right gastroc: 5/5  Left gastroc: 5/5    Sensory Exam   Light touch normal.     Gait, Coordination, and Reflexes     Gait  Gait: normal    Tremor   Resting tremor: present  Intention tremor: absent  Action tremor: absent    Reflexes   Right brachioradialis: 2+  Left brachioradialis: 2+  Right biceps: 2+  Left biceps: 2+  Right triceps: 2+  Left triceps: 2+  Right patellar: 2+  Left patellar: 2+  Right achilles: 2+  Left achilles: 2+  Right : 2+  Left : 2+       Vital Signs:   Vitals:    23 0835   BP: 120/70   Pulse: 88   Temp: 96.9 °F (36.1 °C)   SpO2: 98%   Weight: 68.6 kg (151 lb 3.2 oz)   Height: 156.2 cm (61.5\")     Body mass index is 28.11 kg/m².     Results:   Imaging:   No Images in the past 120 days found..     Labs:    No results found for: CMP, PROTEIN, ANTIMOGAB, [id number], JCVRESULT, QUANTTBGOLD, CBCDIF, IGGALBSER     Assessment / Plan      Assessment/Plan:   Diagnoses and all orders for this visit:    1. History of fall (Primary)    2. Tremor           Patient Education:   We discussed that her MRI is normal with the exception of some mild changes that appear to be related to hypertension.  She did have a long period of time where her blood pressure was poorly controlled.  She was very relieved to know that there are no significant findings on her MRI.  Her falls have pretty much disappeared since she started wearing better footwear.  She does have a mild tremor that is not bothersome and we discussed that there are " medications that could help but at this point she probably does not have enough of a tremor to require medication.  She will return on an as-needed basis.  Reviewed medications, potential side effects and signs and symptoms to report. Discussed risk versus benefits of treatment plan with patient and/or family-including medications, labs and radiology that may be ordered. Addressed questions and concerns during visit. Patient and/or family verbalized understanding and agree with plan. Instructed to call the office with any questions and report to ER with any life-threatening symptoms.     Follow Up:   Return if symptoms worsen or fail to improve.    I spent 30  minutes face to face with the patient. I personally spent 50 percent of this time counseling and discussing diagnosis, diagnostic testing, driving, treatment options and management .       During this visit the following were done:  Labs Reviewed []    Labs Ordered []    Radiology Reports Reviewed []    Radiology Ordered []    PCP Records Reviewed []    Referring Provider Records Reviewed [x]    ER Records Reviewed []    Hospital Records Reviewed []    History Obtained From Family []    Radiology Images Reviewed [x]    Other Reviewed [x]    Records Requested []      DAYAMI Bob  [facility]  [address]  [place] [address] [phone]

## 2023-01-26 ENCOUNTER — PATIENT ROUNDING (BHMG ONLY) (OUTPATIENT)
Dept: NEUROLOGY | Facility: CLINIC | Age: 68
End: 2023-01-26
Payer: MEDICARE

## 2023-01-26 NOTE — PROGRESS NOTES
January 26, 2023    Hello, may I speak with Linn Bean?    My name is dilip    I am  with [facility]  [address]  [place] [address] [phone].    Before we get started may I verify your date of birth? 1955    I am calling to officially welcome you to our practice and ask about your recent visit. Is this a good time to talk? Patient rounding sent through [place].

## 2023-03-06 ENCOUNTER — OFFICE VISIT (OUTPATIENT)
Dept: GYNECOLOGIC ONCOLOGY | Facility: CLINIC | Age: 68
End: 2023-03-06
Payer: MEDICARE

## 2023-03-06 VITALS
HEIGHT: 61 IN | WEIGHT: 153.1 LBS | TEMPERATURE: 98.3 F | DIASTOLIC BLOOD PRESSURE: 76 MMHG | BODY MASS INDEX: 28.9 KG/M2 | RESPIRATION RATE: 17 BRPM | SYSTOLIC BLOOD PRESSURE: 111 MMHG | OXYGEN SATURATION: 98 % | HEART RATE: 77 BPM

## 2023-03-06 DIAGNOSIS — Z85.3 HISTORY OF BREAST CANCER: ICD-10-CM

## 2023-03-06 DIAGNOSIS — Z85.118 HISTORY OF LUNG CANCER: ICD-10-CM

## 2023-03-06 DIAGNOSIS — Z01.419 WELL WOMAN EXAM WITH ROUTINE GYNECOLOGICAL EXAM: Primary | ICD-10-CM

## 2023-03-06 PROCEDURE — 99397 PER PM REEVAL EST PAT 65+ YR: CPT | Performed by: NURSE PRACTITIONER

## 2023-03-06 NOTE — PROGRESS NOTES
GYN ONCOLOGY ANNUAL WELL WOMAN VISIT      Linn Bean  [id number]  1955      Subjective   Chief Complaint: Annual Exam (No complaints)        History of present illness:      Linn Bean is a 68 y.o. year old female who is here today for an annual exam. The patient has a history of fibroid uterus, s/p hysterectomy in , as well as a family history of breast and ovarian cancers. She has a personal history of breast and lung cancer, both stage 1 separate primaries, diagnosed in 2018. She is s/p breast lumpectomy followed by radiation and lung lobectomy. She uses letrozole managed by her oncologist at [facility] for hormone blockade, will complete 5 year course next year. She continues Effexor for management of hot flashes.     She reports she is feeling very well today and has no complaints. She denies vaginal bleeding, pelvic pain, changes in bowel or bladder function, new or concerning lesions, and breast problems. All well woman screenings currently up-to-date. She is currently wearing a continuous heart monitor. She has one week left in her 1 month observation due to chronic shortness of breath with exertion. She notes all pulmonary testing was negative. There were some irregularities in her heart rate found on stress test which lead to the monitoring. She denies any cardiopulmonary symptoms today.         Obstetric History:  OB History        2    Para   2    Term                AB        Living           SAB        IAB        Ectopic        Molar        Multiple        Live Births                   Menstrual History:     No LMP recorded (lmp unknown). Patient has had a hysterectomy.          The current medication list and allergy list were reviewed and reconciled.     Past Medical History, Past Surgical History, Social History, Family History have been reviewed and are without significant changes except as mentioned.    Health Maintenance:  Last mammogram was Summer 2022. Last  "colonoscopy was 9/2020, with recommended follow-up in 5 year(s). Last DEXA was 2019. Last pap smear was 1/25/2022, results were normal PAP.        Review of Systems   Constitutional: Negative.    Gastrointestinal: Negative.    Genitourinary: Negative.    Skin:        Breast ROS negative       Objective   Physical Exam  Vital Signs: /76   Pulse 77   Temp 98.3 °F (36.8 °C) (Temporal)   Resp 17   Ht 156.2 cm (61.5\")   Wt 69.4 kg (153 lb 1.6 oz)   LMP  (LMP Unknown)   SpO2 98%   BMI 28.46 kg/m²   Vitals:    03/06/23 0914   PainSc: 0-No pain           General Appearance:  alert, cooperative, no apparent distress and appears stated age   Neurologic/Psychiatric: A&O x 3, gait steady, appropriate affect   Lungs:   Clear to auscultation bilaterally; respirations regular, even, and unlabored bilaterally   Heart:  Regular rate and rhythm, no murmurs appreciated   Breasts:  no masses, no lesions, no nipple discharge and right breast scarring consistent with history of lumpectomy   Abdomen:   Soft, non-tender, non-distended and no organomegaly   Lymph nodes: No cervical, supraclavicular, inguinal or axillary adenopathy noted   Extremities: Normal, atraumatic; no clubbing, cyanosis, or edema    Skin: No rashes, ulcers, or suspicious lesions noted   Pelvic: External Genitalia  without lesions or skin changes  Vagina  is pink, moist, without lesions.   Vaginal Cuff  Female Vaginal Cuff: smooth, intact and without visible lesions  Uterus  surgically absent  Ovaries  surgically absent bilaterally and without palpable masses or fullness  Parametria  smooth  Rectovaginal  Female rectovaginal: confirms no masses or bleeding and Hemoccult negative     ECOG score: 0             PHQ-9 Total Score:      Procedure Note:          Assessment and Plan:      Diagnoses and all orders for this visit:    1. Well woman exam with routine gynecological exam (Primary)    2. History of breast cancer    3. History of lung cancer          No " pap smear needed today.     She was encouraged to get yearly mammograms.  She should report any palpable breast lump(s) or skin changes regardless of mammographic findings.  I explained to Linn that notification regarding her mammogram results will come from the center performing the study.  Our office will not be routinely calling with mammogram results.  It is her responsibility to make sure that the results from the mammogram are communicated to her by the breast center.  If she has any questions about the results, she is welcome to call our office anytime.    Repeat colonoscopy in 2025 or sooner if new problems.     Continue Vitamin D and Calcium supplementation while on Arimidex.      Continue letrozole and Effexor as prescribed by oncologist and keep regular follow-ups.        Pain assessment was performed today as a part of patient’s care. For patients with pain related to surgery, gynecologic malignancy or cancer treatment, the plan is as noted in the assessment/plan.  For patients with pain not related to these issues, they are to seek any further needed care from a more appropriate provider, such as PCP.      Follow-up:     Return to clinic in 1 year for Annual exam.      Electronically signed by DAYAMI Mullins on 03/06/23 at 09:22 EST

## 2023-03-07 RX ORDER — METOPROLOL SUCCINATE 50 MG/1
TABLET, EXTENDED RELEASE ORAL
Qty: 90 TABLET | Refills: 0 | OUTPATIENT
Start: 2023-03-07

## 2023-04-07 RX ORDER — METOPROLOL SUCCINATE 50 MG/1
50 TABLET, EXTENDED RELEASE ORAL DAILY
Qty: 90 TABLET | Refills: 0 | OUTPATIENT
Start: 2023-04-07

## 2023-04-10 RX ORDER — METOPROLOL SUCCINATE 50 MG/1
TABLET, EXTENDED RELEASE ORAL
Qty: 90 TABLET | Refills: 0 | OUTPATIENT
Start: 2023-04-10

## 2023-05-11 ENCOUNTER — TELEPHONE (OUTPATIENT)
Dept: GYNECOLOGIC ONCOLOGY | Facility: CLINIC | Age: 68
End: 2023-05-11
Payer: MEDICARE

## 2023-05-11 NOTE — TELEPHONE ENCOUNTER
Caller: Linn Bean    Relationship: Self    Best call back number: [phone]    What is the best time to reach you: ANYTIME    Who are you requesting to speak with (clinical staff, provider,  specific staff member): CLINICAL     What was the call regarding: PATIENT IS NEEDING SOME KIND OF MEDICAL DOCUMENT SHOWING SHE HAS FAMILY HISTORY OF OVARIAN CANCER, HER DAUGHTER NEEDS IT TO MAKE APPT AT [facility].    CALL TO DISCUSS     Do you require a callback: YES PLEASE

## 2023-05-12 NOTE — TELEPHONE ENCOUNTER
Returned patient's call. Her daughter Vciky Pitt (Curtis) is needing documentation regarding family history of ovarian cancer to qualify for the ovarian cancer screening program at .   Patient's mother, Araseli Shrestha, was a patient of our office and passed away years ago from ovarian cancer. Informed them I am happy to draft a letter reporting the family history for them. I will mail this out next week. They appreciated the help.

## 2023-06-12 ENCOUNTER — TELEPHONE (OUTPATIENT)
Dept: NEUROLOGY | Facility: CLINIC | Age: 68
End: 2023-06-12
Payer: MEDICARE

## 2023-06-12 NOTE — TELEPHONE ENCOUNTER
Called pt and left message to inquire what diagnosis should be attached to requested referral to pulm.

## 2023-06-12 NOTE — TELEPHONE ENCOUNTER
Provider: HELEN ESQUEDA APRN     Caller: MIKAEL    Relationship to Patient: SELF    Phone Number: [phone]    Reason for Call: PT CALLING TO HAVE PROVIDER SEND A REFERRAL TO  DR. MIRANDA DRUMMOND FOR PULMONARY.  STATED SHE CALLED THEY NEED A REFERRAL OR REDCOMMENDATION.    PLEASE ADVISE

## 2023-06-13 ENCOUNTER — TELEPHONE (OUTPATIENT)
Dept: NEUROLOGY | Facility: CLINIC | Age: 68
End: 2023-06-13
Payer: MEDICARE

## 2023-06-13 DIAGNOSIS — R06.00 DYSPNEA, UNSPECIFIED TYPE: Primary | ICD-10-CM

## 2023-07-31 ENCOUNTER — OFFICE VISIT (OUTPATIENT)
Dept: PULMONOLOGY | Facility: CLINIC | Age: 68
End: 2023-07-31
Payer: MEDICARE

## 2023-07-31 VITALS
TEMPERATURE: 97.7 F | WEIGHT: 157.7 LBS | RESPIRATION RATE: 20 BRPM | BODY MASS INDEX: 27.94 KG/M2 | DIASTOLIC BLOOD PRESSURE: 82 MMHG | HEART RATE: 70 BPM | OXYGEN SATURATION: 90 % | SYSTOLIC BLOOD PRESSURE: 140 MMHG

## 2023-07-31 DIAGNOSIS — R05.3 CHRONIC COUGH: ICD-10-CM

## 2023-07-31 DIAGNOSIS — K21.9 GASTROESOPHAGEAL REFLUX DISEASE WITHOUT ESOPHAGITIS: ICD-10-CM

## 2023-07-31 DIAGNOSIS — R06.02 SHORTNESS OF BREATH: Primary | ICD-10-CM

## 2023-07-31 PROBLEM — J18.9 PNEUMONIA: Status: RESOLVED | Noted: 2022-12-20 | Resolved: 2023-07-31

## 2023-07-31 PROBLEM — R68.89 INFLUENZA-LIKE SYMPTOMS: Status: RESOLVED | Noted: 2021-12-30 | Resolved: 2023-07-31

## 2023-07-31 PROBLEM — Z20.822 SUSPECTED SEVERE ACUTE RESPIRATORY SYNDROME CORONAVIRUS 2 (SARS-COV-2) INFECTION: Status: RESOLVED | Noted: 2021-12-30 | Resolved: 2023-07-31

## 2023-07-31 PROCEDURE — 99204 OFFICE O/P NEW MOD 45 MIN: CPT | Performed by: INTERNAL MEDICINE

## 2023-07-31 PROCEDURE — 94010 BREATHING CAPACITY TEST: CPT | Performed by: INTERNAL MEDICINE

## 2023-07-31 PROCEDURE — 1160F RVW MEDS BY RX/DR IN RCRD: CPT | Performed by: INTERNAL MEDICINE

## 2023-07-31 PROCEDURE — 94729 DIFFUSING CAPACITY: CPT | Performed by: INTERNAL MEDICINE

## 2023-07-31 PROCEDURE — 94726 PLETHYSMOGRAPHY LUNG VOLUMES: CPT | Performed by: INTERNAL MEDICINE

## 2023-07-31 PROCEDURE — 1159F MED LIST DOCD IN RCRD: CPT | Performed by: INTERNAL MEDICINE

## 2023-07-31 PROCEDURE — 3079F DIAST BP 80-89 MM HG: CPT | Performed by: INTERNAL MEDICINE

## 2023-07-31 PROCEDURE — 3077F SYST BP >= 140 MM HG: CPT | Performed by: INTERNAL MEDICINE

## 2023-07-31 RX ORDER — OMEPRAZOLE 20 MG/1
40 CAPSULE, DELAYED RELEASE ORAL 2 TIMES DAILY
Qty: 90 CAPSULE | Refills: 3 | Status: SHIPPED | OUTPATIENT
Start: 2023-07-31

## 2023-08-18 ENCOUNTER — OFFICE VISIT (OUTPATIENT)
Dept: CARDIOLOGY | Facility: CLINIC | Age: 68
End: 2023-08-18
Payer: MEDICARE

## 2023-08-18 VITALS
SYSTOLIC BLOOD PRESSURE: 138 MMHG | HEIGHT: 63 IN | OXYGEN SATURATION: 98 % | DIASTOLIC BLOOD PRESSURE: 80 MMHG | WEIGHT: 158 LBS | BODY MASS INDEX: 28 KG/M2 | HEART RATE: 72 BPM

## 2023-08-18 DIAGNOSIS — E78.2 MIXED HYPERLIPIDEMIA: ICD-10-CM

## 2023-08-18 DIAGNOSIS — I10 PRIMARY HYPERTENSION: Primary | ICD-10-CM

## 2023-08-18 PROCEDURE — 3075F SYST BP GE 130 - 139MM HG: CPT | Performed by: INTERNAL MEDICINE

## 2023-08-18 PROCEDURE — 99214 OFFICE O/P EST MOD 30 MIN: CPT | Performed by: INTERNAL MEDICINE

## 2023-08-18 PROCEDURE — 93000 ELECTROCARDIOGRAM COMPLETE: CPT | Performed by: INTERNAL MEDICINE

## 2023-08-18 PROCEDURE — 3079F DIAST BP 80-89 MM HG: CPT | Performed by: INTERNAL MEDICINE

## 2023-08-18 NOTE — PROGRESS NOTES
[facility] at [facility]  Office visit  Linn Bean  1955  [phone]  There is no work phone number on file.    VISIT DATE:  8/18/2023    PCP: Lissy Keenan MD  [address] [place] [id number]    CC:  Chief Complaint   Patient presents with    Shortness of Breath       Previous cardiac studies and procedures:  November 2020 transthoracic echocardiogram  Left ventricular ejection fraction appears to be 61 - 65%. Left ventricular systolic function is normal.  Left ventricular diastolic function is consistent with (grade Ia w/high LAP) impaired relaxation.    January 2023  Coronary calcium score:  Coronary artery calcium score of 503 which places the   patient in the 90 percentile rank for gender and age.     Carotid duplex: Less than 20% bilaterally.    February 2023 myocardial perfusion imaging  1. Exercise myocardial perfusion imaging suggesting a low risk of   significant   obstructive coronary artery disease.   2. Normal LV ejection fraction.   3. Brief wide-complex rhythm early in exercise.  This might have been a   brief   run of SVT versus transient aberrant conduction.     August 2023 ambulatory ECG monitor: Unremarkable    ASSESSMENT:   Diagnosis Plan   1. Primary hypertension        2. Mixed hyperlipidemia              PLAN:  Hypertension, essential: Goal less than 130/80 mmHg.  Currently with reasonable control.  Continue current medical therapy.     Hyperlipidemia: Well-controlled.  Continue current medical therapy.  Goal LDL less than 70, repeat lipid profile prior to follow-up in 6 months.    Coronary calcification: Nonobstructive coronary atherosclerosis.  Continue aggressive risk factor modification.  Currently asymptomatic.  Heart healthy, predominant plant-based diet such as a Mediterranean diet.    Subjective  Normal assessment: Stable functional capacity.  Denies chest pain, dyspnea exertion or palpitations.  Blood pressures running less than  "135/80 mmHg.  Frequently forgetting her nighttime dose of metoprolol tartrate.    Initial evaluation: 65-year-old female with history of hypertension, dyslipidemia, remote history of smoking presenting with episodes of shortness of breath with exertion and recent worsening in blood pressure control.  She has had high blood pressure for approximately 2 years, onset shortly after being treated for lung nodule and breast cancer requiring lumpectomy and radiation therapy.  Follow-up imaging has been stable and negative for recurrent disease.  She denies chest discomfort.  Does report shortness of breath with such activities as going up a flight of stairs.  Intermittent brief palpitations associated with anxiety.  Developed cough on ACE inhibitor.  Compliant with current medical therapy.  Feels like her blood pressure has begun to improved after initiation of metoprolol about 4 weeks ago.  Blood pressures are predominantly running less than 130/85 mmHg.  She will have intermittent spikes in her systolic blood pressure up to 160 mmHg with diastolic blood pressures as high as 99 mmHg.  Her lowest recorded blood pressure over the previous 4 weeks was 85/66 mmHg.  She is not exercising on a regular basis.  Was diagnosed with mild sleep apnea 2 to 3 years ago, she did not tolerate CPAP but is using a dental appliance.  Her  does report that she snores prominently.  Only 1 caffeinated beverage per day, less than 2 glasses of wine per day.    PHYSICAL EXAMINATION:  Vitals:    08/18/23 1035   BP: 138/80   BP Location: Right arm   Patient Position: Sitting   Cuff Size: Adult   Pulse: 72   SpO2: 98%   Weight: 71.7 kg (158 lb)   Height: 160 cm (63\")       General Appearance:    Alert, cooperative, no distress, appears stated age   Head:    Normocephalic, without obvious abnormality, atraumatic   Eyes:    conjunctiva/corneas clear   Nose:   Nares normal, septum midline, mucosa normal, no drainage   Throat:   Lips, teeth and " gums normal   Neck:   Supple, symmetrical, trachea midline, no carotid    bruit or JVD   Lungs:     Clear to auscultation bilaterally, respirations unlabored   Chest Wall:    No tenderness or deformity    Heart:    Regular rate and rhythm, S1 and S2 normal, 2/6 early peaking systolic murmur right upper sternal border, rub   or gallop, normal carotid impulse bilaterally without bruit.   Abdomen:     Soft, non-tender   Extremities:   Extremities normal, atraumatic, no cyanosis or edema   Pulses:   2+ and symmetric all extremities   Skin:   Skin color, texture, turgor normal, no rashes or lesions       Diagnostic Data:    ECG 12 Lead    Date/Time: 8/18/2023 11:04 AM  Performed by: Vernon Bowman III, MD  Authorized by: Vernon Bowman III, MD   Comparison: compared with previous ECG from 10/7/2020  Similar to previous ECG  Rhythm: sinus rhythm    Clinical impression: normal ECG      Lab Results   Component Value Date    TRIG 119 12/02/2020    HDL 80 (H) 12/02/2020     Lab Results   Component Value Date    GLUCOSE 82 12/02/2020    BUN 13 12/02/2020    CREATININE 0.63 12/02/2020     12/02/2020    K 4.1 12/02/2020     12/02/2020    CO2 28.6 12/02/2020     Lab Results   Component Value Date    HGBA1C 6.14 (H) 12/02/2020     Lab Results   Component Value Date    WBC 8.38 02/06/2020    HGB 13.7 02/06/2020    HCT 39.6 02/06/2020     02/06/2020       Allergies  Allergies   Allergen Reactions    Ace Inhibitors Cough    Codeine Itching       Current Medications    Current Outpatient Medications:     acetaminophen (TYLENOL) 325 MG tablet, Take 2 tablets by mouth., Disp: , Rfl:     amLODIPine (NORVASC) 5 MG tablet, Take 1 tablet by mouth Daily., Disp: 90 tablet, Rfl: 3    aspirin 81 MG EC tablet, Take 1 tablet by mouth Daily., Disp: , Rfl:     B Complex Vitamins (VITAMIN B COMPLEX PO), , Disp: , Rfl:     calcium carbonate (OS-SAAD) 600 MG tablet, Take 1 tablet by mouth Daily., Disp: , Rfl:     ezetimibe (ZETIA) 10  MG tablet, Take 1 tablet by mouth Daily., Disp: , Rfl:     ibuprofen (ADVIL,MOTRIN) 200 MG tablet, Take 1 tablet by mouth., Disp: , Rfl:     letrozole (FEMARA) 2.5 MG tablet, Take 1 tablet by mouth Daily., Disp: , Rfl:     levothyroxine (Synthroid) 112 MCG tablet, Take 1 tablet by mouth Daily., Disp: 90 tablet, Rfl: 1    losartan-hydrochlorothiazide (HYZAAR) 100-25 MG per tablet, Take 1 tablet by mouth Daily., Disp: 90 tablet, Rfl: 3    metoprolol tartrate (LOPRESSOR) 50 MG tablet, Take 1 tablet by mouth., Disp: , Rfl:     multivitamin with minerals tablet tablet, Take 1 tablet by mouth Daily., Disp: , Rfl:     omeprazole (priLOSEC) 20 MG capsule, Take 2 capsules by mouth 2 (Two) Times a Day., Disp: 90 capsule, Rfl: 3    Probiotic Product (PROBIOTIC DAILY PO), Take  by mouth., Disp: , Rfl:     rosuvastatin (CRESTOR) 20 MG tablet, Crestor 20 mg tablet  Take 1 tablet every day by oral route., Disp: , Rfl:     venlafaxine XR (EFFEXOR-XR) 150 MG 24 hr capsule, Take 1 capsule by mouth Daily., Disp: 90 capsule, Rfl: 3    Vitamin D, Cholecalciferol, (CHOLECALCIFEROL) 10 MCG (400 UNIT) tablet, Take 2 tablets by mouth Daily., Disp: , Rfl:           ROS  ROS      SOCIAL HX  Social History     Socioeconomic History    Marital status:      Spouse name: Brint    Number of children: 2    Years of education: College   Tobacco Use    Smoking status: Former     Packs/day: 2.00     Years: 10.00     Pack years: 20.00     Types: Cigarettes     Start date: 1978     Quit date: 1988     Years since quittin.3    Smokeless tobacco: Never    Tobacco comments:     Mostly social smoker. one pack on weekend, may 1/2 pack during week   Vaping Use    Vaping Use: Never used   Substance and Sexual Activity    Alcohol use: Yes     Alcohol/week: 12.0 standard drinks     Types: 10 Glasses of wine, 2 Drinks containing 0.5 oz of alcohol per week     Comment: occas    Drug use: No    Sexual activity: Not Currently     Partners: Male      Birth control/protection: Post-menopausal       FAMILY HX  Family History   Problem Relation Age of Onset    Asthma Mother     Ovarian cancer Mother     Hypothyroidism Mother     Heart failure Father     Prostate cancer Father     Heart attack Father     Breast cancer Sister     Migraines Sister         in her teens and 20's             Vernon Bowman III, MD, FACC

## 2023-09-28 ENCOUNTER — OFFICE VISIT (OUTPATIENT)
Dept: PULMONOLOGY | Facility: CLINIC | Age: 68
End: 2023-09-28
Payer: MEDICARE

## 2023-09-28 VITALS
WEIGHT: 159.9 LBS | BODY MASS INDEX: 28.33 KG/M2 | SYSTOLIC BLOOD PRESSURE: 120 MMHG | TEMPERATURE: 97.7 F | OXYGEN SATURATION: 96 % | DIASTOLIC BLOOD PRESSURE: 68 MMHG | HEIGHT: 63 IN | HEART RATE: 72 BPM

## 2023-09-28 DIAGNOSIS — K21.9 GASTROESOPHAGEAL REFLUX DISEASE WITHOUT ESOPHAGITIS: ICD-10-CM

## 2023-09-28 DIAGNOSIS — R06.02 SHORTNESS OF BREATH: ICD-10-CM

## 2023-09-28 DIAGNOSIS — R05.3 CHRONIC COUGH: Primary | ICD-10-CM

## 2023-09-28 RX ORDER — OMEPRAZOLE 40 MG/1
40 CAPSULE, DELAYED RELEASE ORAL DAILY
Qty: 90 CAPSULE | Refills: 3 | Status: SHIPPED | OUTPATIENT
Start: 2023-09-28

## 2023-09-28 NOTE — PROGRESS NOTES
"Follow Up Office Note       Patient Name: Linn Bean    Referring Physician: No ref. provider found    Chief Complaint:    Chief Complaint   Patient presents with    Shortness of Breath     Follow up       History of Present Illness: Linn Bean is a 68 y.o. female who is here today to follow-up care with Pulmonary.  She has a past medical history significant for allergic rhinitis, hypertension, history of tobacco use quit in 1988, lung cancer s/p resection (2018), hyperlipidemia, and hypothyroidism.  Patient currently doing very well cough now much better than what it was previously.  Only taking the Prilosec 20 mg currently and the cough has started to come back.  She is working on dietary and lifestyle modifications.  Allergies are slightly worse today than usual.    Review of Systems:   Review of Systems   Constitutional:  Negative for chills, fatigue and fever.   HENT:  Negative for congestion and voice change.    Eyes:  Negative for blurred vision.   Respiratory:  Negative for cough, shortness of breath and wheezing.    Cardiovascular:  Negative for chest pain.   Skin:  Negative for dry skin.   Hematological:  Negative for adenopathy.   Psychiatric/Behavioral:  Negative for agitation and depressed mood.      The following portions of the patient's history were reviewed and updated as appropriate: allergies, current medications, past family history, past medical history, past social history, past surgical history and problem list.    Physical Exam:  Vital Signs:   Vitals:    09/28/23 1109   BP: 120/68   BP Location: Right arm   Patient Position: Sitting   Cuff Size: Adult   Pulse: 72   Temp: 97.7 °F (36.5 °C)   SpO2: 96%  Comment: Room air at rest   Weight: 72.5 kg (159 lb 14.4 oz)   Height: 160 cm (63\")       Physical Exam  Vitals and nursing note reviewed.   Constitutional:       General: She is not in acute distress.     Appearance: She is well-developed and normal weight. She is not ill-appearing or " toxic-appearing.   HENT:      Head: Normocephalic and atraumatic.   Cardiovascular:      Rate and Rhythm: Normal rate and regular rhythm.      Pulses: Normal pulses.      Heart sounds: Normal heart sounds. No murmur heard.    No friction rub. No gallop.   Pulmonary:      Effort: Pulmonary effort is normal. No respiratory distress.      Breath sounds: Normal breath sounds. No wheezing, rhonchi or rales.   Musculoskeletal:      Right lower leg: No edema.      Left lower leg: No edema.   Skin:     General: Skin is warm and dry.   Neurological:      Mental Status: She is alert and oriented to person, place, and time.       Immunization History   Administered Date(s) Administered    COVID-19 (PFIZER) Purple Cap Monovalent 03/02/2021, 03/31/2021, 10/21/2021    Flu Vaccine Split Quad 09/14/2018    FluMist 2-49yrs 10/05/2017    Flublock Quad =>18yrs 09/23/2020    Flublok 18+yrs 12/07/2021    Fluzone (or Fluarix & Flulaval for VFC) >6mos 09/14/2018    Fluzone Quad >6mos (Multi-dose) 09/14/2018, 10/01/2019    Hep A, 2 Dose 09/24/2018, 04/19/2019, 12/13/2019    Hepatitis A 09/24/2018, 04/19/2019, 12/13/2019    Influenza Quad Vaccine (Inpatient) 10/01/2019    Pneumococcal Polysaccharide (PPSV23) 09/24/2018    Shingrix 09/01/2019, 09/23/2020, 12/16/2020    Tdap 10/10/2012    Zostavax 10/17/2017       Results Review:   - CT scans from 6003-6114, from a pulmonary standpoint all CT scans of showed no significant nausea, masses, infiltrates.  There are a few areas of reticulation particularly in the right middle lobe very peripherally, but he has been stable on all CT scans.  - pulmonary function testing from 7/31/2023 which showed no obstruction or restriction with a mildly reduced DLCO.  - PFT from 2020 showed no obstruction or restriction with a normal DLCO.  - Echo report from 11/11/2020 showed an EF of 61 to 65%, with grade 1 diastolic dysfunction    Assessment / Plan:   Diagnoses and all orders for this visit:    1. Chronic  cough (Primary)  2. Gastroesophageal reflux disease without esophagitis  -Recommend to continue the Prilosec at 40 mg once daily for the next 6 months.  Also continue dietary lifestyle modifications.    3. Shortness of breath  -Shortness of breath is much better now.  No further work-up needed at this time.    4.  Seasonal allergic rhinitis  -Recommend Flonase 2 puffs per nostril nightly and daily second-generation antihistamine of her choice.    Follow Up:   Return in about 6 months (around 3/28/2024).       STACEY Hurley, DO  Pulmonary and Critical Care Medicine  Note Electronically Signed    Part of this note may be an electronic transcription/translation of spoken language to printed text using the Dragon Dictation System.

## 2023-10-31 DIAGNOSIS — K21.9 GASTROESOPHAGEAL REFLUX DISEASE WITHOUT ESOPHAGITIS: ICD-10-CM

## 2023-10-31 RX ORDER — OMEPRAZOLE 40 MG/1
40 CAPSULE, DELAYED RELEASE ORAL DAILY
Qty: 90 CAPSULE | Refills: 2 | Status: SHIPPED | OUTPATIENT
Start: 2023-10-31

## 2023-10-31 NOTE — TELEPHONE ENCOUNTER
Received a fax from [facility] needing 90 day refill for pt's Omeprazole. This has been sent in.

## 2023-11-07 DIAGNOSIS — K21.9 GASTROESOPHAGEAL REFLUX DISEASE WITHOUT ESOPHAGITIS: ICD-10-CM

## 2023-11-07 RX ORDER — OMEPRAZOLE 20 MG/1
40 CAPSULE, DELAYED RELEASE ORAL 2 TIMES DAILY
Qty: 90 CAPSULE | Refills: 3 | OUTPATIENT
Start: 2023-11-07

## 2024-02-05 ENCOUNTER — OFFICE VISIT (OUTPATIENT)
Dept: FAMILY MEDICINE CLINIC | Facility: CLINIC | Age: 69
End: 2024-02-05
Payer: MEDICARE

## 2024-02-05 ENCOUNTER — LAB (OUTPATIENT)
Dept: LAB | Facility: HOSPITAL | Age: 69
End: 2024-02-05
Payer: MEDICARE

## 2024-02-05 VITALS
HEART RATE: 71 BPM | HEIGHT: 63 IN | BODY MASS INDEX: 28.46 KG/M2 | WEIGHT: 160.6 LBS | SYSTOLIC BLOOD PRESSURE: 134 MMHG | OXYGEN SATURATION: 96 % | DIASTOLIC BLOOD PRESSURE: 82 MMHG | TEMPERATURE: 99 F

## 2024-02-05 DIAGNOSIS — M19.042 PRIMARY OSTEOARTHRITIS OF BOTH HANDS: ICD-10-CM

## 2024-02-05 DIAGNOSIS — M19.041 PRIMARY OSTEOARTHRITIS OF BOTH HANDS: ICD-10-CM

## 2024-02-05 DIAGNOSIS — F51.01 PRIMARY INSOMNIA: Primary | ICD-10-CM

## 2024-02-05 PROBLEM — R93.1 ELEVATED CORONARY ARTERY CALCIUM SCORE: Status: ACTIVE | Noted: 2023-04-28

## 2024-02-05 PROCEDURE — 86431 RHEUMATOID FACTOR QUANT: CPT

## 2024-02-05 PROCEDURE — 99214 OFFICE O/P EST MOD 30 MIN: CPT | Performed by: NURSE PRACTITIONER

## 2024-02-05 PROCEDURE — 3079F DIAST BP 80-89 MM HG: CPT | Performed by: NURSE PRACTITIONER

## 2024-02-05 PROCEDURE — 3075F SYST BP GE 130 - 139MM HG: CPT | Performed by: NURSE PRACTITIONER

## 2024-02-05 PROCEDURE — 1160F RVW MEDS BY RX/DR IN RCRD: CPT | Performed by: NURSE PRACTITIONER

## 2024-02-05 PROCEDURE — 1159F MED LIST DOCD IN RCRD: CPT | Performed by: NURSE PRACTITIONER

## 2024-02-05 PROCEDURE — 84550 ASSAY OF BLOOD/URIC ACID: CPT

## 2024-02-05 RX ORDER — HYDROXYZINE HYDROCHLORIDE 25 MG/1
25 TABLET, FILM COATED ORAL NIGHTLY PRN
Qty: 30 TABLET | Refills: 1 | Status: SHIPPED | OUTPATIENT
Start: 2024-02-05

## 2024-02-05 RX ORDER — MELOXICAM 7.5 MG/1
7.5 TABLET ORAL DAILY
Qty: 30 TABLET | Refills: 2 | Status: SHIPPED | OUTPATIENT
Start: 2024-02-05

## 2024-02-05 NOTE — PROGRESS NOTES
Follow Up Office Note     Patient Name: Linn Bean  : 1955   MRN: [id number]     Chief Complaint:    Chief Complaint   Patient presents with    Establish Care    Insomnia    Osteoarthritis       History of Present Illness: Linn Bean is a 69 y.o. female who presents today to establish care with a new PCP.   Patient reports her overall health as good.     Patient states that she has had difficulty falling asleep at night for the past several years. She does have sleep apnea and reports compliance with CPAP. She has tried melatonin with no improvement in her symptoms.    Patient has chronic severe OA bilateral hands. She has been following with [facility] for routine cortisone injections.    Subjective      I have reviewed and the following portions of the patient's history were updated as appropriate: past family history, past medical history, past social history, past surgical history and problem list.    Review of Systems:   Review of Systems   Constitutional: Negative.    Respiratory: Negative.     Cardiovascular: Negative.    Musculoskeletal:  Positive for arthralgias.   Psychiatric/Behavioral:  Positive for sleep disturbance.         Past Medical History:   Past Medical History:   Diagnosis Date    Adenocarcinoma of lung, stage 1, left 2018    LLL    CTS (carpal tunnel syndrome) 2018    DDD (degenerative disc disease), cervical     Ductal carcinoma in situ (DCIS) of right breast 2018    LUCILLE (generalized anxiety disorder)     HLD (hyperlipidemia)     HTN (hypertension)     Hypothyroidism     Mixed hyperlipidemia     Non-toxic uninodular goiter     Nontoxic uninodular goiter     Osteoarthritis of hands, bilateral     Sleep apnea     Severe - 44/hour now have a CPAP since     Stroke 2022    Brain MRI results indicate    TIA (transient ischemic attack) 2022    Vitamin D deficiency          Medications:     Current Outpatient Medications:     acetaminophen (TYLENOL)  325 MG tablet, Take 2 tablets by mouth., Disp: , Rfl:     amLODIPine (NORVASC) 5 MG tablet, Take 1 tablet by mouth Daily., Disp: 90 tablet, Rfl: 3    aspirin 81 MG EC tablet, Take 1 tablet by mouth Daily., Disp: , Rfl:     B Complex Vitamins (VITAMIN B COMPLEX PO), , Disp: , Rfl:     calcium carbonate (OS-SAAD) 600 MG tablet, Take 1 tablet by mouth Daily., Disp: , Rfl:     Diclofenac Sodium (VOLTAREN) 1 % gel gel, Apply 2 g topically to the appropriate area as directed., Disp: , Rfl:     ezetimibe (ZETIA) 10 MG tablet, Take 1 tablet by mouth Daily., Disp: , Rfl:     levothyroxine (Synthroid) 112 MCG tablet, Take 1 tablet by mouth Daily., Disp: 90 tablet, Rfl: 1    losartan-hydrochlorothiazide (HYZAAR) 100-25 MG per tablet, Take 1 tablet by mouth Daily., Disp: 90 tablet, Rfl: 3    metoprolol tartrate (LOPRESSOR) 50 MG tablet, Take 1 tablet by mouth., Disp: , Rfl:     multivitamin with minerals tablet tablet, Take 1 tablet by mouth Daily., Disp: , Rfl:     omeprazole (priLOSEC) 40 MG capsule, Take 1 capsule by mouth Daily., Disp: 90 capsule, Rfl: 2    Probiotic Product (PROBIOTIC DAILY PO), Take  by mouth., Disp: , Rfl:     rosuvastatin (CRESTOR) 20 MG tablet, Crestor 20 mg tablet  Take 1 tablet every day by oral route., Disp: , Rfl:     venlafaxine XR (EFFEXOR-XR) 150 MG 24 hr capsule, Take 1 capsule by mouth Daily., Disp: 90 capsule, Rfl: 3    Vitamin D, Cholecalciferol, (CHOLECALCIFEROL) 10 MCG (400 UNIT) tablet, Take 2 tablets by mouth Daily., Disp: , Rfl:     hydrOXYzine (ATARAX) 25 MG tablet, Take 1 tablet by mouth At Night As Needed (insomnia)., Disp: 30 tablet, Rfl: 1    meloxicam (Mobic) 7.5 MG tablet, Take 1 tablet by mouth Daily., Disp: 30 tablet, Rfl: 2    Allergies:   Allergies   Allergen Reactions    Ace Inhibitors Cough    Codeine Itching         2/5/2024     1:09 PM   PHQ-2/PHQ-9 Depression Screening   Little Interest or Pleasure in Doing Things 0-->not at all   Feeling Down, Depressed or Hopeless  "0-->not at all   PHQ-9: Brief Depression Severity Measure Score 0      LUCILLE-7  Feeling nervous, anxious or on edge: Not at all  Not being able to stop or control worrying: Not at all  Worrying too much about different things: Not at all  Trouble Relaxing: Not at all  Being so restless that it is hard to sit still: Not at all  Feeling afraid as if something awful might happen: Not at all  Becoming easily annoyed or irritable: Not at all  LUCILLE 7 Total Score: 0  If you checked any problems, how difficult have these problems made it for you to do your work, take care of things at home, or get along with other people: Not difficult at all         Objective     Physical Exam:  Vital Signs:   Vitals:    02/05/24 1311   BP: 134/82   Pulse: 71   Temp: 99 °F (37.2 °C)   TempSrc: Infrared   SpO2: 96%   Weight: 72.8 kg (160 lb 9.6 oz)   Height: 160 cm (62.99\")     Body mass index is 28.46 kg/m².     Physical Exam  Vitals and nursing note reviewed.   Constitutional:       General: She is not in acute distress.     Appearance: Normal appearance. She is well-developed. She is not ill-appearing, toxic-appearing or diaphoretic.   HENT:      Head: Normocephalic and atraumatic.   Cardiovascular:      Rate and Rhythm: Normal rate and regular rhythm.      Heart sounds: Normal heart sounds.   Pulmonary:      Effort: Pulmonary effort is normal. No respiratory distress.      Breath sounds: Normal breath sounds. No stridor. No wheezing.   Musculoskeletal:      Right hand: Deformity present. Decreased range of motion. Decreased strength.      Left hand: Deformity present. Decreased range of motion. Decreased strength.      Comments: Bilateral Heberden's and Park's nodes   Skin:     General: Skin is warm and dry.   Neurological:      General: No focal deficit present.      Mental Status: She is alert and oriented to person, place, and time.   Psychiatric:         Mood and Affect: Mood normal.         Behavior: Behavior normal. Behavior is " cooperative.         Thought Content: Thought content normal.         Judgment: Judgment normal.         Assessment / Plan      Assessment/Plan:   Diagnoses and all orders for this visit:    1. Primary insomnia (Primary)  Assessment & Plan:  Chronic problem. Plan to begin trial of hydroxyzine at bedtime.    Orders:  -     hydrOXYzine (ATARAX) 25 MG tablet; Take 1 tablet by mouth At Night As Needed (insomnia).  Dispense: 30 tablet; Refill: 1    2. Primary osteoarthritis of both hands  -     meloxicam (Mobic) 7.5 MG tablet; Take 1 tablet by mouth Daily.  Dispense: 30 tablet; Refill: 2  -     Rheumatoid Factor; Future  -     Uric Acid; Future  -     Ambulatory Referral to Rheumatology       Follow Up:   PRN and at next scheduled appointment(s) with PCP.    Discussed the nature of the medical condition(s) risks, complications, implications, management, safe and proper use of medications. Encouraged medication compliance, and keeping scheduled follow up appointments with me and any other providers.      I spent 30 minutes caring for Linn on this date of service. This time includes time spent by me in the following activities:preparing for the visit, reviewing tests, performing a medically appropriate examination and/or evaluation , counseling and educating the patient/family/caregiver, ordering medications, tests, or procedures, and documenting information in the medical record.     RTC if symptoms fail to improve, to ER if symptoms worsen.        *Dictated Utilizing Dragon Dictation   Please note that portions of this note were completed with a voice recognition program.   Part of this note may be an electronic transcription/translation of spoken language to printed text using the Dragon Dictation System. Spelling and/or grammatical errors may exist despite efforts at proofreading.      NOTE TO PATIENT: The 21st Century Cures Act makes medical notes like these available to patients in the interest of transparency.  However, be advised this is a medical document. It is intended as peer to peer communication. It is written in medical language and may contain abbreviations or verbiage that are unfamiliar. It may appear blunt or direct. Medical documents are intended to carry relevant information, facts as evident, and the clinical opinion of the practitioner.      DAYAMI Coronado  [facility] [place]

## 2024-02-05 NOTE — ASSESSMENT & PLAN NOTE
Chronic problem which has been progressively worsening. Plan to begin Meloxicam, D/C Ibuprofen. May take Tylenol. Also plan to check labs today per orders. Further recommendation after results evaluation.   Plan to refer to rheumatology.

## 2024-02-06 LAB
CHROMATIN AB SERPL-ACNC: <10 IU/ML (ref 0–14)
URATE SERPL-MCNC: 2.9 MG/DL (ref 2.4–5.7)

## 2024-03-15 ENCOUNTER — OFFICE VISIT (OUTPATIENT)
Dept: GYNECOLOGIC ONCOLOGY | Facility: CLINIC | Age: 69
End: 2024-03-15
Payer: MEDICARE

## 2024-03-15 VITALS
HEART RATE: 107 BPM | HEIGHT: 63 IN | OXYGEN SATURATION: 96 % | WEIGHT: 159.1 LBS | SYSTOLIC BLOOD PRESSURE: 120 MMHG | DIASTOLIC BLOOD PRESSURE: 74 MMHG | TEMPERATURE: 97.3 F | RESPIRATION RATE: 17 BRPM | BODY MASS INDEX: 28.19 KG/M2

## 2024-03-15 DIAGNOSIS — Z85.3 HISTORY OF BREAST CANCER: ICD-10-CM

## 2024-03-15 DIAGNOSIS — Z85.118 HISTORY OF LUNG CANCER: ICD-10-CM

## 2024-03-15 DIAGNOSIS — Z01.419 WELL WOMAN EXAM WITH ROUTINE GYNECOLOGICAL EXAM: Primary | ICD-10-CM

## 2024-03-15 NOTE — PROGRESS NOTES
GYN ONCOLOGY ANNUAL WELL WOMAN VISIT      Linn Bean  [id number]  1955      Subjective   Chief Complaint: well woman exam with routine gynecologic exam        History of present illness:    Linn Bean is a 69 y.o. year old female who is here today for an annual exam. She is not due for PAP-- last completed 1/2022. The patient has a history of fibroid uterus, s/p hysterectomy in 2000, as well as a family history of breast and ovarian cancers. She has a personal history of breast and lung cancer, both stage 1 separate primaries, diagnosed in 2018. She is s/p breast lumpectomy followed by radiation and lung lobectomy. She completed 5yrs of letrozole in 12/2023. Her oncologist at [facility] (Abi Cordero). She continues Effexor for management of hot flashes. States it is effectively managing symptoms.     She reports she is feeling very well today and has no complaints. +She does mention that occasionally she experiences intermittent tenderness of the underside of left breast. Denies any recent or persistent breast pain, nipple discharge, erythema or dimpling of skin on breasts, palpable lumps/ bumps, or adenopathy. She denies vaginal bleeding, pelvic pain, changes in bowel or bladder function, new or concerning lesions, and breast problems. All well woman screenings currently up-to-date.     At time of her last visit she was wearing a continuous heart monitor for evaluation of chronic shortness of breath with exertion. She notes all pulmonary testing was negative. There were some irregularities in her heart rate found on stress test which lead to the monitoring. Holter monitor results reportedly unremarkable. She denies any cardiopulmonary symptoms today. States that she does still have intermittent episodes of racing heart without cp or SOB-- improved overall since increasing metoprolol dose to BID as prescribed. Admits she was previously only taking in the AM bc she was unaware it was supposed to be  "taken twice daily. Following with Dr Bowman, cardiology.        Cancer History:   Oncology/Hematology History    No history exists.       Obstetric History:  OB History          2    Para   2    Term                AB        Living             SAB        IAB        Ectopic        Molar        Multiple        Live Births                   Menstrual History:     No LMP recorded (lmp unknown). Patient has had a hysterectomy.          The current medication list and allergy list were reviewed and reconciled.     Past Medical History, Past Surgical History, Social History, Family History have been reviewed and are without significant changes except as mentioned.    Health Maintenance:  Last mammogram was Last bilateral Dx mmg report available for review at this time in EMR was 22 [facility]. Pt reports she did complete another in ~2023 . Last colonoscopy was 2020 ([facility]), with recommended follow-up in 5 year(s). Last DEXA was Last report in EMR available for review at this time was  (normal BMD). Reports she has completed another DEXA post-covid, unsure exact date . Last pap smear was 2022, results were  normal PAP.. She  does have a history of abnormal pap smears. 20+ yrs ago.      Review of Systems   Constitutional: Negative.    Respiratory: Negative.     Cardiovascular: Negative.    Genitourinary: Negative.    Hematological: Negative.          Objective   Physical Exam  Vital Signs: /74   Pulse 107   Temp 97.3 °F (36.3 °C) (Temporal)   Resp 17   Ht 160 cm (62.99\")   Wt 72.2 kg (159 lb 1.6 oz)   LMP  (LMP Unknown)   SpO2 96%   BMI 28.19 kg/m²   Vitals:    03/15/24 0950   PainSc: 0-No pain           General Appearance:  alert, cooperative, no apparent distress and appears stated age   Neurologic/Psych: A&O x 3, gait steady, appropriate affect   Lungs:   Clear to auscultation bilaterally; respirations regular, even, and unlabored bilaterally   Heart:  Regular rate and rhythm, " "no murmurs appreciated   Breasts:  Symmetrical, no masses, no lesions, no nipple discharge, and Right breast scarring consistent with h/o lumpectomy. Previous radiation marking site noted on right areola.    Abdomen:   Soft, non-tender, non-distended, and no organomegaly   Lymph nodes: No cervical, supraclavicular, inguinal or axillary adenopathy noted   Extremities: Normal, atraumatic; no clubbing, cyanosis, or edema    Skin: No rashes, ulcers, or suspicious lesions noted   Pelvic: External genitalia without lesions or skin changes.  Vagina is pink, moist, without lesions.  Vaginal cuff is smooth, intact, and without visible lesions.  Uterus surgically absent.  Ovaries surgically absent bilaterally and without palpable masses or fullness.     ECOG score: 0             Result Review     Tumor marker:  No results found for: \"\"    PHQ-9 Total Score:      Procedure Note:          Assessment and Plan:        Diagnoses and all orders for this visit:    1. Well woman exam with routine gynecological exam (Primary)    2. History of breast cancer    3. History of lung cancer      No PAP smear needed today, repeat due next year     She was encouraged to get yearly mammograms.  She should report any palpable breast lump(s) or skin changes regardless of mammographic findings.  I explained to Linn that notification regarding her mammogram results will come from the center performing the study.  Our office will not be routinely calling with mammogram results.  It is her responsibility to make sure that the results from the mammogram are communicated to her by the breast center.  If she has any questions about the results, she is welcome to call our office anytime.     Repeat colonoscopy in 2025 or sooner if new problems.      Continue Vitamin D and Calcium supplementation while on Arimidex.     Need updated radiology report from most recent DEXA and mmg completed 8/2023.     Has now completed adjuvant endocrine therapy with " letrozole. Per Dr Koo's 12-18-23 office note she will be due for yearly mmg in 9/2024, with plan to f/u with her annually. Continue Effexor as prescribed by oncologist and keep regular follow-ups.     Pain assessment was performed today as a part of patient’s care. For patients with pain related to surgery, gynecologic malignancy or cancer treatment, the plan is as noted in the assessment/plan.  For patients with pain not related to these issues, they are to seek any further needed care from a more appropriate provider, such as PCP.      Pain assessment was performed today as a part of patient’s care. For patients with pain related to surgery, gynecologic malignancy or cancer treatment, the plan is as noted in the assessment/plan.  For patients with pain not related to these issues, they are to seek any further needed care from a more appropriate provider, such as PCP.    Advance Care Planning   ACP discussion was held with the patient during this visit. Patient has an advance directive (not in EMR), copy requested.       Follow-up:       Return to clinic in 1 year for Annual exam and repeat pap smear.      Electronically signed by DAYAMI Wei on 03/15/2024

## 2024-04-12 ENCOUNTER — OFFICE VISIT (OUTPATIENT)
Dept: PULMONOLOGY | Facility: CLINIC | Age: 69
End: 2024-04-12
Payer: MEDICARE

## 2024-04-12 VITALS
SYSTOLIC BLOOD PRESSURE: 130 MMHG | DIASTOLIC BLOOD PRESSURE: 62 MMHG | HEIGHT: 63 IN | WEIGHT: 158 LBS | TEMPERATURE: 98 F | BODY MASS INDEX: 28 KG/M2 | OXYGEN SATURATION: 94 % | HEART RATE: 83 BPM

## 2024-04-12 DIAGNOSIS — J30.2 SEASONAL ALLERGIC RHINITIS, UNSPECIFIED TRIGGER: ICD-10-CM

## 2024-04-12 DIAGNOSIS — K21.9 GASTROESOPHAGEAL REFLUX DISEASE WITHOUT ESOPHAGITIS: Primary | ICD-10-CM

## 2024-04-12 PROBLEM — F41.1 GAD (GENERALIZED ANXIETY DISORDER): Status: RESOLVED | Noted: 2022-12-20 | Resolved: 2024-04-12

## 2024-04-12 PROBLEM — R06.02 SHORTNESS OF BREATH: Status: RESOLVED | Noted: 2022-11-08 | Resolved: 2024-04-12

## 2024-04-12 PROBLEM — R05.3 CHRONIC COUGH: Status: RESOLVED | Noted: 2020-02-12 | Resolved: 2024-04-12

## 2024-04-12 NOTE — PROGRESS NOTES
"Follow Up Office Note       Patient Name: Linn Bean    Referring Physician: No ref. provider found    Chief Complaint:    Chief Complaint   Patient presents with    Shortness of Breath     Follow up       History of Present Illness: Linn Bean is a 69 y.o. female who is here today to follow-up care with Pulmonary.  She has a past medical history significant for allergic rhinitis, hypertension, history of tobacco use quit in 1988, lung cancer s/p resection (2018), hyperlipidemia, and hypothyroidism.  Cough resolved doing very well currently.  No acute complaints.    Review of Systems:   Review of Systems   Constitutional:  Negative for chills, fatigue and fever.   HENT:  Negative for congestion and voice change.    Eyes:  Negative for blurred vision.   Respiratory:  Negative for cough, shortness of breath and wheezing.    Cardiovascular:  Negative for chest pain.   Skin:  Negative for dry skin.   Hematological:  Negative for adenopathy.   Psychiatric/Behavioral:  Negative for agitation and depressed mood.        The following portions of the patient's history were reviewed and updated as appropriate: allergies, current medications, past family history, past medical history, past social history, past surgical history and problem list.    Physical Exam:  Vital Signs:   Vitals:    04/12/24 1014   BP: 130/62   BP Location: Left arm   Patient Position: Sitting   Cuff Size: Adult   Pulse: 83   Temp: 98 °F (36.7 °C)   SpO2: 94%  Comment: Room air at rest   Weight: 71.7 kg (158 lb)   Height: 160 cm (62.99\")       Physical Exam  Vitals and nursing note reviewed.   Constitutional:       General: She is not in acute distress.     Appearance: She is well-developed and normal weight. She is not ill-appearing or toxic-appearing.   HENT:      Head: Normocephalic and atraumatic.   Cardiovascular:      Rate and Rhythm: Normal rate and regular rhythm.      Pulses: Normal pulses.      Heart sounds: Normal heart sounds. No murmur " heard.     No friction rub. No gallop.   Pulmonary:      Effort: Pulmonary effort is normal. No respiratory distress.      Breath sounds: Normal breath sounds. No wheezing, rhonchi or rales.   Musculoskeletal:      Right lower leg: No edema.      Left lower leg: No edema.   Skin:     General: Skin is warm and dry.   Neurological:      Mental Status: She is alert and oriented to person, place, and time.         Immunization History   Administered Date(s) Administered    31-influenza Vac Quardvalent Preservativ 09/23/2020    Arexvy (RSV, Adults 60+ yrs) 10/02/2023    COVID-19 (PFIZER) Purple Cap Monovalent 03/02/2021, 03/31/2021, 10/21/2021    COVID-19 F23 (PFIZER) 12YRS+ (COMIRNATY) 10/02/2023    Flu Vaccine Split Quad 09/14/2018    FluMist 2-49yrs 10/05/2017    Flublock Quad =>18yrs 09/23/2020    Flublok 18+yrs 12/07/2021    Fluzone (or Fluarix & Flulaval for VFC) >6mos 09/14/2018    Fluzone High-Dose 65+yrs 10/02/2023    Fluzone Quad >6mos (Multi-dose) 09/14/2018, 10/01/2019    Hep A, 2 Dose 09/24/2018, 04/19/2019, 12/13/2019    Hepatitis A 09/24/2018, 04/19/2019, 12/13/2019    Influenza Quad Vaccine (Inpatient) 10/01/2019, 10/02/2023    Pneumococcal Polysaccharide (PPSV23) 09/24/2018    Shingrix 09/01/2019, 09/23/2020, 12/16/2020    Tdap 10/10/2012    Zostavax 10/17/2017       Results Review:   - CT scans from 4381-3251, from a pulmonary standpoint all CT scans of showed no significant nausea, masses, infiltrates.  There are a few areas of reticulation particularly in the right middle lobe very peripherally, but he has been stable on all CT scans.  - pulmonary function testing from 7/31/2023 which showed no obstruction or restriction with a mildly reduced DLCO.  - PFT from 2020 showed no obstruction or restriction with a normal DLCO.  - Echo report from 11/11/2020 showed an EF of 61 to 65%, with grade 1 diastolic dysfunction    Assessment / Plan:   Diagnoses and all orders for this visit:    1. Gastroesophageal  reflux disease without esophagitis (Primary)  -Cough is resolved reflux doing well continue omeprazole 40 mg once daily 30 minutes prior to eating empty stomach    2. Seasonal allergic rhinitis, unspecified trigger  -Doing well continue Flonase and Mucinex      Follow Up:   Return in about 1 year (around 4/12/2025).       STACEY Hurley, DO  Pulmonary and Critical Care Medicine  Note Electronically Signed    Part of this note may be an electronic transcription/translation of spoken language to printed text using the Dragon Dictation System.

## 2024-04-17 ENCOUNTER — OFFICE VISIT (OUTPATIENT)
Dept: CARDIOLOGY | Facility: CLINIC | Age: 69
End: 2024-04-17
Payer: MEDICARE

## 2024-04-17 VITALS
WEIGHT: 153 LBS | HEART RATE: 75 BPM | DIASTOLIC BLOOD PRESSURE: 68 MMHG | BODY MASS INDEX: 27.11 KG/M2 | HEIGHT: 63 IN | OXYGEN SATURATION: 98 % | SYSTOLIC BLOOD PRESSURE: 128 MMHG

## 2024-04-17 DIAGNOSIS — E03.9 HYPOTHYROIDISM (ACQUIRED): ICD-10-CM

## 2024-04-17 DIAGNOSIS — I10 PRIMARY HYPERTENSION: ICD-10-CM

## 2024-04-17 DIAGNOSIS — R93.1 ELEVATED CORONARY ARTERY CALCIUM SCORE: ICD-10-CM

## 2024-04-17 DIAGNOSIS — E78.2 MIXED HYPERLIPIDEMIA: Primary | ICD-10-CM

## 2024-04-17 NOTE — PROGRESS NOTES
[facility] at [facility]  Office visit  Linn Bean  1955  [phone]  There is no work phone number on file.    VISIT DATE:  4/17/2024    PCP: Alicia Jensen APRN  [address] [phone]    CC:  Chief Complaint   Patient presents with    Primary hypertension       Previous cardiac studies and procedures:  November 2020 transthoracic echocardiogram  Left ventricular ejection fraction appears to be 61 - 65%. Left ventricular systolic function is normal.  Left ventricular diastolic function is consistent with (grade Ia w/high LAP) impaired relaxation.    January 2023  Coronary calcium score:  Coronary artery calcium score of 503 which places the   patient in the 90 percentile rank for gender and age.     Carotid duplex: Less than 20% bilaterally.    February 2023 myocardial perfusion imaging  1. Exercise myocardial perfusion imaging suggesting a low risk of   significant   obstructive coronary artery disease.   2. Normal LV ejection fraction.   3. Brief wide-complex rhythm early in exercise.  This might have been a   brief   run of SVT versus transient aberrant conduction.     August 2023 ambulatory ECG monitor: Unremarkable    ASSESSMENT:   Diagnosis Plan   1. Mixed hyperlipidemia  Comprehensive Metabolic Panel    Lipid Panel      2. Primary hypertension        3. Elevated coronary artery calcium score        4. Hypothyroidism (acquired)  TSH    T4, Free              PLAN:  Hypertension, essential: Goal less than 130/80 mmHg.  Currently with reasonable control.  Continue current medical therapy.     Hyperlipidemia: Well-controlled.  Continue current medical therapy.  Goal LDL less than 70, repeat lipid profile pending.    Coronary calcification: Nonobstructive coronary atherosclerosis.  Continue aggressive risk factor modification.  Currently asymptomatic.  Heart healthy, predominant plant-based diet such as a Mediterranean diet.    Tachypalpitations: No high  risk clinical features.  Previous unremarkable ambulatory ECG monitors.  Continue beta-blockade.    Subjective  Normal assessment: Stable functional capacity.  Denies chest pain, dyspnea exertion.  Occasional episodes of mild tachypalpitations, usually at rest, no obvious triggers, lasting less than 5 minutes.  Correlated home blood pressure cuff today.  Home blood pressure cuff consistently measuring both systolic and diastolic pressures 10 to 15 mmHg higher than office pressures.    Initial evaluation: 65-year-old female with history of hypertension, dyslipidemia, remote history of smoking presenting with episodes of shortness of breath with exertion and recent worsening in blood pressure control.  She has had high blood pressure for approximately 2 years, onset shortly after being treated for lung nodule and breast cancer requiring lumpectomy and radiation therapy.  Follow-up imaging has been stable and negative for recurrent disease.  She denies chest discomfort.  Does report shortness of breath with such activities as going up a flight of stairs.  Intermittent brief palpitations associated with anxiety.  Developed cough on ACE inhibitor.  Compliant with current medical therapy.  Feels like her blood pressure has begun to improved after initiation of metoprolol about 4 weeks ago.  Blood pressures are predominantly running less than 130/85 mmHg.  She will have intermittent spikes in her systolic blood pressure up to 160 mmHg with diastolic blood pressures as high as 99 mmHg.  Her lowest recorded blood pressure over the previous 4 weeks was 85/66 mmHg.  She is not exercising on a regular basis.  Was diagnosed with mild sleep apnea 2 to 3 years ago, she did not tolerate CPAP but is using a dental appliance.  Her  does report that she snores prominently.  Only 1 caffeinated beverage per day, less than 2 glasses of wine per day.    PHYSICAL EXAMINATION:  Vitals:    04/17/24 1151   BP: 128/68   BP Location:  "Right arm   Patient Position: Sitting   Pulse: 75   SpO2: 98%   Weight: 69.4 kg (153 lb)   Height: 160 cm (63\")         General Appearance:    Alert, cooperative, no distress, appears stated age   Head:    Normocephalic, without obvious abnormality, atraumatic   Eyes:    conjunctiva/corneas clear   Nose:   Nares normal, septum midline, mucosa normal, no drainage   Throat:   Lips, teeth and gums normal   Neck:   Supple, symmetrical, trachea midline, no carotid    bruit or JVD   Lungs:     Clear to auscultation bilaterally, respirations unlabored   Chest Wall:    No tenderness or deformity    Heart:    Regular rate and rhythm, S1 and S2 normal, 2/6 early peaking systolic murmur right upper sternal border, rub   or gallop, normal carotid impulse bilaterally without bruit.   Abdomen:     Soft, non-tender   Extremities:   Extremities normal, atraumatic, no cyanosis or edema   Pulses:   2+ and symmetric all extremities   Skin:   Skin color, texture, turgor normal, no rashes or lesions       Diagnostic Data:  Procedures  Lab Results   Component Value Date    TRIG 119 12/02/2020    HDL 80 (H) 12/02/2020     Lab Results   Component Value Date    GLUCOSE 82 12/02/2020    BUN 13 12/02/2020    CREATININE 0.63 12/02/2020     12/02/2020    K 4.1 12/02/2020     12/02/2020    CO2 28.6 12/02/2020     Lab Results   Component Value Date    HGBA1C 6.14 (H) 12/02/2020     Lab Results   Component Value Date    WBC 8.38 02/06/2020    HGB 13.7 02/06/2020    HCT 39.6 02/06/2020     02/06/2020       Allergies  Allergies   Allergen Reactions    Ace Inhibitors Cough    Codeine Itching       Current Medications    Current Outpatient Medications:     acetaminophen (TYLENOL) 325 MG tablet, Take 2 tablets by mouth., Disp: , Rfl:     amLODIPine (NORVASC) 5 MG tablet, Take 1 tablet by mouth Daily., Disp: 90 tablet, Rfl: 3    aspirin 81 MG EC tablet, Take 1 tablet by mouth Daily., Disp: , Rfl:     B Complex Vitamins (VITAMIN B " COMPLEX PO), , Disp: , Rfl:     calcium carbonate (OS-SAAD) 600 MG tablet, Take 1 tablet by mouth Daily., Disp: , Rfl:     Diclofenac Sodium (VOLTAREN) 1 % gel gel, Apply 2 g topically to the appropriate area as directed., Disp: , Rfl:     ezetimibe (ZETIA) 10 MG tablet, Take 1 tablet by mouth Daily., Disp: , Rfl:     hydrOXYzine (ATARAX) 25 MG tablet, Take 1 tablet by mouth At Night As Needed (insomnia)., Disp: 30 tablet, Rfl: 1    levothyroxine (Synthroid) 112 MCG tablet, Take 1 tablet by mouth Daily., Disp: 90 tablet, Rfl: 1    losartan-hydrochlorothiazide (HYZAAR) 100-25 MG per tablet, Take 1 tablet by mouth Daily., Disp: 90 tablet, Rfl: 3    meloxicam (Mobic) 7.5 MG tablet, Take 1 tablet by mouth Daily., Disp: 30 tablet, Rfl: 2    metoprolol tartrate (LOPRESSOR) 50 MG tablet, Take 1 tablet by mouth., Disp: , Rfl:     multivitamin with minerals tablet tablet, Take 1 tablet by mouth Daily., Disp: , Rfl:     omeprazole (priLOSEC) 40 MG capsule, Take 1 capsule by mouth Daily., Disp: 90 capsule, Rfl: 2    Probiotic Product (PROBIOTIC DAILY PO), Take  by mouth., Disp: , Rfl:     rosuvastatin (CRESTOR) 20 MG tablet, Crestor 20 mg tablet  Take 1 tablet every day by oral route., Disp: , Rfl:     venlafaxine XR (EFFEXOR-XR) 150 MG 24 hr capsule, Take 1 capsule by mouth Daily., Disp: 90 capsule, Rfl: 3    Vitamin D, Cholecalciferol, (CHOLECALCIFEROL) 10 MCG (400 UNIT) tablet, Take 2 tablets by mouth Daily., Disp: , Rfl:           ROS  ROS      SOCIAL HX  Social History     Socioeconomic History    Marital status:      Spouse name: Brint    Number of children: 2    Years of education: College   Tobacco Use    Smoking status: Former     Current packs/day: 0.00     Average packs/day: 2.0 packs/day for 10.3 years (20.7 ttl pk-yrs)     Types: Cigarettes     Start date: 1978     Quit date: 1988     Years since quittin.9    Smokeless tobacco: Never    Tobacco comments:     Mostly social smoker. one pack on  weekend, may 1/2 pack during week   Vaping Use    Vaping status: Never Used   Substance and Sexual Activity    Alcohol use: Yes     Alcohol/week: 12.0 standard drinks of alcohol     Types: 10 Glasses of wine, 2 Drinks containing 0.5 oz of alcohol per week     Comment: occas    Drug use: No    Sexual activity: Not Currently     Partners: Male     Birth control/protection: Post-menopausal       FAMILY HX  Family History   Problem Relation Age of Onset    Asthma Mother     Ovarian cancer Mother     Hypothyroidism Mother     Heart failure Father     Prostate cancer Father     Heart attack Father     Breast cancer Sister     Migraines Sister         in her teens and 20's             Vernon Bowman III, MD, FACC

## 2024-04-22 ENCOUNTER — LAB (OUTPATIENT)
Dept: LAB | Facility: HOSPITAL | Age: 69
End: 2024-04-22
Payer: MEDICARE

## 2024-04-22 LAB
ALBUMIN SERPL-MCNC: 4.2 G/DL (ref 3.5–5.2)
ALBUMIN/GLOB SERPL: 2.2 G/DL
ALP SERPL-CCNC: 74 U/L (ref 39–117)
ALT SERPL W P-5'-P-CCNC: 29 U/L (ref 1–33)
ANION GAP SERPL CALCULATED.3IONS-SCNC: 10.3 MMOL/L (ref 5–15)
AST SERPL-CCNC: 21 U/L (ref 1–32)
BILIRUB SERPL-MCNC: 0.5 MG/DL (ref 0–1.2)
BUN SERPL-MCNC: 15 MG/DL (ref 8–23)
BUN/CREAT SERPL: 25.9 (ref 7–25)
CALCIUM SPEC-SCNC: 9.4 MG/DL (ref 8.6–10.5)
CHLORIDE SERPL-SCNC: 105 MMOL/L (ref 98–107)
CHOLEST SERPL-MCNC: 178 MG/DL (ref 0–200)
CO2 SERPL-SCNC: 26.7 MMOL/L (ref 22–29)
CREAT SERPL-MCNC: 0.58 MG/DL (ref 0.57–1)
EGFRCR SERPLBLD CKD-EPI 2021: 99.3 ML/MIN/1.73
GLOBULIN UR ELPH-MCNC: 1.9 GM/DL
GLUCOSE SERPL-MCNC: 118 MG/DL (ref 65–99)
HDLC SERPL-MCNC: 60 MG/DL (ref 40–60)
LDLC SERPL CALC-MCNC: 99 MG/DL (ref 0–100)
LDLC/HDLC SERPL: 1.61 {RATIO}
POTASSIUM SERPL-SCNC: 3.8 MMOL/L (ref 3.5–5.2)
PROT SERPL-MCNC: 6.1 G/DL (ref 6–8.5)
SODIUM SERPL-SCNC: 142 MMOL/L (ref 136–145)
T4 FREE SERPL-MCNC: 1.2 NG/DL (ref 0.93–1.7)
TRIGL SERPL-MCNC: 108 MG/DL (ref 0–150)
TSH SERPL DL<=0.05 MIU/L-ACNC: 7.13 UIU/ML (ref 0.27–4.2)
VLDLC SERPL-MCNC: 19 MG/DL (ref 5–40)

## 2024-04-22 PROCEDURE — 84439 ASSAY OF FREE THYROXINE: CPT | Performed by: INTERNAL MEDICINE

## 2024-04-22 PROCEDURE — 80061 LIPID PANEL: CPT | Performed by: INTERNAL MEDICINE

## 2024-04-22 PROCEDURE — 80053 COMPREHEN METABOLIC PANEL: CPT | Performed by: INTERNAL MEDICINE

## 2024-04-22 PROCEDURE — 84443 ASSAY THYROID STIM HORMONE: CPT | Performed by: INTERNAL MEDICINE

## 2024-04-23 ENCOUNTER — TELEPHONE (OUTPATIENT)
Dept: CARDIOLOGY | Facility: CLINIC | Age: 69
End: 2024-04-23
Payer: MEDICARE

## 2024-04-23 NOTE — TELEPHONE ENCOUNTER
----- Message from Vernon Bowman III, MD sent at 4/23/2024  8:25 AM EDT -----  Cholesterol profile has improved, continue current medical therapy.

## 2024-04-30 DIAGNOSIS — I10 ESSENTIAL HYPERTENSION: ICD-10-CM

## 2024-04-30 DIAGNOSIS — I10 PRIMARY HYPERTENSION: ICD-10-CM

## 2024-04-30 DIAGNOSIS — E03.9 ACQUIRED HYPOTHYROIDISM: Primary | ICD-10-CM

## 2024-04-30 DIAGNOSIS — F41.9 ANXIETY: ICD-10-CM

## 2024-04-30 RX ORDER — LEVOTHYROXINE SODIUM 0.12 MG/1
125 TABLET ORAL
Qty: 30 TABLET | Refills: 2 | Status: SHIPPED | OUTPATIENT
Start: 2024-04-30

## 2024-04-30 RX ORDER — VENLAFAXINE HYDROCHLORIDE 150 MG/1
150 CAPSULE, EXTENDED RELEASE ORAL DAILY
Qty: 90 CAPSULE | Refills: 3 | Status: SHIPPED | OUTPATIENT
Start: 2024-04-30

## 2024-04-30 RX ORDER — AMLODIPINE BESYLATE 5 MG/1
TABLET ORAL
Qty: 90 TABLET | Refills: 0 | OUTPATIENT
Start: 2024-04-30

## 2024-04-30 RX ORDER — ROSUVASTATIN CALCIUM 20 MG/1
20 TABLET, COATED ORAL NIGHTLY
Qty: 90 TABLET | Refills: 0 | Status: SHIPPED | OUTPATIENT
Start: 2024-04-30

## 2024-04-30 RX ORDER — AMLODIPINE BESYLATE 5 MG/1
5 TABLET ORAL DAILY
Qty: 90 TABLET | Refills: 0 | Status: SHIPPED | OUTPATIENT
Start: 2024-04-30

## 2024-05-03 ENCOUNTER — HOSPITAL ENCOUNTER (EMERGENCY)
Facility: HOSPITAL | Age: 69
Discharge: HOME | End: 2024-05-03
Attending: EMERGENCY MEDICINE
Payer: COMMERCIAL

## 2024-05-03 VITALS
HEART RATE: 74 BPM | WEIGHT: 150 LBS | HEIGHT: 63 IN | RESPIRATION RATE: 18 BRPM | DIASTOLIC BLOOD PRESSURE: 87 MMHG | TEMPERATURE: 98.1 F | SYSTOLIC BLOOD PRESSURE: 119 MMHG | BODY MASS INDEX: 26.58 KG/M2 | OXYGEN SATURATION: 98 %

## 2024-05-03 DIAGNOSIS — S16.1XXA STRAIN OF NECK MUSCLE, INITIAL ENCOUNTER: Primary | ICD-10-CM
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
## 2024-05-03 DIAGNOSIS — M25.551 RIGHT HIP PAIN: ICD-10-CM
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
## 2024-05-03 PROCEDURE — 99282 EMERGENCY DEPT VISIT SF MDM: CPT

## 2024-05-03 RX ORDER — METHOCARBAMOL 750 MG/1
1500 TABLET, FILM COATED ORAL 3 TIMES DAILY PRN
Qty: 30 TABLET | Refills: 0 | Status: SHIPPED | OUTPATIENT
Start: 2024-05-03

## 2024-05-03 RX ORDER — DICLOFENAC SODIUM 75 MG/1
75 TABLET, DELAYED RELEASE ORAL 2 TIMES DAILY
Qty: 14 TABLET | Refills: 0 | Status: SHIPPED | OUTPATIENT
Start: 2024-05-03
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
## 2024-05-19 DIAGNOSIS — M19.042 PRIMARY OSTEOARTHRITIS OF BOTH HANDS: ICD-10-CM

## 2024-05-19 DIAGNOSIS — M19.041 PRIMARY OSTEOARTHRITIS OF BOTH HANDS: ICD-10-CM

## 2024-05-21 ENCOUNTER — TELEPHONE (OUTPATIENT)
Dept: FAMILY MEDICINE CLINIC | Facility: CLINIC | Age: 69
End: 2024-05-21
Payer: MEDICARE

## 2024-05-21 RX ORDER — MELOXICAM 7.5 MG/1
7.5 TABLET ORAL DAILY
Qty: 30 TABLET | Refills: 2 | OUTPATIENT
Start: 2024-05-21

## 2024-05-21 NOTE — TELEPHONE ENCOUNTER
Per Alicia Jensen, DAYAMI-       I received a refill request for meloxicam for patient. However her med list has diclofenac also listed. She cannot take both of these meds at the same time. Please find out which medication she is actually taking

## 2024-05-28 ENCOUNTER — OFFICE VISIT (OUTPATIENT)
Dept: FAMILY MEDICINE CLINIC | Facility: CLINIC | Age: 69
End: 2024-05-28
Payer: MEDICARE

## 2024-05-28 VITALS
HEIGHT: 63 IN | BODY MASS INDEX: 27.5 KG/M2 | DIASTOLIC BLOOD PRESSURE: 64 MMHG | SYSTOLIC BLOOD PRESSURE: 102 MMHG | OXYGEN SATURATION: 100 % | WEIGHT: 155.2 LBS | RESPIRATION RATE: 22 BRPM | HEART RATE: 82 BPM | TEMPERATURE: 98 F

## 2024-05-28 DIAGNOSIS — R09.81 NASAL SINUS CONGESTION: Primary | ICD-10-CM

## 2024-05-28 DIAGNOSIS — M19.042 PRIMARY OSTEOARTHRITIS OF BOTH HANDS: ICD-10-CM

## 2024-05-28 DIAGNOSIS — M19.041 PRIMARY OSTEOARTHRITIS OF BOTH HANDS: ICD-10-CM

## 2024-05-28 PROCEDURE — 1126F AMNT PAIN NOTED NONE PRSNT: CPT | Performed by: NURSE PRACTITIONER

## 2024-05-28 PROCEDURE — 3074F SYST BP LT 130 MM HG: CPT | Performed by: NURSE PRACTITIONER

## 2024-05-28 PROCEDURE — G2211 COMPLEX E/M VISIT ADD ON: HCPCS | Performed by: NURSE PRACTITIONER

## 2024-05-28 PROCEDURE — 99213 OFFICE O/P EST LOW 20 MIN: CPT | Performed by: NURSE PRACTITIONER

## 2024-05-28 PROCEDURE — 1160F RVW MEDS BY RX/DR IN RCRD: CPT | Performed by: NURSE PRACTITIONER

## 2024-05-28 PROCEDURE — 1159F MED LIST DOCD IN RCRD: CPT | Performed by: NURSE PRACTITIONER

## 2024-05-28 PROCEDURE — 3078F DIAST BP <80 MM HG: CPT | Performed by: NURSE PRACTITIONER

## 2024-05-28 RX ORDER — AZELASTINE HYDROCHLORIDE 137 UG/1
2 SPRAY, METERED NASAL DAILY PRN
Qty: 30 ML | Refills: 1 | Status: SHIPPED | OUTPATIENT
Start: 2024-05-28

## 2024-05-28 RX ORDER — MELOXICAM 7.5 MG/1
7.5 TABLET ORAL DAILY
Qty: 30 TABLET | Refills: 2 | Status: SHIPPED | OUTPATIENT
Start: 2024-05-28
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
## 2024-06-14 DIAGNOSIS — R09.81 NASAL SINUS CONGESTION: ICD-10-CM

## 2024-06-14 RX ORDER — AZELASTINE HYDROCHLORIDE 137 UG/1
2 SPRAY, METERED NASAL DAILY PRN
Qty: 30 ML | Refills: 1 | Status: SHIPPED | OUTPATIENT
Start: 2024-06-14

## 2024-06-14 NOTE — TELEPHONE ENCOUNTER
Rx Refill Note  Requested Prescriptions     Pending Prescriptions Disp Refills    Azelastine HCl 137 MCG/SPRAY solution 30 mL 1     Si sprays into the nostril(s) as directed by provider Daily As Needed (nasal congestion).      Last office visit with prescribing clinician: 2024   Last telemedicine visit with prescribing clinician: Visit date not found   Next office visit with prescribing clinician: Visit date not found                         Would you like a call back once the refill request has been completed: [] Yes [] No    If the office needs to give you a call back, can they leave a voicemail: [] Yes [] No    Jessie Durand MA  24, 11:11 EDT

## 2024-06-21 DIAGNOSIS — I10 ESSENTIAL HYPERTENSION: ICD-10-CM

## 2024-06-21 RX ORDER — EZETIMIBE 10 MG/1
10 TABLET ORAL DAILY
Qty: 90 TABLET | Refills: 1 | Status: SHIPPED | OUTPATIENT
Start: 2024-06-21

## 2024-06-21 RX ORDER — LOSARTAN POTASSIUM AND HYDROCHLOROTHIAZIDE 25; 100 MG/1; MG/1
1 TABLET ORAL DAILY
Qty: 90 TABLET | Refills: 3 | Status: SHIPPED | OUTPATIENT
Start: 2024-06-21

## 2024-07-27 DIAGNOSIS — E03.9 ACQUIRED HYPOTHYROIDISM: ICD-10-CM

## 2024-07-27 DIAGNOSIS — I10 ESSENTIAL HYPERTENSION: ICD-10-CM

## 2024-07-29 RX ORDER — AMLODIPINE BESYLATE 5 MG/1
5 TABLET ORAL DAILY
Qty: 30 TABLET | Refills: 0 | Status: SHIPPED | OUTPATIENT
Start: 2024-07-29

## 2024-07-29 RX ORDER — LEVOTHYROXINE SODIUM 0.12 MG/1
125 TABLET ORAL
Qty: 30 TABLET | Refills: 0 | Status: SHIPPED | OUTPATIENT
Start: 2024-07-29

## 2024-08-05 ENCOUNTER — TELEPHONE (OUTPATIENT)
Dept: CARDIOLOGY | Facility: CLINIC | Age: 69
End: 2024-08-05
Payer: MEDICARE

## 2024-08-05 NOTE — TELEPHONE ENCOUNTER
LVM for patient regarding cardiac clearance for ИРИНА.     Patient will need to hold ASA 3 days prior to procedure.   Cardiac clearance form faxed to [phone] and confirmed at this time. Form will be sent to scan into chart.

## 2024-08-16 ENCOUNTER — TELEMEDICINE (OUTPATIENT)
Dept: FAMILY MEDICINE CLINIC | Facility: CLINIC | Age: 69
End: 2024-08-16
Payer: MEDICARE

## 2024-08-16 DIAGNOSIS — Z90.2 HISTORY OF LOBECTOMY OF LUNG: ICD-10-CM

## 2024-08-16 DIAGNOSIS — U07.1 COVID-19 VIRUS INFECTION: Primary | ICD-10-CM

## 2024-08-16 PROCEDURE — 99214 OFFICE O/P EST MOD 30 MIN: CPT | Performed by: FAMILY MEDICINE

## 2024-08-16 PROCEDURE — 1126F AMNT PAIN NOTED NONE PRSNT: CPT | Performed by: FAMILY MEDICINE

## 2024-08-16 NOTE — PROGRESS NOTES
Telehealth E-Visit      Date: 2024   Patient Name: Linn Bean  : 1955   MRN: [id number]     Chief Complaint:    Chief Complaint   Patient presents with    covid positive     Home testing       This provider is located at the [facility], [address] [place] [address], [address] [place] [address] using a secure Audiosocket Video Visit through [employer]. Patient is being seen remotely via telehealth at their home address in [place], and stated they are in a secure environment for this session. The patient's condition being diagnosed/treated is appropriate for telemedicine. The provider identified herself as well as her credentials. The patient, and/or patients guardian, consent to be seen remotely, and when consent is given they understand that the consent allows for patient identifiable information to be sent to a third party as needed. They may refuse to be seen remotely at any time. The electronic data is encrypted and password protected, and the patient and/or guardian has been advised of the potential risks to privacy not withstanding such measures.    You have chosen to receive care through a telehealth visit. Do you consent to use a video/audio connection for your medical care today? Yes    Doximity was used as patient could not connect through Audiosocket, likely due to security on her laptop.      History of Present Illness: Linn Bean is a 69 y.o. female who is here today and reports she tested positive for covid.      Patient previously seeing Alicia for pcp.    Scheduled for home test positive for COVID yesterday.  Reports daughter and her  have covid as well.  Reports their symptoms started prior to hers.    Patient reports congestion fever, fatigue and productive cough. And HA.    History of lung cancer and reports she had her left lower lobe removed.  Reports she had adenocarcinoma.    Does not smoke.  Smoked about 10 years and stopped 40 years     Taking  mucinex yesterday and already was taking flonase.        Subjective      Review of Systems:   Review of Systems   Constitutional:  Positive for fatigue and fever.   HENT:  Positive for congestion.    Respiratory:  Positive for cough.        I have reviewed and the following portions of the patient's history were updated as appropriate: past family history, past medical history, past social history, past surgical history and problem list.    Medications:     Current Outpatient Medications:     acetaminophen (TYLENOL) 325 MG tablet, Take 2 tablets by mouth., Disp: , Rfl:     albuterol sulfate  (90 Base) MCG/ACT inhaler, Inhale 2 puffs Every 4 (Four) Hours As Needed for Wheezing., Disp: 8 g, Rfl: 0    amLODIPine (NORVASC) 5 MG tablet, TAKE 1 TABLET BY MOUTH DAILY, Disp: 30 tablet, Rfl: 0    aspirin 81 MG EC tablet, Take 1 tablet by mouth Daily., Disp: , Rfl:     Azelastine HCl 137 MCG/SPRAY solution, 2 sprays into the nostril(s) as directed by provider Daily As Needed (nasal congestion)., Disp: 30 mL, Rfl: 1    B Complex Vitamins (VITAMIN B COMPLEX PO), , Disp: , Rfl:     calcium carbonate (OS-SAAD) 600 MG tablet, Take 1 tablet by mouth Daily., Disp: , Rfl:     Diclofenac Sodium (VOLTAREN) 1 % gel gel, Apply 2 g topically to the appropriate area as directed., Disp: , Rfl:     ezetimibe (ZETIA) 10 MG tablet, Take 1 tablet by mouth Daily., Disp: 90 tablet, Rfl: 1    hydrOXYzine (ATARAX) 25 MG tablet, Take 1 tablet by mouth At Night As Needed (insomnia)., Disp: 30 tablet, Rfl: 1    levothyroxine (SYNTHROID, LEVOTHROID) 125 MCG tablet, TAKE 1 TABLET BY MOUTH EVERY MORNING, Disp: 30 tablet, Rfl: 0    losartan-hydrochlorothiazide (HYZAAR) 100-25 MG per tablet, Take 1 tablet by mouth Daily., Disp: 90 tablet, Rfl: 3    meloxicam (Mobic) 7.5 MG tablet, Take 1 tablet by mouth Daily., Disp: 30 tablet, Rfl: 2    methocarbamol (ROBAXIN) 750 MG tablet, Take 2 tablets by mouth 3 (Three) Times a Day As Needed for Muscle Spasms.,  Disp: 30 tablet, Rfl: 0    multivitamin with minerals tablet tablet, Take 1 tablet by mouth Daily., Disp: , Rfl:     Nirmatrelvir & Ritonavir, 300mg/100mg, (PAXLOVID), Take 3 tablets by mouth 2 (Two) Times a Day for 5 days., Disp: 30 tablet, Rfl: 0    omeprazole (priLOSEC) 40 MG capsule, Take 1 capsule by mouth Daily., Disp: 90 capsule, Rfl: 2    Probiotic Product (PROBIOTIC DAILY PO), Take  by mouth., Disp: , Rfl:     rosuvastatin (CRESTOR) 20 MG tablet, Take 1 tablet by mouth Every Night., Disp: 90 tablet, Rfl: 0    venlafaxine XR (EFFEXOR-XR) 150 MG 24 hr capsule, Take 1 capsule by mouth Daily., Disp: 90 capsule, Rfl: 3    Vitamin D, Cholecalciferol, (CHOLECALCIFEROL) 10 MCG (400 UNIT) tablet, Take 2 tablets by mouth Daily., Disp: , Rfl:     Allergies:   Allergies   Allergen Reactions    Ace Inhibitors Cough    Codeine Itching       Objective     Physical Exam:  Vital Signs: There were no vitals filed for this visit.  There is no height or weight on file to calculate BMI.    Physical Exam  Constitutional:       Appearance: She is ill-appearing.   HENT:      Head: Normocephalic and atraumatic.      Right Ear: External ear normal.      Left Ear: External ear normal.   Pulmonary:      Effort: No respiratory distress.      Comments: Coughing some  Psychiatric:         Mood and Affect: Mood normal.           Assessment / Plan      Assessment/Plan:     Problems Addressed this Visit       History of lobectomy of lung     Other Visit Diagnoses       COVID-19 virus infection    -  Primary    Relevant Medications    Nirmatrelvir & Ritonavir, 300mg/100mg, (PAXLOVID)          Diagnoses         Codes Comments    COVID-19 virus infection    -  Primary ICD-10-CM: U07.1  ICD-9-CM: 079.89     History of lobectomy of lung     ICD-10-CM: Z90.2  ICD-9-CM: V12.59              Medication list reviewed to evaluate for drug interactions.  Patient to hold Crestor while taking Paxlovid.  Also to take Effexor less frequently as discussed  in office.      Discussed with patient:  Start Paxlovid as ordered.  Hold Crestor while taking Paxlovid.  Take Effexor less frequently while taking Paxlovid, as discussed during visit.  Drink plenty fluids.  Tylenol if needed for fever.  Can continue use and aches.  Can take antihistamine such as Zyrtec or Xyzal daily.  Can take Mucinex if needed.  Discussed taking vitamin D, vitamin C, zinc to help with immune system.  Also encourage patient to get plenty rest.  Discussed monitoring oxygen level with pulse oximeter and monitoring for symptoms of hypoxemia.  Discussed if these occur, she should go to ER.  Patient expressed understanding and agrees to do so.  .            Follow Up:   No follow-ups on file.    Any medications prescribed have been sent electronically to   [facility] [id number] - [place], [place] - [address] [facility] [address] [facility] - [phone] PH - [phone] FX  [address] [facility] [address]  Phone: [phone] Fax: [phone]      30 minutes were spent formulating a treatment plan, and relaying information to the patient via compareit[employer]met.    Andrzej Stahl DO  [facility] [place]   08/18/24  01:32 EDT

## 2024-08-18 DIAGNOSIS — Z78.0 POST-MENOPAUSAL: Primary | ICD-10-CM

## 2024-08-31 DIAGNOSIS — M19.041 PRIMARY OSTEOARTHRITIS OF BOTH HANDS: ICD-10-CM

## 2024-08-31 DIAGNOSIS — M19.042 PRIMARY OSTEOARTHRITIS OF BOTH HANDS: ICD-10-CM

## 2024-08-31 DIAGNOSIS — E03.9 ACQUIRED HYPOTHYROIDISM: ICD-10-CM

## 2024-08-31 NOTE — TELEPHONE ENCOUNTER
Patient is totally out of medicine. Patient has appointment to establish care with Anny in oct.      On call provider

## 2024-09-03 RX ORDER — MELOXICAM 7.5 MG/1
7.5 TABLET ORAL DAILY
Qty: 30 TABLET | Refills: 2 | Status: SHIPPED | OUTPATIENT
Start: 2024-09-03

## 2024-09-03 RX ORDER — LEVOTHYROXINE SODIUM 125 UG/1
125 TABLET ORAL
Qty: 30 TABLET | Refills: 2 | Status: SHIPPED | OUTPATIENT
Start: 2024-09-03

## 2024-09-04 DIAGNOSIS — I10 ESSENTIAL HYPERTENSION: ICD-10-CM

## 2024-09-04 DIAGNOSIS — E03.9 ACQUIRED HYPOTHYROIDISM: ICD-10-CM

## 2024-09-04 RX ORDER — LEVOTHYROXINE SODIUM 125 UG/1
125 TABLET ORAL
Qty: 30 TABLET | Refills: 2 | Status: CANCELLED | OUTPATIENT
Start: 2024-09-04

## 2024-09-04 RX ORDER — AMLODIPINE BESYLATE 5 MG/1
5 TABLET ORAL DAILY
Qty: 30 TABLET | Refills: 0 | Status: SHIPPED | OUTPATIENT
Start: 2024-09-04

## 2024-09-09 ENCOUNTER — PATIENT ROUNDING (BHMG ONLY) (OUTPATIENT)
Dept: URGENT CARE | Facility: CLINIC | Age: 69
End: 2024-09-09
Payer: MEDICARE

## 2024-09-11 ENCOUNTER — OFFICE VISIT (OUTPATIENT)
Dept: FAMILY MEDICINE CLINIC | Facility: CLINIC | Age: 69
End: 2024-09-11
Payer: MEDICARE

## 2024-09-11 VITALS
HEART RATE: 88 BPM | HEIGHT: 63 IN | OXYGEN SATURATION: 98 % | DIASTOLIC BLOOD PRESSURE: 76 MMHG | WEIGHT: 155 LBS | TEMPERATURE: 98.2 F | BODY MASS INDEX: 27.46 KG/M2 | SYSTOLIC BLOOD PRESSURE: 118 MMHG

## 2024-09-11 DIAGNOSIS — L03.012 CELLULITIS OF LEFT INDEX FINGER: Primary | ICD-10-CM

## 2024-09-11 PROCEDURE — 1160F RVW MEDS BY RX/DR IN RCRD: CPT | Performed by: PHYSICIAN ASSISTANT

## 2024-09-11 PROCEDURE — 1125F AMNT PAIN NOTED PAIN PRSNT: CPT | Performed by: PHYSICIAN ASSISTANT

## 2024-09-11 PROCEDURE — 3074F SYST BP LT 130 MM HG: CPT | Performed by: PHYSICIAN ASSISTANT

## 2024-09-11 PROCEDURE — 1159F MED LIST DOCD IN RCRD: CPT | Performed by: PHYSICIAN ASSISTANT

## 2024-09-11 PROCEDURE — 96372 THER/PROPH/DIAG INJ SC/IM: CPT | Performed by: PHYSICIAN ASSISTANT

## 2024-09-11 PROCEDURE — 3078F DIAST BP <80 MM HG: CPT | Performed by: PHYSICIAN ASSISTANT

## 2024-09-11 PROCEDURE — 99213 OFFICE O/P EST LOW 20 MIN: CPT | Performed by: PHYSICIAN ASSISTANT

## 2024-09-11 RX ORDER — CEFTRIAXONE 1 G/1
1 INJECTION, POWDER, FOR SOLUTION INTRAMUSCULAR; INTRAVENOUS ONCE
Status: COMPLETED | OUTPATIENT
Start: 2024-09-11 | End: 2024-09-11

## 2024-09-11 RX ADMIN — CEFTRIAXONE 1 G: 1 INJECTION, POWDER, FOR SOLUTION INTRAMUSCULAR; INTRAVENOUS at 17:39

## 2024-09-13 ENCOUNTER — OFFICE VISIT (OUTPATIENT)
Dept: FAMILY MEDICINE CLINIC | Facility: CLINIC | Age: 69
End: 2024-09-13
Payer: MEDICARE

## 2024-09-13 VITALS
TEMPERATURE: 98.2 F | BODY MASS INDEX: 27.14 KG/M2 | RESPIRATION RATE: 20 BRPM | OXYGEN SATURATION: 99 % | HEART RATE: 78 BPM | SYSTOLIC BLOOD PRESSURE: 120 MMHG | DIASTOLIC BLOOD PRESSURE: 76 MMHG | HEIGHT: 63 IN | WEIGHT: 153.2 LBS

## 2024-09-13 DIAGNOSIS — L03.012 FELON OF FINGER OF LEFT HAND: Primary | ICD-10-CM

## 2024-09-13 PROCEDURE — 99215 OFFICE O/P EST HI 40 MIN: CPT | Performed by: STUDENT IN AN ORGANIZED HEALTH CARE EDUCATION/TRAINING PROGRAM

## 2024-09-13 PROCEDURE — 1125F AMNT PAIN NOTED PAIN PRSNT: CPT | Performed by: STUDENT IN AN ORGANIZED HEALTH CARE EDUCATION/TRAINING PROGRAM

## 2024-09-13 PROCEDURE — 3078F DIAST BP <80 MM HG: CPT | Performed by: STUDENT IN AN ORGANIZED HEALTH CARE EDUCATION/TRAINING PROGRAM

## 2024-09-13 PROCEDURE — 3074F SYST BP LT 130 MM HG: CPT | Performed by: STUDENT IN AN ORGANIZED HEALTH CARE EDUCATION/TRAINING PROGRAM

## 2024-09-15 ENCOUNTER — READMISSION MANAGEMENT (OUTPATIENT)
Dept: CALL CENTER | Facility: HOSPITAL | Age: 69
End: 2024-09-15
Payer: MEDICARE

## 2024-09-15 NOTE — OUTREACH NOTE
Prep Survey      Flowsheet Row Responses   [facility] facility patient discharged from? Non-BH   Is LACE score < 7 ? Non-BH Discharge   Eligibility Not Eligible   What are the reasons patient is not eligible? Other  [no [facility] pcp appt]   Does the patient have one of the following disease processes/diagnoses(primary or secondary)? Other   Prep survey completed? Yes            BERTHA HARRIS - Registered Nurse

## 2024-09-29 DIAGNOSIS — I10 ESSENTIAL HYPERTENSION: ICD-10-CM

## 2024-09-30 RX ORDER — AMLODIPINE BESYLATE 5 MG/1
5 TABLET ORAL DAILY
Qty: 30 TABLET | Refills: 0 | Status: SHIPPED | OUTPATIENT
Start: 2024-09-30

## 2024-10-07 ENCOUNTER — PATIENT ROUNDING (BHMG ONLY) (OUTPATIENT)
Dept: URGENT CARE | Facility: CLINIC | Age: 69
End: 2024-10-07
Payer: MEDICARE

## 2024-10-25 DIAGNOSIS — I10 ESSENTIAL HYPERTENSION: ICD-10-CM

## 2024-10-25 RX ORDER — AMLODIPINE BESYLATE 5 MG/1
5 TABLET ORAL DAILY
Qty: 30 TABLET | Refills: 0 | Status: SHIPPED | OUTPATIENT
Start: 2024-10-25

## 2024-11-12 ENCOUNTER — OFFICE VISIT (OUTPATIENT)
Dept: FAMILY MEDICINE CLINIC | Facility: CLINIC | Age: 69
End: 2024-11-12
Payer: MEDICARE

## 2024-11-12 ENCOUNTER — LAB (OUTPATIENT)
Dept: LAB | Facility: HOSPITAL | Age: 69
End: 2024-11-12
Payer: MEDICARE

## 2024-11-12 VITALS
HEIGHT: 63 IN | WEIGHT: 155.4 LBS | BODY MASS INDEX: 27.54 KG/M2 | HEART RATE: 60 BPM | DIASTOLIC BLOOD PRESSURE: 74 MMHG | RESPIRATION RATE: 20 BRPM | OXYGEN SATURATION: 97 % | SYSTOLIC BLOOD PRESSURE: 120 MMHG | TEMPERATURE: 98.4 F

## 2024-11-12 DIAGNOSIS — I10 PRIMARY HYPERTENSION: Primary | ICD-10-CM

## 2024-11-12 DIAGNOSIS — M19.041 PRIMARY OSTEOARTHRITIS OF BOTH HANDS: ICD-10-CM

## 2024-11-12 DIAGNOSIS — F41.1 GAD (GENERALIZED ANXIETY DISORDER): ICD-10-CM

## 2024-11-12 DIAGNOSIS — M19.042 PRIMARY OSTEOARTHRITIS OF BOTH HANDS: ICD-10-CM

## 2024-11-12 DIAGNOSIS — E03.9 ACQUIRED HYPOTHYROIDISM: ICD-10-CM

## 2024-11-12 DIAGNOSIS — E78.2 MIXED HYPERLIPIDEMIA: ICD-10-CM

## 2024-11-12 DIAGNOSIS — M54.9 BACK PAIN, UNSPECIFIED BACK LOCATION, UNSPECIFIED BACK PAIN LATERALITY, UNSPECIFIED CHRONICITY: ICD-10-CM

## 2024-11-12 DIAGNOSIS — I10 PRIMARY HYPERTENSION: ICD-10-CM

## 2024-11-12 DIAGNOSIS — R09.81 NASAL SINUS CONGESTION: ICD-10-CM

## 2024-11-12 DIAGNOSIS — Z23 IMMUNIZATION DUE: ICD-10-CM

## 2024-11-12 DIAGNOSIS — K59.00 CONSTIPATION, UNSPECIFIED CONSTIPATION TYPE: ICD-10-CM

## 2024-11-12 PROBLEM — J32.9 SINUSITIS: Status: RESOLVED | Noted: 2022-12-20 | Resolved: 2024-11-12

## 2024-11-12 PROBLEM — J06.9 UPPER RESPIRATORY TRACT INFECTION: Status: RESOLVED | Noted: 2021-12-30 | Resolved: 2024-11-12

## 2024-11-12 PROBLEM — E04.1 NONTOXIC UNINODULAR GOITER: Status: RESOLVED | Noted: 2022-12-20 | Resolved: 2024-11-12

## 2024-11-12 PROBLEM — F41.9 ANXIETY: Status: RESOLVED | Noted: 2022-12-20 | Resolved: 2024-11-12

## 2024-11-12 PROBLEM — E78.5 DYSLIPIDEMIA: Status: RESOLVED | Noted: 2022-12-20 | Resolved: 2024-11-12

## 2024-11-12 PROBLEM — R00.2 PALPITATION: Status: RESOLVED | Noted: 2022-11-08 | Resolved: 2024-11-12

## 2024-11-12 LAB
ALBUMIN SERPL-MCNC: 4.4 G/DL (ref 3.5–5.2)
ALBUMIN/GLOB SERPL: 1.8 G/DL
ALP SERPL-CCNC: 70 U/L (ref 39–117)
ALT SERPL W P-5'-P-CCNC: 24 U/L (ref 1–33)
ANION GAP SERPL CALCULATED.3IONS-SCNC: 11.4 MMOL/L (ref 5–15)
AST SERPL-CCNC: 24 U/L (ref 1–32)
BASOPHILS # BLD AUTO: 0.09 10*3/MM3 (ref 0–0.2)
BASOPHILS NFR BLD AUTO: 1.3 % (ref 0–1.5)
BILIRUB BLD-MCNC: ABNORMAL MG/DL
BILIRUB SERPL-MCNC: 0.3 MG/DL (ref 0–1.2)
BUN SERPL-MCNC: 13 MG/DL (ref 8–23)
BUN/CREAT SERPL: 20.6 (ref 7–25)
CALCIUM SPEC-SCNC: 9.7 MG/DL (ref 8.6–10.5)
CHLORIDE SERPL-SCNC: 106 MMOL/L (ref 98–107)
CLARITY, POC: CLEAR
CO2 SERPL-SCNC: 26.6 MMOL/L (ref 22–29)
COLOR UR: YELLOW
CREAT SERPL-MCNC: 0.63 MG/DL (ref 0.57–1)
DEPRECATED RDW RBC AUTO: 41.9 FL (ref 37–54)
EGFRCR SERPLBLD CKD-EPI 2021: 97.4 ML/MIN/1.73
EOSINOPHIL # BLD AUTO: 0.45 10*3/MM3 (ref 0–0.4)
EOSINOPHIL NFR BLD AUTO: 6.4 % (ref 0.3–6.2)
ERYTHROCYTE [DISTWIDTH] IN BLOOD BY AUTOMATED COUNT: 12.3 % (ref 12.3–15.4)
EXPIRATION DATE: ABNORMAL
GLOBULIN UR ELPH-MCNC: 2.5 GM/DL
GLUCOSE SERPL-MCNC: 104 MG/DL (ref 65–99)
GLUCOSE UR STRIP-MCNC: NEGATIVE MG/DL
HCT VFR BLD AUTO: 40.1 % (ref 34–46.6)
HGB BLD-MCNC: 13 G/DL (ref 12–15.9)
IMM GRANULOCYTES # BLD AUTO: 0.03 10*3/MM3 (ref 0–0.05)
IMM GRANULOCYTES NFR BLD AUTO: 0.4 % (ref 0–0.5)
KETONES UR QL: NEGATIVE
LEUKOCYTE EST, POC: NEGATIVE
LYMPHOCYTES # BLD AUTO: 1.62 10*3/MM3 (ref 0.7–3.1)
LYMPHOCYTES NFR BLD AUTO: 23 % (ref 19.6–45.3)
Lab: ABNORMAL
MCH RBC QN AUTO: 30.8 PG (ref 26.6–33)
MCHC RBC AUTO-ENTMCNC: 32.4 G/DL (ref 31.5–35.7)
MCV RBC AUTO: 95 FL (ref 79–97)
MONOCYTES # BLD AUTO: 1.01 10*3/MM3 (ref 0.1–0.9)
MONOCYTES NFR BLD AUTO: 14.4 % (ref 5–12)
NEUTROPHILS NFR BLD AUTO: 3.83 10*3/MM3 (ref 1.7–7)
NEUTROPHILS NFR BLD AUTO: 54.5 % (ref 42.7–76)
NITRITE UR-MCNC: NEGATIVE MG/ML
NRBC BLD AUTO-RTO: 0 /100 WBC (ref 0–0.2)
PH UR: 6 [PH] (ref 5–8)
PLATELET # BLD AUTO: 283 10*3/MM3 (ref 140–450)
PMV BLD AUTO: 10.3 FL (ref 6–12)
POTASSIUM SERPL-SCNC: 4 MMOL/L (ref 3.5–5.2)
PROT SERPL-MCNC: 6.9 G/DL (ref 6–8.5)
PROT UR STRIP-MCNC: ABNORMAL MG/DL
RBC # BLD AUTO: 4.22 10*6/MM3 (ref 3.77–5.28)
RBC # UR STRIP: NEGATIVE /UL
SODIUM SERPL-SCNC: 144 MMOL/L (ref 136–145)
SP GR UR: 1.01 (ref 1–1.03)
T4 FREE SERPL-MCNC: 1 NG/DL (ref 0.92–1.68)
TSH SERPL DL<=0.05 MIU/L-ACNC: 10.2 UIU/ML (ref 0.27–4.2)
UROBILINOGEN UR QL: ABNORMAL
WBC NRBC COR # BLD AUTO: 7.03 10*3/MM3 (ref 3.4–10.8)

## 2024-11-12 PROCEDURE — 80053 COMPREHEN METABOLIC PANEL: CPT

## 2024-11-12 PROCEDURE — 90677 PCV20 VACCINE IM: CPT | Performed by: FAMILY MEDICINE

## 2024-11-12 PROCEDURE — 36415 COLL VENOUS BLD VENIPUNCTURE: CPT

## 2024-11-12 PROCEDURE — 84443 ASSAY THYROID STIM HORMONE: CPT

## 2024-11-12 PROCEDURE — 90662 IIV NO PRSV INCREASED AG IM: CPT | Performed by: FAMILY MEDICINE

## 2024-11-12 PROCEDURE — 3074F SYST BP LT 130 MM HG: CPT | Performed by: FAMILY MEDICINE

## 2024-11-12 PROCEDURE — 1160F RVW MEDS BY RX/DR IN RCRD: CPT | Performed by: FAMILY MEDICINE

## 2024-11-12 PROCEDURE — 81003 URINALYSIS AUTO W/O SCOPE: CPT | Performed by: FAMILY MEDICINE

## 2024-11-12 PROCEDURE — G0008 ADMIN INFLUENZA VIRUS VAC: HCPCS | Performed by: FAMILY MEDICINE

## 2024-11-12 PROCEDURE — 1126F AMNT PAIN NOTED NONE PRSNT: CPT | Performed by: FAMILY MEDICINE

## 2024-11-12 PROCEDURE — 84439 ASSAY OF FREE THYROXINE: CPT

## 2024-11-12 PROCEDURE — 1159F MED LIST DOCD IN RCRD: CPT | Performed by: FAMILY MEDICINE

## 2024-11-12 PROCEDURE — 3078F DIAST BP <80 MM HG: CPT | Performed by: FAMILY MEDICINE

## 2024-11-12 PROCEDURE — 85025 COMPLETE CBC W/AUTO DIFF WBC: CPT

## 2024-11-12 PROCEDURE — 99214 OFFICE O/P EST MOD 30 MIN: CPT | Performed by: FAMILY MEDICINE

## 2024-11-12 PROCEDURE — G0009 ADMIN PNEUMOCOCCAL VACCINE: HCPCS | Performed by: FAMILY MEDICINE

## 2024-11-12 RX ORDER — ROSUVASTATIN CALCIUM 20 MG/1
20 TABLET, COATED ORAL NIGHTLY
Qty: 90 TABLET | Refills: 0 | Status: SHIPPED | OUTPATIENT
Start: 2024-11-12

## 2024-11-12 RX ORDER — MELOXICAM 15 MG/1
15 TABLET ORAL DAILY
Qty: 90 TABLET | Refills: 0 | Status: SHIPPED | OUTPATIENT
Start: 2024-11-12

## 2024-11-12 RX ORDER — LOSARTAN POTASSIUM AND HYDROCHLOROTHIAZIDE 25; 100 MG/1; MG/1
1 TABLET ORAL DAILY
Qty: 90 TABLET | Refills: 0 | Status: SHIPPED | OUTPATIENT
Start: 2024-11-12

## 2024-11-12 RX ORDER — LEVOTHYROXINE SODIUM 125 UG/1
125 TABLET ORAL
Qty: 90 TABLET | Refills: 0 | Status: SHIPPED | OUTPATIENT
Start: 2024-11-12 | End: 2024-11-13

## 2024-11-12 RX ORDER — VENLAFAXINE HYDROCHLORIDE 150 MG/1
150 CAPSULE, EXTENDED RELEASE ORAL DAILY
Qty: 90 CAPSULE | Refills: 0 | Status: SHIPPED | OUTPATIENT
Start: 2024-11-12

## 2024-11-12 RX ORDER — EZETIMIBE 10 MG/1
10 TABLET ORAL DAILY
Qty: 90 TABLET | Refills: 0 | Status: SHIPPED | OUTPATIENT
Start: 2024-11-12

## 2024-11-12 RX ORDER — AMLODIPINE BESYLATE 5 MG/1
5 TABLET ORAL DAILY
Qty: 90 TABLET | Refills: 0 | Status: SHIPPED | OUTPATIENT
Start: 2024-11-12

## 2024-11-12 RX ORDER — AZELASTINE HYDROCHLORIDE 137 UG/1
2 SPRAY, METERED NASAL DAILY PRN
Qty: 30 ML | Refills: 1 | Status: SHIPPED | OUTPATIENT
Start: 2024-11-12

## 2024-11-12 RX ORDER — ONDANSETRON 4 MG/1
8 TABLET, FILM COATED ORAL
COMMUNITY
Start: 2024-11-08

## 2024-11-12 RX ORDER — HYDROXYZINE PAMOATE 25 MG/1
25 CAPSULE ORAL NIGHTLY PRN
Qty: 90 CAPSULE | Refills: 0 | Status: SHIPPED | OUTPATIENT
Start: 2024-11-12

## 2024-11-12 RX ORDER — METOPROLOL TARTRATE 50 MG
50 TABLET ORAL 2 TIMES DAILY
Qty: 180 TABLET | Refills: 0 | Status: SHIPPED | OUTPATIENT
Start: 2024-11-12

## 2024-11-12 NOTE — PROGRESS NOTES
Subjective     Chief Complaint  Establish Care and GI Problem (X 2 weeks)    Subjective          Linn Fady Bean is a 69 y.o. female who presents today to [facility] for follow up.    HPI:   History of Present Illness    Mr. Bean is a pleasant 69-year-old female who presents today to follow-up on chronic medical conditions.    She has a past medical history of hypertension, hyperlipidemia, hypothyroidism, GERD, adenocarcinoma of the lung, DCIS of right breast, chronic neck pain, obstructive sleep apnea, insomnia, Anxiety.   Her cancers were diagnosed in 2018 - they were separate primary     Hypertension - blood pressure in office today 120/70 - currently taking Losartan 100 mg -HCTZ 25 mg, Amlodipine 5 and Metoprolol 50 mg BID.     Hypothyroidism - taking Synthroid 125 mcg daily. She had quite elevated TSH in April of this year and her Synthroid was adjusted at that time.    In September, she got an infection in her nail. She has been on antibiotics for greater than 50 days. She ended up having to have an amputation of the left index finger. She had significant diarrhea for a few days and now she is having constipation. She has tried colace without benefit. She is taking a probiotic and has started eating align yogurt over the last few days.     The following portions of the patient's history were reviewed and updated as appropriate: allergies, current medications, past family history, past medical history, past social history, past surgical history and problem list.    Objective     Objective     Allergy:   Allergies   Allergen Reactions    Oxycodone Itching    Ace Inhibitors Cough    Codeine Itching        Current Medications:   Current Outpatient Medications   Medication Sig Dispense Refill    acetaminophen (TYLENOL) 325 MG tablet Take 2 tablets by mouth.      albuterol sulfate  (90 Base) MCG/ACT inhaler Inhale 2 puffs Every 4 (Four) Hours As Needed for  Wheezing. 8 g 0    amLODIPine (NORVASC) 5 MG tablet Take 1 tablet by mouth Daily. 90 tablet 0    aspirin 81 MG EC tablet Take 1 tablet by mouth Daily.      Azelastine HCl 137 MCG/SPRAY solution Administer 2 sprays into the nostril(s) as directed by provider Daily As Needed (nasal congestion). 30 mL 1    brompheniramine-pseudoephedrine-DM 30-2-10 MG/5ML syrup Take 5 mL by mouth 4 (Four) Times a Day As Needed for Allergies. 118 mL 0    calcium carbonate (OS-SAAD) 600 MG tablet Take 1 tablet by mouth Daily.      ezetimibe (ZETIA) 10 MG tablet Take 1 tablet by mouth Daily. 90 tablet 0    levothyroxine (SYNTHROID, LEVOTHROID) 125 MCG tablet Take 1 tablet by mouth Every Morning. 90 tablet 0    losartan-hydrochlorothiazide (HYZAAR) 100-25 MG per tablet Take 1 tablet by mouth Daily. 90 tablet 0    meloxicam (MOBIC) 15 MG tablet Take 1 tablet by mouth Daily. 90 tablet 0    metoprolol tartrate (LOPRESSOR) 50 MG tablet Take 1 tablet by mouth 2 (Two) Times a Day. 180 tablet 0    multivitamin with minerals tablet tablet Take 1 tablet by mouth Daily.      omeprazole (priLOSEC) 40 MG capsule Take 1 capsule by mouth Daily. 90 capsule 2    ondansetron (ZOFRAN) 4 MG tablet Take 2 tablets by mouth.      Probiotic Product (PROBIOTIC DAILY PO) Take  by mouth.      rosuvastatin (CRESTOR) 20 MG tablet Take 1 tablet by mouth Every Night. 90 tablet 0    venlafaxine XR (EFFEXOR-XR) 150 MG 24 hr capsule Take 1 capsule by mouth Daily. 90 capsule 0    Vitamin D, Cholecalciferol, (CHOLECALCIFEROL) 10 MCG (400 UNIT) tablet Take 2 tablets by mouth Daily.      hydrOXYzine pamoate (Vistaril) 25 MG capsule Take 1 capsule by mouth At Night As Needed for Anxiety. 90 capsule 0     No current facility-administered medications for this visit.       Past Medical History:  Past Medical History:   Diagnosis Date    Adenocarcinoma of lung, stage 1, left 2018    LLL    Asthma     I have shortness of breath when climbing stairs, or walking up a hill or women  getting over excited about something    CTS (carpal tunnel syndrome) 2018    DDD (degenerative disc disease), cervical     Depression     Diverticulosis     Ductal carcinoma in situ (DCIS) of right breast     LUCILLE (generalized anxiety disorder)     GERD (gastroesophageal reflux disease)     Heart murmur 2022    HLD (hyperlipidemia)     HTN (hypertension)     Hypothyroidism     Mixed hyperlipidemia     Non-toxic uninodular goiter     Nontoxic uninodular goiter     Osteoarthritis of hands, bilateral     Pneumonia 1965    Sleep apnea 2015    Severe - 44/hour now have a CPAP since     Stroke 2022    Brain MRI results indicate    TIA (transient ischemic attack) 2022    Vitamin D deficiency        Past Surgical History:  Past Surgical History:   Procedure Laterality Date    BREAST LUMPECTOMY Left 1998    BREAST LUMPECTOMY Right 2018    BUNIONECTOMY      COLONOSCOPY  2014    EYE PTOSIS REPAIR Bilateral 2017    EYE SURGERY Right 10/2021    HYSTERECTOMY  2000    JOINT REPLACEMENT  2015    left knee    KNEE ARTHROSCOPY Left 2014    LUNG LOBECTOMY Left 2018    LLL    REPLACEMENT TOTAL KNEE Left 2015    THORACOTOMY Left 2018    TONSILLECTOMY  1961    TUBAL ABDOMINAL LIGATION         Social History:  Social History     Socioeconomic History    Marital status:      Spouse name: Brint    Number of children: 2    Years of education: College   Tobacco Use    Smoking status: Former     Current packs/day: 0.00     Average packs/day: 2.0 packs/day for 10.3 years (20.7 ttl pk-yrs)     Types: Cigarettes     Start date: 1978     Quit date: 1988     Years since quittin.5     Passive exposure: Past    Smokeless tobacco: Never    Tobacco comments:     Mostly social smoker. one pack on weekend, may 1/2 pack during week   Vaping Use    Vaping status: Never Used   Substance and Sexual Activity    Alcohol use: Yes     Alcohol/week: 17.0 standard drinks of alcohol     Types: 15 Glasses of wine, 2 Drinks  "containing 0.5 oz of alcohol per week     Comment: occas    Drug use: No    Sexual activity: Not Currently     Partners: Male     Birth control/protection: Post-menopausal       Family History:  Family History   Problem Relation Age of Onset    Asthma Mother     Ovarian cancer Mother     Hypothyroidism Mother     Cancer Mother         Ovarian    Heart failure Father     Prostate cancer Father     Heart attack Father     Arthritis Father     Cancer Father         Prostate    Hearing loss Father     Heart disease Father          of  maker    Breast cancer Sister     Migraines Sister         in her teens and 20's    Arthritis Sister     Cancer Sister         Breast         Vital Signs:   /74   Pulse 60   Temp 98.4 °F (36.9 °C) (Temporal)   Resp 20   Ht 160 cm (62.99\")   Wt 70.5 kg (155 lb 6.4 oz)   SpO2 97%   BMI 27.53 kg/m²      Physical Exam:  Physical Exam  Vitals reviewed.   Constitutional:       Appearance: She is not ill-appearing.   Eyes:      Pupils: Pupils are equal, round, and reactive to light.   Cardiovascular:      Rate and Rhythm: Normal rate.      Pulses: Normal pulses.   Pulmonary:      Effort: Pulmonary effort is normal.      Breath sounds: Normal breath sounds.   Neurological:      General: No focal deficit present.      Mental Status: She is alert. Mental status is at baseline.   Psychiatric:         Mood and Affect: Mood normal.         Behavior: Behavior normal.         Thought Content: Thought content normal.         Judgment: Judgment normal.               PHQ-9 Score  PHQ-9 Total Score:      Lab Review  Office Visit on 2024   Component Date Value Ref Range Status    Color 2024 Yellow  Yellow, Straw, Dark Yellow, Joselyn Final    Clarity, UA 2024 Clear  Clear Final    Specific Gravity  2024 1.015  1.005 - 1.030 Final    pH, Urine 2024 6.0  5.0 - 8.0 Final    Leukocytes 2024 Negative  Negative Final    Nitrite, UA 2024 Negative  " Negative Final    Protein, POC 11/12/2024 Trace (A)  Negative mg/dL Final    Glucose, UA 11/12/2024 Negative  Negative mg/dL Final    Ketones, UA 11/12/2024 Negative  Negative Final    Urobilinogen, UA 11/12/2024 0.2 E.U./dL  Normal, 0.2 E.U./dL Final    Bilirubin 11/12/2024 Small (1+) (A)  Negative Final    Blood, UA 11/12/2024 Negative  Negative Final    Lot Number 11/12/2024 [id number]   Final    Expiration Date 11/12/2024 04/11/2026   Final   Admission on 10/06/2024, Discharged on 10/06/2024   Component Date Value Ref Range Status    SARS Antigen 10/06/2024 Not Detected  Not Detected, Presumptive Negative Final    Influenza A Antigen SARAH 10/06/2024 Not Detected  Not Detected Final    Influenza B Antigen SARAH 10/06/2024 Not Detected  Not Detected Final    Internal Control 10/06/2024 Passed  Passed Final    Lot Number 10/06/2024 [id number]   Final    Expiration Date 10/06/2024 09/04/2025   Final    Rapid Strep A Screen 10/06/2024 Negative   Final    Internal Control 10/06/2024 Passed   Final    Lot Number 10/06/2024 [id number]   Final    Expiration Date 10/06/2024 01/01/2027   Final        Radiology Results  XR Hand 3+ View Left    Result Date: 10/15/2024  Impression: Interval amputation of the second digit to the middle phalanx mid diaphysis. Associated postsurgical change. Advanced degenerative changes throughout the hand and wrist. CRITICAL RESULT:   No. COMMUNICATION: Per this written report. Drafted by Patric Escalona on 10/15/2024 8:24 AM Final report signed by Patric Escalona on 10/15/2024 8:26 AM      Assessment / Plan         Assessment and Plan   Diagnoses and all orders for this visit:    1. Primary hypertension (Primary)  Assessment & Plan:  Hypertension is stable and controlled  Continue current treatment regimen.  Regular aerobic exercise.  Ambulatory blood pressure monitoring.  Blood pressure will be reassessed in 3 months.    Orders:  -     losartan-hydrochlorothiazide (HYZAAR) 100-25 MG per tablet;  Take 1 tablet by mouth Daily.  Dispense: 90 tablet; Refill: 0  -     amLODIPine (NORVASC) 5 MG tablet; Take 1 tablet by mouth Daily.  Dispense: 90 tablet; Refill: 0  -     metoprolol tartrate (LOPRESSOR) 50 MG tablet; Take 1 tablet by mouth 2 (Two) Times a Day.  Dispense: 180 tablet; Refill: 0  -     rosuvastatin (CRESTOR) 20 MG tablet; Take 1 tablet by mouth Every Night.  Dispense: 90 tablet; Refill: 0  -     Comprehensive Metabolic Panel; Future  -     CBC & Differential; Future    2. Immunization due  -     Fluzone High-Dose 65+yrs  -     Pneumococcal Conjugate Vaccine 20-Valent All    3. Back pain, unspecified back location, unspecified back pain laterality, unspecified chronicity  -     POC Urinalysis Dipstick, Automated    4. Mixed hyperlipidemia  Assessment & Plan:   Lipid abnormalities are stable    Plan:  Continue same medication/s without change.      Discussed medication dosage, use, side effects, and goals of treatment in detail.    Counseled patient on lifestyle modifications to help control hyperlipidemia.     Patient Treatment Goals:   LDL goal is under 100    Followup in 6 months.    Orders:  -     ezetimibe (ZETIA) 10 MG tablet; Take 1 tablet by mouth Daily.  Dispense: 90 tablet; Refill: 0    5. Acquired hypothyroidism  Assessment & Plan:  Thyroid studies ordered with labs    Orders:  -     levothyroxine (SYNTHROID, LEVOTHROID) 125 MCG tablet; Take 1 tablet by mouth Every Morning.  Dispense: 90 tablet; Refill: 0  -     TSH; Future  -     T4, free; Future    6. LUCILLE (generalized anxiety disorder)  -     venlafaxine XR (EFFEXOR-XR) 150 MG 24 hr capsule; Take 1 capsule by mouth Daily.  Dispense: 90 capsule; Refill: 0  -     hydrOXYzine pamoate (Vistaril) 25 MG capsule; Take 1 capsule by mouth At Night As Needed for Anxiety.  Dispense: 90 capsule; Refill: 0    7. Nasal sinus congestion  -     Azelastine HCl 137 MCG/SPRAY solution; Administer 2 sprays into the nostril(s) as directed by provider Daily As  Needed (nasal congestion).  Dispense: 30 mL; Refill: 1    8. Primary osteoarthritis of both hands  -     meloxicam (MOBIC) 15 MG tablet; Take 1 tablet by mouth Daily.  Dispense: 90 tablet; Refill: 0    9. Constipation, unspecified constipation type  Assessment & Plan:  - Continue colace and probiotics   - Start Miralax ok to take BID until she has bowel movements, then daily.           Discussed possible differential diagnoses, testing, treatment, recommended non-pharmacological interventions, risks, warning signs to monitor for that would indicate need for follow-up in clinic or ER. If no improvement with these regimens or you have new or worsening symptoms follow-up. Patient verbalizes understanding and agreement with plan of care. Denies further needs or concerns.     Patient was given instructions and counseling regarding her condition and for health maintenance advised.        Health Maintenance  Health Maintenance:   Health Maintenance Due   Topic Date Due    DXA SCAN  11/08/2023    ANNUAL WELLNESS VISIT  05/02/2024        Meds ordered during this visit  New Medications Ordered This Visit   Medications    losartan-hydrochlorothiazide (HYZAAR) 100-25 MG per tablet     Sig: Take 1 tablet by mouth Daily.     Dispense:  90 tablet     Refill:  0    amLODIPine (NORVASC) 5 MG tablet     Sig: Take 1 tablet by mouth Daily.     Dispense:  90 tablet     Refill:  0    metoprolol tartrate (LOPRESSOR) 50 MG tablet     Sig: Take 1 tablet by mouth 2 (Two) Times a Day.     Dispense:  180 tablet     Refill:  0    levothyroxine (SYNTHROID, LEVOTHROID) 125 MCG tablet     Sig: Take 1 tablet by mouth Every Morning.     Dispense:  90 tablet     Refill:  0    Azelastine HCl 137 MCG/SPRAY solution     Sig: Administer 2 sprays into the nostril(s) as directed by provider Daily As Needed (nasal congestion).     Dispense:  30 mL     Refill:  1    ezetimibe (ZETIA) 10 MG tablet     Sig: Take 1 tablet by mouth Daily.     Dispense:  90  tablet     Refill:  0    meloxicam (MOBIC) 15 MG tablet     Sig: Take 1 tablet by mouth Daily.     Dispense:  90 tablet     Refill:  0    rosuvastatin (CRESTOR) 20 MG tablet     Sig: Take 1 tablet by mouth Every Night.     Dispense:  90 tablet     Refill:  0    venlafaxine XR (EFFEXOR-XR) 150 MG 24 hr capsule     Sig: Take 1 capsule by mouth Daily.     Dispense:  90 capsule     Refill:  0    hydrOXYzine pamoate (Vistaril) 25 MG capsule     Sig: Take 1 capsule by mouth At Night As Needed for Anxiety.     Dispense:  90 capsule     Refill:  0       Meds stopped during this visit:  Medications Discontinued During This Encounter   Medication Reason    B Complex Vitamins (VITAMIN B COMPLEX PO) *Therapy completed    azithromycin (Zithromax Z-Rick) 250 MG tablet *Therapy completed    nystatin (MYCOSTATIN) 100,000 unit/mL suspension *Therapy completed    ibuprofen (ADVIL,MOTRIN) 200 MG tablet     rosuvastatin (CRESTOR) 20 MG tablet Reorder    venlafaxine XR (EFFEXOR-XR) 150 MG 24 hr capsule Reorder    Azelastine HCl 137 MCG/SPRAY solution Reorder    ezetimibe (ZETIA) 10 MG tablet Reorder    losartan-hydrochlorothiazide (HYZAAR) 100-25 MG per tablet Reorder    meloxicam (MOBIC) 7.5 MG tablet Reorder    levothyroxine (SYNTHROID, LEVOTHROID) 125 MCG tablet Reorder    metoprolol tartrate (LOPRESSOR) 50 MG tablet Reorder    amLODIPine (NORVASC) 5 MG tablet Reorder        Visit Diagnoses    ICD-10-CM ICD-9-CM   1. Primary hypertension  I10 401.9   2. Immunization due  Z23 V05.9   3. Back pain, unspecified back location, unspecified back pain laterality, unspecified chronicity  M54.9 724.5   4. Mixed hyperlipidemia  E78.2 272.2   5. Acquired hypothyroidism  E03.9 244.9   6. LUCILLE (generalized anxiety disorder)  F41.1 300.02   7. Nasal sinus congestion  R09.81 478.19   8. Primary osteoarthritis of both hands  M19.041 715.14    M19.042    9. Constipation, unspecified constipation type  K59.00 564.00       Patient was given instructions  and counseling regarding her condition or for health maintenance advice. Please see specific information pulled into the AVS if appropriate.     Follow Up   Return in about 3 months (around 2/12/2025) for Medicare Wellness.          This document has been electronically signed by Rhiannon Mccormick DO   November 12, 2024 12:33 EST    Dictated Utilizing Dragon Dictation: Part of this note may be an electronic transcription/translation of spoken language to printed text using the Dragon Dictation System.    Rhiannon Mccormick D.O.  [facility]

## 2024-11-12 NOTE — ASSESSMENT & PLAN NOTE
- Continue colace and probiotics   - Start Miralax ok to take BID until she has bowel movements, then daily.

## 2024-11-12 NOTE — LETTER
[facility]  Vaccine Consent Form    Patient Name:  Linn Bean  Patient :  1955     Vaccine(s) Ordered    Fluzone High-Dose 65+yrs  Pneumococcal Conjugate Vaccine 20-Valent All        Screening Checklist  The following questions should be completed prior to vaccination. If you answer “yes” to any question, it does not necessarily mean you should not be vaccinated. It just means we may need to clarify or ask more questions. If a question is unclear, please ask your healthcare provider to explain it.    Yes No   Any fever or moderate to severe illness today (mild illness and/or antibiotic treatment are not contraindications)?     Do you have a history of a serious reaction to any previous vaccinations, such as anaphylaxis, encephalopathy within 7 days, Guillain-Lutts syndrome within 6 weeks, seizure?     Have you received any live vaccine(s) (e.g MMR, REYNALDO) or any other vaccines in the last month (to ensure duplicate doses aren't given)?     Do you have an anaphylactic allergy to latex (DTaP, DTaP-IPV, Hep A, Hep B, MenB, RV, Td, Tdap), baker’s yeast (Hep B, HPV), polysorbates (RSV, nirsevimab, PCV 20, Rotavirrus, Tdap, Shingrix), or gelatin (REYNALDO, MMR)?     Do you have an anaphylactic allergy to neomycin (Rabies, REYNALDO, MMR, IPV, Hep A), polymyxin B (IPV), or streptomycin (IPV)?      Any cancer, leukemia, AIDS, or other immune system disorder? (REYNALDO, MMR, RV)     Do you have a parent, brother, or sister with an immune system problem (if immune competence of vaccine recipient clinically verified, can proceed)? (MMR, REYNALDO)     Any recent steroid treatments for >2 weeks, chemotherapy, or radiation treatment? (REYNALDO, MMR)     Have you received antibody-containing blood transfusions or IVIG in the past 11 months (recommended interval is dependent on product)? (MMR, REYNALDO)     Have you taken antiviral drugs (acyclovir, famciclovir, valacyclovir for REYNALDO) in the last 24 or 48 hours, respectively?      Are you  "pregnant or planning to become pregnant within 1 month? (REYNALDO, MMR, HPV, IPV, MenB, Abrexvy; For Hep B- refer to Engerix-B; For RSV - Abrysvo is indicated for 32-36 weeks of pregnancy from September to January)     For infants, have you ever been told your child has had intussusception or a medical emergency involving obstruction of the intestine (Rotavirus)? If not for an infant, can skip this question.         *Ordering Physicians/APC should be consulted if \"yes\" is checked by the patient or guardian above.  I have received, read, and understand the Vaccine Information Statement (VIS) for each vaccine ordered.  I have considered my or my child's health status as well as the health status of my close contacts.  I have taken the opportunity to discuss my vaccine questions with my or my child's health care provider.   I have requested that the ordered vaccine(s) be given to me or my child.  I understand the benefits and risks of the vaccines.  I understand that I should remain in the clinic for 15 minutes after receiving the vaccine(s).  _________________________________________________________  Signature of Patient or Parent/Legal Guardian ____________________  Date     "

## 2024-11-12 NOTE — ASSESSMENT & PLAN NOTE
Hypertension is stable and controlled  Continue current treatment regimen.  Regular aerobic exercise.  Ambulatory blood pressure monitoring.  Blood pressure will be reassessed in 3 months.

## 2024-11-13 DIAGNOSIS — E03.9 ACQUIRED HYPOTHYROIDISM: Primary | ICD-10-CM

## 2024-11-13 RX ORDER — LEVOTHYROXINE SODIUM 150 UG/1
150 TABLET ORAL DAILY
Qty: 90 TABLET | Refills: 0 | Status: SHIPPED | OUTPATIENT
Start: 2024-11-13

## 2024-11-21 ENCOUNTER — OFFICE VISIT (OUTPATIENT)
Dept: FAMILY MEDICINE CLINIC | Facility: CLINIC | Age: 69
End: 2024-11-21
Payer: MEDICARE

## 2024-11-21 VITALS
BODY MASS INDEX: 27.5 KG/M2 | HEIGHT: 63 IN | DIASTOLIC BLOOD PRESSURE: 70 MMHG | RESPIRATION RATE: 20 BRPM | HEART RATE: 72 BPM | WEIGHT: 155.2 LBS | TEMPERATURE: 97.8 F | SYSTOLIC BLOOD PRESSURE: 120 MMHG

## 2024-11-21 DIAGNOSIS — K59.00 CONSTIPATION, UNSPECIFIED CONSTIPATION TYPE: Primary | ICD-10-CM

## 2024-11-21 PROCEDURE — 3074F SYST BP LT 130 MM HG: CPT | Performed by: FAMILY MEDICINE

## 2024-11-21 PROCEDURE — 1160F RVW MEDS BY RX/DR IN RCRD: CPT | Performed by: FAMILY MEDICINE

## 2024-11-21 PROCEDURE — 1125F AMNT PAIN NOTED PAIN PRSNT: CPT | Performed by: FAMILY MEDICINE

## 2024-11-21 PROCEDURE — 3078F DIAST BP <80 MM HG: CPT | Performed by: FAMILY MEDICINE

## 2024-11-21 PROCEDURE — 99213 OFFICE O/P EST LOW 20 MIN: CPT | Performed by: FAMILY MEDICINE

## 2024-11-21 PROCEDURE — 1159F MED LIST DOCD IN RCRD: CPT | Performed by: FAMILY MEDICINE

## 2024-11-21 RX ORDER — LACTULOSE 10 G/15ML
20 SOLUTION ORAL 2 TIMES DAILY PRN
Qty: 237 ML | Refills: 0 | Status: SHIPPED | OUTPATIENT
Start: 2024-11-21

## 2024-11-21 NOTE — PROGRESS NOTES
Subjective     Chief Complaint  GI Problem (Bowel problems)    Subjective          Linn Bean is a 69 y.o. female who presents today to [facility] for follow up.    HPI:   GI Problem        Ms. Bean is a pleasant 69 year old female who presents today with constipation. This started after requiring multiple rounds of antibiotics. She has taken Probiotics, activa, fiber gummies, elizabeth lax, colace, magnesium. She is having some cramping in the lower abdomen and her back is painful, as well. She hasn't had blood in the stool. Prior to this she had intermittent constipation that was mild. She denies blood in what stool that she is able to pass.       The following portions of the patient's history were reviewed and updated as appropriate: allergies, current medications, past family history, past medical history, past social history, past surgical history and problem list.    Objective     Objective     Allergy:   Allergies   Allergen Reactions    Oxycodone Itching    Ace Inhibitors Cough    Codeine Itching        Current Medications:   Current Outpatient Medications   Medication Sig Dispense Refill    acetaminophen (TYLENOL) 325 MG tablet Take 2 tablets by mouth.      albuterol sulfate  (90 Base) MCG/ACT inhaler Inhale 2 puffs Every 4 (Four) Hours As Needed for Wheezing. 8 g 0    amLODIPine (NORVASC) 5 MG tablet Take 1 tablet by mouth Daily. 90 tablet 0    aspirin 81 MG EC tablet Take 1 tablet by mouth Daily.      Azelastine HCl 137 MCG/SPRAY solution Administer 2 sprays into the nostril(s) as directed by provider Daily As Needed (nasal congestion). 30 mL 1    brompheniramine-pseudoephedrine-DM 30-2-10 MG/5ML syrup Take 5 mL by mouth 4 (Four) Times a Day As Needed for Allergies. 118 mL 0    calcium carbonate (OS-SAAD) 600 MG tablet Take 1 tablet by mouth Daily.      ezetimibe (ZETIA) 10 MG tablet Take 1 tablet by mouth Daily. 90 tablet 0    hydrOXYzine pamoate  (Vistaril) 25 MG capsule Take 1 capsule by mouth At Night As Needed for Anxiety. 90 capsule 0    levothyroxine (Synthroid) 150 MCG tablet Take 1 tablet by mouth Daily. 90 tablet 0    losartan-hydrochlorothiazide (HYZAAR) 100-25 MG per tablet Take 1 tablet by mouth Daily. 90 tablet 0    meloxicam (MOBIC) 15 MG tablet Take 1 tablet by mouth Daily. 90 tablet 0    metoprolol tartrate (LOPRESSOR) 50 MG tablet Take 1 tablet by mouth 2 (Two) Times a Day. 180 tablet 0    multivitamin with minerals tablet tablet Take 1 tablet by mouth Daily.      omeprazole (priLOSEC) 40 MG capsule Take 1 capsule by mouth Daily. 90 capsule 2    ondansetron (ZOFRAN) 4 MG tablet Take 2 tablets by mouth.      Probiotic Product (PROBIOTIC DAILY PO) Take  by mouth.      rosuvastatin (CRESTOR) 20 MG tablet Take 1 tablet by mouth Every Night. 90 tablet 0    venlafaxine XR (EFFEXOR-XR) 150 MG 24 hr capsule Take 1 capsule by mouth Daily. 90 capsule 0    Vitamin D, Cholecalciferol, (CHOLECALCIFEROL) 10 MCG (400 UNIT) tablet Take 2 tablets by mouth Daily.      lactulose (CHRONULAC) 10 GM/15ML solution Take 30 mL by mouth 2 (Two) Times a Day As Needed (constopation). 237 mL 0     No current facility-administered medications for this visit.       Past Medical History:  Past Medical History:   Diagnosis Date    Adenocarcinoma of lung, stage 1, left 2018    LLL    Asthma     I have shortness of breath when climbing stairs, or walking up a hill or women getting over excited about something    CTS (carpal tunnel syndrome) 2018    DDD (degenerative disc disease), cervical     Depression     Diverticulosis     Ductal carcinoma in situ (DCIS) of right breast 2018    LUCILLE (generalized anxiety disorder)     GERD (gastroesophageal reflux disease)     Heart murmur 12/2022    HLD (hyperlipidemia)     HTN (hypertension)     Hypothyroidism     Mixed hyperlipidemia     Non-toxic uninodular goiter     Nontoxic uninodular goiter     Osteoarthritis of hands, bilateral      Pneumonia 1965    Sleep apnea 2015    Severe - 44/hour now have a CPAP since     Stroke 2022    Brain MRI results indicate    TIA (transient ischemic attack) 2022    Vitamin D deficiency        Past Surgical History:  Past Surgical History:   Procedure Laterality Date    BREAST LUMPECTOMY Left 1998    BREAST LUMPECTOMY Right 2018    BUNIONECTOMY      COLONOSCOPY  2014    EYE PTOSIS REPAIR Bilateral 2017    EYE SURGERY Right 10/2021    HYSTERECTOMY  2000    JOINT REPLACEMENT  2015    left knee    KNEE ARTHROSCOPY Left 2014    LUNG LOBECTOMY Left 2018    LLL    REPLACEMENT TOTAL KNEE Left 2015    THORACOTOMY Left 2018    TONSILLECTOMY  1961    TUBAL ABDOMINAL LIGATION         Social History:  Social History     Socioeconomic History    Marital status:      Spouse name: Brint    Number of children: 2    Years of education: College   Tobacco Use    Smoking status: Former     Current packs/day: 0.00     Average packs/day: 2.0 packs/day for 10.3 years (20.7 ttl pk-yrs)     Types: Cigarettes     Start date: 1978     Quit date: 1988     Years since quittin.5     Passive exposure: Past    Smokeless tobacco: Never    Tobacco comments:     Mostly social smoker. one pack on weekend, may 1/2 pack during week   Vaping Use    Vaping status: Never Used   Substance and Sexual Activity    Alcohol use: Yes     Alcohol/week: 17.0 standard drinks of alcohol     Types: 15 Glasses of wine, 2 Drinks containing 0.5 oz of alcohol per week     Comment: occas    Drug use: No    Sexual activity: Not Currently     Partners: Male     Birth control/protection: Post-menopausal       Family History:  Family History   Problem Relation Age of Onset    Asthma Mother     Ovarian cancer Mother     Hypothyroidism Mother     Cancer Mother         Ovarian    Heart failure Father     Prostate cancer Father     Heart attack Father     Arthritis Father     Cancer Father         Prostate    Hearing loss Father     Heart disease  "Father          of  maker    Breast cancer Sister     Migraines Sister         in her teens and 20's    Arthritis Sister     Cancer Sister         Breast       Vital Signs:   /70   Pulse 72   Temp 97.8 °F (36.6 °C) (Temporal)   Resp 20   Ht 160 cm (62.99\")   Wt 70.4 kg (155 lb 3.2 oz)   BMI 27.50 kg/m²      Physical Exam:  Physical Exam  Vitals reviewed.   Constitutional:       Appearance: Normal appearance. She is normal weight.   Cardiovascular:      Rate and Rhythm: Normal rate and regular rhythm.   Pulmonary:      Effort: Pulmonary effort is normal.   Abdominal:      General: Bowel sounds are normal. There is no distension.      Palpations: There is no mass.      Tenderness: There is abdominal tenderness. There is no guarding or rebound.   Neurological:      Mental Status: She is alert.               PHQ-9 Score  PHQ-9 Total Score:      Lab Review  Lab on 2024   Component Date Value Ref Range Status    Glucose 2024 104 (H)  65 - 99 mg/dL Final    BUN 2024 13  8 - 23 mg/dL Final    Creatinine 2024 0.63  0.57 - 1.00 mg/dL Final    Sodium 2024 144  136 - 145 mmol/L Final    Potassium 2024 4.0  3.5 - 5.2 mmol/L Final    Chloride 2024 106  98 - 107 mmol/L Final    CO2 2024 26.6  22.0 - 29.0 mmol/L Final    Calcium 2024 9.7  8.6 - 10.5 mg/dL Final    Total Protein 2024 6.9  6.0 - 8.5 g/dL Final    Albumin 2024 4.4  3.5 - 5.2 g/dL Final    ALT (SGPT) 2024 24  1 - 33 U/L Final    AST (SGOT) 2024 24  1 - 32 U/L Final    Alkaline Phosphatase 2024 70  39 - 117 U/L Final    Total Bilirubin 2024 0.3  0.0 - 1.2 mg/dL Final    Globulin 2024 2.5  gm/dL Final    A/G Ratio 2024 1.8  g/dL Final    BUN/Creatinine Ratio 2024 20.6  7.0 - 25.0 Final    Anion Gap 2024 11.4  5.0 - 15.0 mmol/L Final    eGFR 2024 97.4  >60.0 mL/min/1.73 Final    TSH 2024 10.200 (H)  0.270 - 4.200 uIU/mL Final "    Free T4 11/12/2024 1.00  0.92 - 1.68 ng/dL Final    WBC 11/12/2024 7.03  3.40 - 10.80 10*3/mm3 Final    RBC 11/12/2024 4.22  3.77 - 5.28 10*6/mm3 Final    Hemoglobin 11/12/2024 13.0  12.0 - 15.9 g/dL Final    Hematocrit 11/12/2024 40.1  34.0 - 46.6 % Final    MCV 11/12/2024 95.0  79.0 - 97.0 fL Final    MCH 11/12/2024 30.8  26.6 - 33.0 pg Final    MCHC 11/12/2024 32.4  31.5 - 35.7 g/dL Final    RDW 11/12/2024 12.3  12.3 - 15.4 % Final    RDW-SD 11/12/2024 41.9  37.0 - 54.0 fl Final    MPV 11/12/2024 10.3  6.0 - 12.0 fL Final    Platelets 11/12/2024 283  140 - 450 10*3/mm3 Final    Neutrophil % 11/12/2024 54.5  42.7 - 76.0 % Final    Lymphocyte % 11/12/2024 23.0  19.6 - 45.3 % Final    Monocyte % 11/12/2024 14.4 (H)  5.0 - 12.0 % Final    Eosinophil % 11/12/2024 6.4 (H)  0.3 - 6.2 % Final    Basophil % 11/12/2024 1.3  0.0 - 1.5 % Final    Immature Grans % 11/12/2024 0.4  0.0 - 0.5 % Final    Neutrophils, Absolute 11/12/2024 3.83  1.70 - 7.00 10*3/mm3 Final    Lymphocytes, Absolute 11/12/2024 1.62  0.70 - 3.10 10*3/mm3 Final    Monocytes, Absolute 11/12/2024 1.01 (H)  0.10 - 0.90 10*3/mm3 Final    Eosinophils, Absolute 11/12/2024 0.45 (H)  0.00 - 0.40 10*3/mm3 Final    Basophils, Absolute 11/12/2024 0.09  0.00 - 0.20 10*3/mm3 Final    Immature Grans, Absolute 11/12/2024 0.03  0.00 - 0.05 10*3/mm3 Final    nRBC 11/12/2024 0.0  0.0 - 0.2 /100 WBC Final   Office Visit on 11/12/2024   Component Date Value Ref Range Status    Color 11/12/2024 Yellow  Yellow, Straw, Dark Yellow, Joselyn Final    Clarity, UA 11/12/2024 Clear  Clear Final    Specific Gravity  11/12/2024 1.015  1.005 - 1.030 Final    pH, Urine 11/12/2024 6.0  5.0 - 8.0 Final    Leukocytes 11/12/2024 Negative  Negative Final    Nitrite, UA 11/12/2024 Negative  Negative Final    Protein, POC 11/12/2024 Trace (A)  Negative mg/dL Final    Glucose, UA 11/12/2024 Negative  Negative mg/dL Final    Ketones, UA 11/12/2024 Negative  Negative Final    Urobilinogen, UA  11/12/2024 0.2 E.U./dL  Normal, 0.2 E.U./dL Final    Bilirubin 11/12/2024 Small (1+) (A)  Negative Final    Blood, UA 11/12/2024 Negative  Negative Final    Lot Number 11/12/2024 [id number]   Final    Expiration Date 11/12/2024 04/11/2026   Final   Admission on 10/06/2024, Discharged on 10/06/2024   Component Date Value Ref Range Status    SARS Antigen 10/06/2024 Not Detected  Not Detected, Presumptive Negative Final    Influenza A Antigen SARAH 10/06/2024 Not Detected  Not Detected Final    Influenza B Antigen SARAH 10/06/2024 Not Detected  Not Detected Final    Internal Control 10/06/2024 Passed  Passed Final    Lot Number 10/06/2024 [id number]   Final    Expiration Date 10/06/2024 09/04/2025   Final    Rapid Strep A Screen 10/06/2024 Negative   Final    Internal Control 10/06/2024 Passed   Final    Lot Number 10/06/2024 [id number]   Final    Expiration Date 10/06/2024 01/01/2027   Final        Radiology Results  XR Finger 2+ View Left    Result Date: 11/20/2024  Impression: Expected postoperative changes of amputation of the distal index finger at the level of the middle phalanx. CRITICAL RESULT:   No. COMMUNICATION: Per this written report. Drafted by Mg Braun MD on 11/20/2024 12:18 PM Final report signed by Mg Braun MD on 11/20/2024 12:20 PM      Assessment / Plan         Assessment and Plan   Diagnoses and all orders for this visit:    1. Constipation, unspecified constipation type (Primary)  -     lactulose (CHRONULAC) 10 GM/15ML solution; Take 30 mL by mouth 2 (Two) Times a Day As Needed (constopation).  Dispense: 237 mL; Refill: 0  -     XR Abdomen KUB (In Office)    - Starting Lactulose to stimulate bowel movements   - Continue probiotics and fiber etc.   - KUB ordered.   - for any acute abdominal pain, pt advised to go to the ER     Discussed possible differential diagnoses, testing, treatment, recommended non-pharmacological interventions, risks, warning signs to monitor for that would  indicate need for follow-up in clinic or ER. If no improvement with these regimens or you have new or worsening symptoms follow-up. Patient verbalizes understanding and agreement with plan of care. Denies further needs or concerns.     Patient was given instructions and counseling regarding her condition and for health maintenance advised.    Health Maintenance  Health Maintenance:   Health Maintenance Due   Topic Date Due    DXA SCAN  11/08/2023    ANNUAL WELLNESS VISIT  05/02/2024        Meds ordered during this visit  New Medications Ordered This Visit   Medications    lactulose (CHRONULAC) 10 GM/15ML solution     Sig: Take 30 mL by mouth 2 (Two) Times a Day As Needed (constopation).     Dispense:  237 mL     Refill:  0       Meds stopped during this visit:  There are no discontinued medications.     Visit Diagnoses    ICD-10-CM ICD-9-CM   1. Constipation, unspecified constipation type  K59.00 564.00       Patient was given instructions and counseling regarding her condition or for health maintenance advice. Please see specific information pulled into the AVS if appropriate.     Follow Up   Return for Next scheduled follow up.      This document has been electronically signed by Rhiannon Mccormick DO   November 21, 2024 08:22 EST    Dictated Utilizing Dragon Dictation: Part of this note may be an electronic transcription/translation of spoken language to printed text using the Dragon Dictation System.    Rhiannon Mccormick D.O.  [facility] [place] [facility] [place]

## 2024-11-30 DIAGNOSIS — E03.9 ACQUIRED HYPOTHYROIDISM: ICD-10-CM

## 2024-11-30 RX ORDER — LEVOTHYROXINE SODIUM 125 UG/1
125 TABLET ORAL
Qty: 90 TABLET | OUTPATIENT
Start: 2024-11-30

## 2024-12-03 ENCOUNTER — OFFICE VISIT (OUTPATIENT)
Dept: FAMILY MEDICINE CLINIC | Facility: CLINIC | Age: 69
End: 2024-12-03
Payer: MEDICARE

## 2024-12-03 VITALS
SYSTOLIC BLOOD PRESSURE: 110 MMHG | DIASTOLIC BLOOD PRESSURE: 60 MMHG | TEMPERATURE: 98.6 F | BODY MASS INDEX: 27.78 KG/M2 | HEART RATE: 82 BPM | OXYGEN SATURATION: 97 % | RESPIRATION RATE: 20 BRPM | WEIGHT: 156.8 LBS | HEIGHT: 63 IN

## 2024-12-03 DIAGNOSIS — J40 BRONCHITIS: Primary | ICD-10-CM

## 2024-12-03 DIAGNOSIS — J02.9 SORE THROAT: ICD-10-CM

## 2024-12-03 DIAGNOSIS — R05.9 COUGH, UNSPECIFIED TYPE: ICD-10-CM

## 2024-12-03 LAB
EXPIRATION DATE: ABNORMAL
EXPIRATION DATE: NORMAL
EXPIRATION DATE: NORMAL
FLUAV AG NPH QL: NEGATIVE
FLUBV AG NPH QL: NEGATIVE
INTERNAL CONTROL: ABNORMAL
INTERNAL CONTROL: NORMAL
INTERNAL CONTROL: NORMAL
Lab: ABNORMAL
Lab: NORMAL
Lab: NORMAL
S PYO AG THROAT QL: NEGATIVE
SARS-COV-2 AG UPPER RESP QL IA.RAPID: NOT DETECTED

## 2024-12-03 PROCEDURE — 87880 STREP A ASSAY W/OPTIC: CPT | Performed by: FAMILY MEDICINE

## 2024-12-03 PROCEDURE — 99213 OFFICE O/P EST LOW 20 MIN: CPT | Performed by: FAMILY MEDICINE

## 2024-12-03 PROCEDURE — 3074F SYST BP LT 130 MM HG: CPT | Performed by: FAMILY MEDICINE

## 2024-12-03 PROCEDURE — 1126F AMNT PAIN NOTED NONE PRSNT: CPT | Performed by: FAMILY MEDICINE

## 2024-12-03 PROCEDURE — 87804 INFLUENZA ASSAY W/OPTIC: CPT | Performed by: FAMILY MEDICINE

## 2024-12-03 PROCEDURE — 87426 SARSCOV CORONAVIRUS AG IA: CPT | Performed by: FAMILY MEDICINE

## 2024-12-03 PROCEDURE — 3078F DIAST BP <80 MM HG: CPT | Performed by: FAMILY MEDICINE

## 2024-12-03 RX ORDER — BENZONATATE 100 MG/1
100 CAPSULE ORAL 3 TIMES DAILY PRN
Qty: 30 CAPSULE | Refills: 0 | Status: SHIPPED | OUTPATIENT
Start: 2024-12-03

## 2024-12-03 RX ORDER — DOXYCYCLINE 100 MG/1
100 TABLET ORAL 2 TIMES DAILY
Qty: 20 TABLET | Refills: 0 | Status: SHIPPED | OUTPATIENT
Start: 2024-12-03 | End: 2024-12-13

## 2024-12-03 RX ORDER — PREDNISONE 10 MG/1
TABLET ORAL
Qty: 1 EACH | Refills: 0 | Status: SHIPPED | OUTPATIENT
Start: 2024-12-03

## 2024-12-03 NOTE — PROGRESS NOTES
"Chief Complaint  Cough (Greenish mucus) and Headache    Subjective        Linn Bean presents to [facility]  Cough  This is a new problem. The current episode started in the past 7 days. The problem has been worse. The cough is Productive of green sputum. Associated symptoms include headaches, nasal congestion, postnasal drip, rhinorrhea and shortness of breath. Pertinent negatives include no chills, fever, hemoptysis or wheezing. She has tried rest and OTC cough suppressant for the symptoms. The treatment provided mild relief. Her past medical history is significant for bronchitis.   Headache  Some shortness of breath with ambulation     Objective   Vital Signs:  /60   Pulse 82   Temp 98.6 °F (37 °C) (Temporal)   Resp 20   Ht 160 cm (62.99\")   Wt 71.1 kg (156 lb 12.8 oz)   SpO2 97%   BMI 27.78 kg/m²   Estimated body mass index is 27.78 kg/m² as calculated from the following:    Height as of this encounter: 160 cm (62.99\").    Weight as of this encounter: 71.1 kg (156 lb 12.8 oz).      Review of Systems   Constitutional:  Negative for chills and fever.   HENT:  Positive for postnasal drip, rhinorrhea, sinus pressure and sneezing.    Respiratory:  Positive for cough and shortness of breath. Negative for hemoptysis and wheezing.    Neurological:  Positive for headaches.           Physical Exam  Vitals reviewed.   Constitutional:       Appearance: Normal appearance.   HENT:      Right Ear: Tympanic membrane normal.      Left Ear: Tympanic membrane normal.      Nose: Congestion and rhinorrhea present. Rhinorrhea is purulent.      Right Turbinates: Swollen.      Left Turbinates: Swollen.      Mouth/Throat:      Pharynx: Posterior oropharyngeal erythema and postnasal drip present.   Cardiovascular:      Rate and Rhythm: Normal rate.      Pulses: Normal pulses.   Pulmonary:      Effort: Pulmonary effort is normal.      Breath sounds: Normal breath sounds. "   Neurological:      Mental Status: She is alert.        Result Review :         Assessment and Plan   Diagnoses and all orders for this visit:    1. Bronchitis (Primary)  Assessment & Plan:  - Rx Prednisone taper due to airway irritation and edema in turbinates.   - rx Doxycyline of symptoms not improving over the next few days.     Discussed use of saline nasal rinses and medications as prescribed  Continue allergy medications, Tylenol/ibuprofen as needed.  If symptoms do not improve patient to return to clinic for reevaluation      Orders:  -     predniSONE (DELTASONE) 10 MG (48) dose pack; Take as directed  Dispense: 1 each; Refill: 0  -     doxycycline (ADOXA) 100 MG tablet; Take 1 tablet by mouth 2 (Two) Times a Day for 10 days.  Dispense: 20 tablet; Refill: 0  -     benzonatate (Tessalon Perles) 100 MG capsule; Take 1 capsule by mouth 3 (Three) Times a Day As Needed for Cough.  Dispense: 30 capsule; Refill: 0    2. Cough, unspecified type  -     POC Influenza A / B  -     POCT SARS-CoV-2 Antigen    3. Sore throat  -     POC Rapid Strep A             Follow Up   No follow-ups on file.  Patient was given instructions and counseling regarding her condition or for health maintenance advice. Please see specific information pulled into the AVS if appropriate.       This document has been electronically signed by Rhiannon Mccormick DO   December 3, 2024 14:28 EST    Dictated Utilizing Dragon Dictation: Part of this note may be an electronic transcription/translation of spoken language to printed text using the Dragon Dictation System.    Rhiannon Mccormick D.O.  [facility] [place] [facility] [place]

## 2024-12-03 NOTE — ASSESSMENT & PLAN NOTE
- Rx Prednisone taper due to airway irritation and edema in turbinates.   - rx Doxycyline of symptoms not improving over the next few days.     Discussed use of saline nasal rinses and medications as prescribed  Continue allergy medications, Tylenol/ibuprofen as needed.  If symptoms do not improve patient to return to clinic for reevaluation

## 2024-12-27 DIAGNOSIS — K21.9 GASTROESOPHAGEAL REFLUX DISEASE WITHOUT ESOPHAGITIS: ICD-10-CM

## 2024-12-27 RX ORDER — OMEPRAZOLE 40 MG/1
40 CAPSULE, DELAYED RELEASE ORAL DAILY
Qty: 90 CAPSULE | Refills: 2 | Status: SHIPPED | OUTPATIENT
Start: 2024-12-27

## 2025-01-02 DIAGNOSIS — I10 ESSENTIAL HYPERTENSION: Primary | ICD-10-CM

## 2025-01-03 ENCOUNTER — LAB (OUTPATIENT)
Dept: LAB | Facility: HOSPITAL | Age: 70
End: 2025-01-03
Payer: MEDICARE

## 2025-01-03 DIAGNOSIS — E03.9 ACQUIRED HYPOTHYROIDISM: ICD-10-CM

## 2025-01-03 DIAGNOSIS — I10 ESSENTIAL HYPERTENSION: ICD-10-CM

## 2025-01-03 LAB
ALBUMIN SERPL-MCNC: 4.2 G/DL (ref 3.5–5.2)
ALBUMIN/GLOB SERPL: 1.4 G/DL
ALP SERPL-CCNC: 67 U/L (ref 39–117)
ALT SERPL W P-5'-P-CCNC: 31 U/L (ref 1–33)
ANION GAP SERPL CALCULATED.3IONS-SCNC: 13.7 MMOL/L (ref 5–15)
AST SERPL-CCNC: 28 U/L (ref 1–32)
BILIRUB SERPL-MCNC: 0.7 MG/DL (ref 0–1.2)
BUN SERPL-MCNC: 21 MG/DL (ref 8–23)
BUN/CREAT SERPL: 27.3 (ref 7–25)
CALCIUM SPEC-SCNC: 9.9 MG/DL (ref 8.6–10.5)
CHLORIDE SERPL-SCNC: 100 MMOL/L (ref 98–107)
CO2 SERPL-SCNC: 26.3 MMOL/L (ref 22–29)
CREAT SERPL-MCNC: 0.77 MG/DL (ref 0.57–1)
EGFRCR SERPLBLD CKD-EPI 2021: 84.7 ML/MIN/1.73
GLOBULIN UR ELPH-MCNC: 3 GM/DL
GLUCOSE SERPL-MCNC: 89 MG/DL (ref 65–99)
POTASSIUM SERPL-SCNC: 4 MMOL/L (ref 3.5–5.2)
PROT SERPL-MCNC: 7.2 G/DL (ref 6–8.5)
SODIUM SERPL-SCNC: 140 MMOL/L (ref 136–145)
T3FREE SERPL-MCNC: 3.54 PG/ML (ref 2–4.4)
T4 FREE SERPL-MCNC: 2.12 NG/DL (ref 0.92–1.68)
TSH SERPL DL<=0.05 MIU/L-ACNC: 0.28 UIU/ML (ref 0.27–4.2)

## 2025-01-03 PROCEDURE — 84439 ASSAY OF FREE THYROXINE: CPT

## 2025-01-03 PROCEDURE — 80053 COMPREHEN METABOLIC PANEL: CPT

## 2025-01-03 PROCEDURE — 36415 COLL VENOUS BLD VENIPUNCTURE: CPT

## 2025-01-03 PROCEDURE — 84443 ASSAY THYROID STIM HORMONE: CPT

## 2025-01-03 PROCEDURE — 84481 FREE ASSAY (FT-3): CPT

## 2025-01-07 RX ORDER — LEVOTHYROXINE SODIUM 137 UG/1
137 TABLET ORAL DAILY
Qty: 90 TABLET | Refills: 1 | Status: SHIPPED | OUTPATIENT
Start: 2025-01-07

## 2025-01-30 DIAGNOSIS — M19.042 PRIMARY OSTEOARTHRITIS OF BOTH HANDS: ICD-10-CM

## 2025-01-30 DIAGNOSIS — M19.041 PRIMARY OSTEOARTHRITIS OF BOTH HANDS: ICD-10-CM

## 2025-01-30 RX ORDER — MELOXICAM 15 MG/1
15 TABLET ORAL DAILY
Qty: 90 TABLET | Refills: 0 | Status: SHIPPED | OUTPATIENT
Start: 2025-01-30

## 2025-02-14 DIAGNOSIS — I10 PRIMARY HYPERTENSION: ICD-10-CM

## 2025-02-14 RX ORDER — AMLODIPINE BESYLATE 5 MG/1
5 TABLET ORAL DAILY
Qty: 90 TABLET | Refills: 0 | Status: SHIPPED | OUTPATIENT
Start: 2025-02-14

## 2025-02-17 RX ORDER — LEVOTHYROXINE SODIUM 137 UG/1
137 TABLET ORAL DAILY
Qty: 90 TABLET | Refills: 1 | Status: SHIPPED | OUTPATIENT
Start: 2025-02-17

## 2025-03-08 DIAGNOSIS — I10 PRIMARY HYPERTENSION: ICD-10-CM

## 2025-03-08 RX ORDER — ROSUVASTATIN CALCIUM 20 MG/1
20 TABLET, COATED ORAL NIGHTLY
Qty: 90 TABLET | Refills: 0 | Status: SHIPPED | OUTPATIENT
Start: 2025-03-08

## 2025-03-11 ENCOUNTER — PATIENT ROUNDING (BHMG ONLY) (OUTPATIENT)
Dept: URGENT CARE | Facility: CLINIC | Age: 70
End: 2025-03-11
Payer: MEDICARE

## 2025-03-14 DIAGNOSIS — E78.2 MIXED HYPERLIPIDEMIA: ICD-10-CM

## 2025-03-14 RX ORDER — EZETIMIBE 10 MG/1
10 TABLET ORAL DAILY
Qty: 90 TABLET | Refills: 0 | Status: SHIPPED | OUTPATIENT
Start: 2025-03-14

## 2025-03-18 ENCOUNTER — OFFICE VISIT (OUTPATIENT)
Dept: GYNECOLOGIC ONCOLOGY | Facility: CLINIC | Age: 70
End: 2025-03-18
Payer: MEDICARE

## 2025-03-18 ENCOUNTER — OFFICE VISIT (OUTPATIENT)
Dept: FAMILY MEDICINE CLINIC | Facility: CLINIC | Age: 70
End: 2025-03-18
Payer: MEDICARE

## 2025-03-18 VITALS
HEIGHT: 62 IN | TEMPERATURE: 97.1 F | HEART RATE: 72 BPM | DIASTOLIC BLOOD PRESSURE: 69 MMHG | WEIGHT: 155.2 LBS | SYSTOLIC BLOOD PRESSURE: 126 MMHG | OXYGEN SATURATION: 98 % | BODY MASS INDEX: 28.56 KG/M2 | RESPIRATION RATE: 18 BRPM

## 2025-03-18 VITALS
TEMPERATURE: 98.4 F | BODY MASS INDEX: 28.71 KG/M2 | DIASTOLIC BLOOD PRESSURE: 70 MMHG | HEART RATE: 74 BPM | HEIGHT: 62 IN | WEIGHT: 156 LBS | SYSTOLIC BLOOD PRESSURE: 120 MMHG

## 2025-03-18 DIAGNOSIS — Z00.00 MEDICARE ANNUAL WELLNESS VISIT, SUBSEQUENT: Primary | ICD-10-CM

## 2025-03-18 DIAGNOSIS — Z12.11 SCREENING FOR COLON CANCER: ICD-10-CM

## 2025-03-18 DIAGNOSIS — R19.7 DIARRHEA, UNSPECIFIED TYPE: ICD-10-CM

## 2025-03-18 DIAGNOSIS — Z85.118 HISTORY OF LUNG CANCER: ICD-10-CM

## 2025-03-18 DIAGNOSIS — E78.2 MIXED HYPERLIPIDEMIA: ICD-10-CM

## 2025-03-18 DIAGNOSIS — Z01.419 WELL WOMAN EXAM WITH ROUTINE GYNECOLOGICAL EXAM: Primary | ICD-10-CM

## 2025-03-18 DIAGNOSIS — L74.9 SWEATING ABNORMALITY: ICD-10-CM

## 2025-03-18 DIAGNOSIS — E03.9 ACQUIRED HYPOTHYROIDISM: ICD-10-CM

## 2025-03-18 DIAGNOSIS — M89.9 DISORDER OF BONE, UNSPECIFIED: ICD-10-CM

## 2025-03-18 DIAGNOSIS — R79.9 ABNORMAL FINDING OF BLOOD CHEMISTRY, UNSPECIFIED: ICD-10-CM

## 2025-03-18 DIAGNOSIS — F41.1 GAD (GENERALIZED ANXIETY DISORDER): ICD-10-CM

## 2025-03-18 DIAGNOSIS — I10 PRIMARY HYPERTENSION: ICD-10-CM

## 2025-03-18 DIAGNOSIS — Z85.3 HISTORY OF BREAST CANCER: ICD-10-CM

## 2025-03-18 DIAGNOSIS — R19.4 BOWEL HABIT CHANGES: ICD-10-CM

## 2025-03-18 DIAGNOSIS — Z78.0 POSTMENOPAUSAL: ICD-10-CM

## 2025-03-18 PROCEDURE — G0101 CA SCREEN;PELVIC/BREAST EXAM: HCPCS | Performed by: NURSE PRACTITIONER

## 2025-03-18 PROCEDURE — 1126F AMNT PAIN NOTED NONE PRSNT: CPT | Performed by: NURSE PRACTITIONER

## 2025-03-18 PROCEDURE — 3074F SYST BP LT 130 MM HG: CPT | Performed by: NURSE PRACTITIONER

## 2025-03-18 PROCEDURE — 3078F DIAST BP <80 MM HG: CPT | Performed by: NURSE PRACTITIONER

## 2025-03-18 RX ORDER — METOPROLOL TARTRATE 50 MG
50 TABLET ORAL
COMMUNITY
Start: 2025-03-18 | End: 2025-03-18

## 2025-03-18 RX ORDER — EZETIMIBE 10 MG/1
10 TABLET ORAL DAILY
Qty: 90 TABLET | Refills: 0 | Status: SHIPPED | OUTPATIENT
Start: 2025-03-18

## 2025-03-18 RX ORDER — ROSUVASTATIN CALCIUM 20 MG/1
20 TABLET, COATED ORAL NIGHTLY
Qty: 90 TABLET | Refills: 0 | Status: SHIPPED | OUTPATIENT
Start: 2025-03-18

## 2025-03-18 RX ORDER — METOPROLOL TARTRATE 50 MG
50 TABLET ORAL 2 TIMES DAILY
Qty: 180 TABLET | Refills: 0 | Status: SHIPPED | OUTPATIENT
Start: 2025-03-18

## 2025-03-18 RX ORDER — LOSARTAN POTASSIUM AND HYDROCHLOROTHIAZIDE 25; 100 MG/1; MG/1
1 TABLET ORAL DAILY
Qty: 90 TABLET | Refills: 0 | Status: SHIPPED | OUTPATIENT
Start: 2025-03-18

## 2025-03-18 RX ORDER — AMLODIPINE BESYLATE 5 MG/1
5 TABLET ORAL DAILY
Qty: 90 TABLET | Refills: 0 | Status: SHIPPED | OUTPATIENT
Start: 2025-03-18

## 2025-03-18 NOTE — ASSESSMENT & PLAN NOTE
Annual wellness exam completed today. Health Maintenance including immunizations was updated and reflected in the chart. Yearly screening labs were ordered.     Further recommendations to be given once lab data received.     Health advice: healthy food choices with fresh fruits and vegetables, maintain sleep pattern at least 8 hours, avoid texting and distracted driving practices; wear safety belt, engage in regular exercise, maintain healthy weight, use safe sex practices, avoid alcohol and illicit drugs. Maintain immunizations that are up to date. Maintain health maintenance:Colon cancer screening, DEXA, Mammogram, PAP, etc.  Follow up with PCP if struggling with depression or anxiety. Keep regular dental and eye exams. Brush and floss teeth daily.     I suggest they take a daily multivitamin that is age-appropriate (example women's One-A-Day.)    Follow up Annually for Physical       Orders:    CBC & Differential; Future    Comprehensive Metabolic Panel; Future    Hemoglobin A1c; Future    Lipid Panel; Future    Vitamin B12; Future    Vitamin D,25-Hydroxy; Future

## 2025-03-18 NOTE — PROGRESS NOTES
GYN ONCOLOGY ANNUAL WELL WOMAN VISIT      Linn Bean  [id number]  1955      Subjective   Chief Complaint: Annual Exam       History of present illness:  Linn Bean is a 70 y.o. year old female who is here today for an annual exam. The patient has a history of fibroid uterus, s/p hysterectomy in 2000, as well as a family history of breast and ovarian cancers.  Linn underwent genetic testing in 10/2017 which showed no significant variants. She has a personal history of breast and lung cancer, both stage 1 separate primaries, diagnosed in 2018. She is s/p breast lumpectomy followed by radiation and lung lobectomy. She completed 5yrs of letrozole in 12/2023. Her oncologist at [facility] (Abi Cordero).  As it has now been over 5 years out since both cancers, she no longer has routine surveillance imaging which she admits does make her a bit apprehensive.  She does inquire about continuing breast MRI screenings in addition to annual mammograms.  Former smoker around college years, not much, ~1 pack/week.  She continues Effexor for management of hot flashes. States it is effectively managing symptoms.      She reports she is feeling very well today and has no complaints. Denies any recent or persistent breast pain, nipple discharge, erythema or dimpling of skin on breasts, palpable lumps/ bumps, or adenopathy. She denies vaginal bleeding, pelvic pain, changes in bowel or bladder function, new or concerning lesions, and breast problems.  See health maintenance below for update on well woman screenings.     Last colonoscopy was completed by Dr. Cullen at [facility] in 9/2020 which did show moderate diverticulosis in the sigmoid colon along with some thickening of the colon in this area as well.  Described this may be early chronic diverticulitis.  Internal hemorrhoids, grade 1 were also noted.  It was recommended that she repeat colonoscopy in 5 years given history of polyps and diverticulosis.  Today  "she tells me that she has been struggling with ongoing GI issues including bowel changes.  These are often correlated with certain dietary choices and her gallbladder has been worked up as potential causes.  She has an upcoming HIDA scan scheduled.  She has never met with a gastroenterologist and is interested in seeing someone at [facility].    She is scheduled to see her PCP later today for an annual physical.    Last fall she had her distal right index finger amputated due to uncontrolled infection.     Her daughter recently gave birth to her second grandchild, now age 6 months.  Please inquires about whether or not her daughter should also undergo genetic testing    Cancer History:   Oncology/Hematology History    No history exists.       Obstetric History:  OB History          2    Para   2    Term                AB        Living             SAB        IAB        Ectopic        Molar        Multiple        Live Births                   Menstrual History:     No LMP recorded (lmp unknown). Patient has had a hysterectomy.          The current medication list and allergy list were reviewed and reconciled.     Past Medical History, Past Surgical History, Social History, Family History have been reviewed and are without significant changes except as mentioned.    Health Maintenance:  see EMR. Last mammogram was 2024, birads2. Last colonoscopy was , with recommended follow-up in 5 year(s). Last DEXA was many years ago. Last pap smear was 2022, results were  normal PAP.. She  does not have a history of abnormal pap smears.        Review of Systems   Constitutional: Negative.    Gastrointestinal:  Positive for constipation and diarrhea.   Genitourinary: Negative.    Psychiatric/Behavioral: Negative.           Objective   Physical Exam  Vital Signs: /69   Pulse 72   Temp 97.1 °F (36.2 °C) (Temporal)   Resp 18   Ht 157.5 cm (62.01\")   Wt 70.4 kg (155 lb 3.2 oz)   LMP  (LMP Unknown)   " SpO2 98%   BMI 28.38 kg/m²   Vitals:    03/18/25 0941   PainSc: 0-No pain           General Appearance:  alert, cooperative, no apparent distress, appears stated age, and normal weight   Neurologic/Psych: A&O x 3, gait steady, appropriate affect   Lungs:   Clear to auscultation bilaterally; respirations regular, even, and unlabored bilaterally   Heart:  Regular rate and rhythm, no murmurs appreciated   Breasts:  Symmetrical, no masses, no lesions, no nipple discharge, and Right breast scarring consistent with h/o lumpectomy. Previous radiation marking site noted on right areola.    Abdomen:   Soft, non-tender, non-distended, and no organomegaly   Lymph nodes: No cervical, supraclavicular, inguinal or axillary adenopathy noted   Extremities: Normal, atraumatic; no clubbing, cyanosis, or edema    Skin: No rashes, ulcers, or suspicious lesions noted   Pelvic: External genitalia without lesions or skin changes.  Vagina is pink, moist, without lesions.  Vaginal cuff is smooth, intact, and without visible lesions. + Pap smear obtained. uterus surgically absent.  Ovaries surgically absent bilaterally and without palpable masses or fullness.      ECOG score: 0                    Assessment and Plan:      -Pap smear updated today.  If negative this will be her last 1.    -Encouraged to continue yearly mammograms, per medical oncologist.  Also encouraged her to discuss continuing breast MRI with Dr. Cordero.    -DEXA overdue.  She has completed 5 years of endocrine therapy.  Recommended that we update bone density screening which she is agreeable to.  Order from last August should still be good.  She will a referral coordinator know that we are ready to move forward with this at this time.    -Discussed bowel habit changes.  Possibly secondary to diverticulosis/diverticulitis.  Given cancer history, I do think it is worthwhile to follow this very closely.  She would like to be seen by a gastroenterologist at [facility].   Referral placed to Dr. Kirkland.    -It is my opinion that her daughter should complete a genetic consultation and testing.  She can do this by requesting her PCP place referral to oncology genetics at [facility].  Even if her testing is negative, she still may meet certain criteria that qualifies her as high risk, in which case screenings may be different than guideline for her particularly.  If so, she can then follow with our high risk nurse practitioner who can manage her cancer screenings.    Diagnoses and all orders for this visit:    1. Well woman exam with routine gynecological exam (Primary)  -     LIQUID-BASED PAP SMEAR WITH HPV GENOTYPING REGARDLESS OF INTERPRETATION (RENÉ,COR,MAD); Future  -     LIQUID-BASED PAP SMEAR WITH HPV GENOTYPING REGARDLESS OF INTERPRETATION (RENÉ,COR,MAD)    2. Screening for colon cancer  -     Ambulatory Referral For Screening Colonoscopy    3. Diarrhea, unspecified type  -     Ambulatory Referral to Gastroenterology    4. Bowel habit changes  -     Ambulatory Referral to Gastroenterology    5. History of lung cancer    6. History of breast cancer      Pain assessment was performed today as a part of patient’s care. For patients with pain related to surgery, gynecologic malignancy or cancer treatment, the plan is as noted in the assessment/plan.  For patients with pain not related to these issues, they are to seek any further needed care from a more appropriate provider, such as PCP.    Follow-up:   Return to clinic in 1 year for Annual exam.      Electronically signed by DAYAMI Wei on 03/18/2025

## 2025-03-18 NOTE — ASSESSMENT & PLAN NOTE
Orders:    metoprolol tartrate (LOPRESSOR) 50 MG tablet; Take 1 tablet by mouth 2 (Two) Times a Day.    amLODIPine (NORVASC) 5 MG tablet; Take 1 tablet by mouth Daily.    losartan-hydrochlorothiazide (HYZAAR) 100-25 MG per tablet; Take 1 tablet by mouth Daily.    rosuvastatin (CRESTOR) 20 MG tablet; Take 1 tablet by mouth Every Night.

## 2025-03-18 NOTE — PROGRESS NOTES
Subjective   The ABCs of the Annual Wellness Visit  Medicare Wellness Visit      Linn Bean is a 70 y.o. patient who presents for a Medicare Wellness Visit.    The following portions of the patient's history were reviewed and   updated as appropriate: allergies, current medications, past family history, past medical history, past social history, past surgical history, and problem list.    Compared to one year ago, the patient's physical   health is the same.  Compared to one year ago, the patient's mental   health is the same.    Recent Hospitalizations:  She was admitted within the past 365 days at [facility].     Current Medical Providers:  Patient Care Team:  Rhiannon Mccormick DO as PCP - General (Family Medicine)  Geoff eWston MD as Consulting Physician (Hand Surgery)  Vernon Bowman III, MD as Cardiologist (Cardiology)  Cary Crawford PA as Physician Assistant (Endocrinology)  Silvana Thurston APRN as Nurse Practitioner (Gynecologic Oncology)    Outpatient Medications Prior to Visit   Medication Sig Dispense Refill    acetaminophen (TYLENOL) 325 MG tablet Take 2 tablets by mouth.      albuterol sulfate  (90 Base) MCG/ACT inhaler Inhale 2 puffs Every 4 (Four) Hours As Needed for Wheezing. 8 g 0    aspirin 81 MG EC tablet Take 1 tablet by mouth Daily.      Azelastine HCl 137 MCG/SPRAY solution Administer 2 sprays into the nostril(s) as directed by provider Daily As Needed (nasal congestion). 30 mL 1    calcium carbonate (OS-SAAD) 600 MG tablet Take 1 tablet by mouth Daily.      hydrOXYzine pamoate (Vistaril) 25 MG capsule Take 1 capsule by mouth At Night As Needed for Anxiety. 90 capsule 0    levothyroxine (Synthroid) 137 MCG tablet Take 1 tablet by mouth Daily. 90 tablet 1    multivitamin with minerals tablet tablet Take 1 tablet by mouth Daily.      omeprazole (priLOSEC) 40 MG capsule TAKE 1 CAPSULE BY MOUTH DAILY 90 capsule 2    Probiotic Product (PROBIOTIC DAILY PO) Take  by  mouth.      venlafaxine XR (EFFEXOR-XR) 150 MG 24 hr capsule Take 1 capsule by mouth Daily. 90 capsule 0    amLODIPine (NORVASC) 5 MG tablet TAKE 1 TABLET BY MOUTH DAILY 90 tablet 0    ezetimibe (ZETIA) 10 MG tablet TAKE 1 TABLET BY MOUTH DAILY 90 tablet 0    losartan-hydrochlorothiazide (HYZAAR) 100-25 MG per tablet Take 1 tablet by mouth Daily. 90 tablet 0    meloxicam (MOBIC) 15 MG tablet TAKE 1 TABLET BY MOUTH DAILY 90 tablet 0    metoprolol tartrate (LOPRESSOR) 50 MG tablet Take 1 tablet by mouth 2 (Two) Times a Day. 180 tablet 0    rosuvastatin (CRESTOR) 20 MG tablet TAKE ONE TABLET BY MOUTH ONCE NIGHTLY 90 tablet 0    azithromycin (Zithromax Z-Rick) 250 MG tablet Take 2 tablets by mouth on day 1, then 1 tablet daily on days 2-5 (Patient not taking: Reported on 3/18/2025) 6 tablet 0    lactulose (CHRONULAC) 10 GM/15ML solution Take 30 mL by mouth 2 (Two) Times a Day As Needed (constopation). (Patient not taking: Reported on 3/18/2025) 237 mL 0    metoprolol tartrate (LOPRESSOR) 50 MG tablet Take 50 mg by mouth.      ondansetron (ZOFRAN) 4 MG tablet Take 2 tablets by mouth. (Patient not taking: Reported on 3/18/2025)      predniSONE (DELTASONE) 20 MG tablet Take 2 tablets by mouth Daily. (Patient not taking: Reported on 3/18/2025) 10 tablet 0    promethazine-dextromethorphan (PROMETHAZINE-DM) 6.25-15 MG/5ML syrup Take 5 mL by mouth 4 (Four) Times a Day As Needed for Cough. (Patient not taking: Reported on 3/18/2025) 118 mL 0    Vitamin D, Cholecalciferol, (CHOLECALCIFEROL) 10 MCG (400 UNIT) tablet Take 2 tablets by mouth Daily. (Patient not taking: Reported on 3/18/2025)       No facility-administered medications prior to visit.     No opioid medication identified on active medication list. I have reviewed chart for other potential  high risk medication/s and harmful drug interactions in the elderly.      Aspirin is on active medication list. Aspirin use is indicated based on review of current medical  "condition/s. Pros and cons of this therapy have been discussed today. Benefits of this medication outweigh potential harm.  Patient has been encouraged to continue taking this medication.  .      Patient Active Problem List   Diagnosis    Iliotibial band syndrome    Adenocarcinoma of lung, stage 1, left    DDD (degenerative disc disease), cervical    Ductal carcinoma in situ (DCIS) of right breast    LUCILLE (generalized anxiety disorder)    HLD (hyperlipidemia)    HTN (hypertension)    Hypothyroidism    Osteoarthritis of hands, bilateral    Heart murmur    Nontoxic uninodular goiter    History of lung cancer    History of breast cancer    Back pain    Brain ischemia    Hiatal hernia    History of fall    History of lobectomy of lung    History of lumpectomy    Loud second heart sound    Lung cancer    Nonrheumatic mitral valve regurgitation    Predisposition to allergic reaction    Primary malignant neoplasm of bronchus of left lower lobe    Wears glasses    DDD (degenerative disc disease), cervical    Intraductal carcinoma in situ of right breast    History of obstructive sleep apnea    Osteoarthritis of hands, bilateral    Uterine fibroid    Elevated coronary artery calcium score    Primary insomnia    Gastroesophageal reflux disease without esophagitis    Constipation    Bronchitis    Medicare annual wellness visit, subsequent     Advance Care Planning Advance Directive is not on file.  ACP discussion was held with the patient during this visit. Patient has an advance directive (not in EMR), copy requested.            Objective   Vitals:    03/18/25 1531   BP: 120/70   Pulse: 74   Temp: 98.4 °F (36.9 °C)   TempSrc: Infrared   Weight: 70.8 kg (156 lb)   Height: 157.5 cm (62.01\")   PainSc: 0-No pain       Estimated body mass index is 28.53 kg/m² as calculated from the following:    Height as of this encounter: 157.5 cm (62.01\").    Weight as of this encounter: 70.8 kg (156 lb).    BMI is >= 25 and <30. (Overweight) " The following options were offered after discussion;: weight loss educational material (shared in after visit summary)        Does the patient have evidence of cognitive impairment? No                                                                                                Health  Risk Assessment    Smoking Status:  Social History     Tobacco Use   Smoking Status Former    Current packs/day: 0.00    Average packs/day: 2.0 packs/day for 10.3 years (20.7 ttl pk-yrs)    Types: Cigarettes    Start date: 1978    Quit date: 1988    Years since quittin.9    Passive exposure: Past   Smokeless Tobacco Never   Tobacco Comments    Mostly social smoker. one pack on weekend, may 1/2 pack during week     Alcohol Consumption:  Social History     Substance and Sexual Activity   Alcohol Use Yes    Alcohol/week: 17.0 standard drinks of alcohol    Types: 15 Glasses of wine, 2 Drinks containing 0.5 oz of alcohol per week    Comment: occas       Fall Risk Screen  STEADI Fall Risk Assessment was completed, and patient is at LOW risk for falls.Assessment completed on:3/18/2025    Depression Screening   Little interest or pleasure in doing things? Not at all   Feeling down, depressed, or hopeless? Not at all   PHQ-2 Total Score 0      Health Habits and Functional and Cognitive Screening:      3/18/2025     3:34 PM   Functional & Cognitive Status   Do you have difficulty preparing food and eating? No   Do you have difficulty bathing yourself, getting dressed or grooming yourself? No   Do you have difficulty using the toilet? No   Do you have difficulty moving around from place to place? No   Do you have trouble with steps or getting out of a bed or a chair? No   Current Diet Well Balanced Diet   Dental Exam Up to date   Eye Exam Up to date   Exercise (times per week) 3 times per week   Current Exercises Include Aerobics   Do you need help using the phone?  No   Are you deaf or do you have serious difficulty hearing?  No    Do you need help to go to places out of walking distance? No   Do you need help shopping? No   Do you need help preparing meals?  No   Do you need help with housework?  No   Do you need help with laundry? No   Do you need help taking your medications? No   Do you need help managing money? No   Do you ever drive or ride in a car without wearing a seat belt? No   Have you felt unusual stress, anger or loneliness in the last month? No   Who do you live with? Spouse   If you need help, do you have trouble finding someone available to you? No   Have you been bothered in the last four weeks by sexual problems? No   Do you have difficulty concentrating, remembering or making decisions? No           Age-appropriate Screening Schedule:  Refer to the list below for future screening recommendations based on patient's age, sex and/or medical conditions. Orders for these recommended tests are listed in the plan section. The patient has been provided with a written plan.    Health Maintenance List  Health Maintenance   Topic Date Due    DXA SCAN  11/08/2023    BMI FOLLOWUP  02/05/2025    COLORECTAL CANCER SCREENING  09/10/2025    COVID-19 Vaccine (5 - 2024-25 season) 06/07/2025 (Originally 9/1/2024)    LIPID PANEL  04/22/2025    ANNUAL WELLNESS VISIT  03/18/2026    MAMMOGRAM  08/20/2026    PAP SMEAR  03/18/2028    TDAP/TD VACCINES (3 - Td or Tdap) 09/13/2034    HEPATITIS C SCREENING  Completed    INFLUENZA VACCINE  Completed    Pneumococcal Vaccine 50+  Completed    ZOSTER VACCINE  Completed    LUNG CANCER SCREENING  Discontinued                                                                                                                                                CMS Preventative Services Quick Reference  Risk Factors Identified During Encounter  Fall Risk-High or Moderate: Information on Fall Prevention Shared in After Visit Summary  Immunizations Discussed/Encouraged: Influenza  Dental Screening Recommended  Vision  "Screening Recommended    The above risks/problems have been discussed with the patient.  Pertinent information has been shared with the patient in the After Visit Summary.  An After Visit Summary and PPPS were made available to the patient.    Follow Up:   Next Medicare Wellness visit to be scheduled in 1 year.         Additional E&M Note during same encounter follows:  Patient has additional, significant, and separately identifiable condition(s)/problem(s) that require work above and beyond the Medicare Wellness Visit     Chief Complaint  Medicare Wellness-subsequent    Subjective   HPI  Linn is also being seen today for additional medical problem/s.                Objective   Vital Signs:  /70   Pulse 74   Temp 98.4 °F (36.9 °C) (Infrared)   Ht 157.5 cm (62.01\")   Wt 70.8 kg (156 lb)   BMI 28.53 kg/m²   Physical Exam  Vitals reviewed.   Constitutional:       Appearance: She is normal weight.   Cardiovascular:      Rate and Rhythm: Normal rate and regular rhythm.      Pulses: Normal pulses.      Heart sounds: No murmur heard.  Pulmonary:      Effort: Pulmonary effort is normal. No respiratory distress.      Breath sounds: No wheezing.   Abdominal:      General: Abdomen is flat. Bowel sounds are normal.   Musculoskeletal:         General: No swelling or tenderness.      Cervical back: Normal range of motion.   Neurological:      General: No focal deficit present.      Mental Status: She is alert. Mental status is at baseline.   Psychiatric:         Mood and Affect: Mood normal.         Behavior: Behavior normal.         Thought Content: Thought content normal.         Judgment: Judgment normal.            Assessment and Plan      Medicare annual wellness visit, subsequent  Annual wellness exam completed today. Health Maintenance including immunizations was updated and reflected in the chart. Yearly screening labs were ordered.     Further recommendations to be given once lab data received.     Health " advice: healthy food choices with fresh fruits and vegetables, maintain sleep pattern at least 8 hours, avoid texting and distracted driving practices; wear safety belt, engage in regular exercise, maintain healthy weight, use safe sex practices, avoid alcohol and illicit drugs. Maintain immunizations that are up to date. Maintain health maintenance:Colon cancer screening, DEXA, Mammogram, PAP, etc.  Follow up with PCP if struggling with depression or anxiety. Keep regular dental and eye exams. Brush and floss teeth daily.     I suggest they take a daily multivitamin that is age-appropriate (example women's One-A-Day.)    Follow up Annually for Physical       Orders:    CBC & Differential; Future    Comprehensive Metabolic Panel; Future    Hemoglobin A1c; Future    Lipid Panel; Future    Vitamin B12; Future    Vitamin D,25-Hydroxy; Future    Acquired hypothyroidism    Orders:    TSH; Future    T4, free; Future    T3, free; Future    Primary hypertension      Orders:    metoprolol tartrate (LOPRESSOR) 50 MG tablet; Take 1 tablet by mouth 2 (Two) Times a Day.    amLODIPine (NORVASC) 5 MG tablet; Take 1 tablet by mouth Daily.    losartan-hydrochlorothiazide (HYZAAR) 100-25 MG per tablet; Take 1 tablet by mouth Daily.    rosuvastatin (CRESTOR) 20 MG tablet; Take 1 tablet by mouth Every Night.    Mixed hyperlipidemia       Orders:    ezetimibe (ZETIA) 10 MG tablet; Take 1 tablet by mouth Daily.    LUCILLE (generalized anxiety disorder)      Orders:    TSH; Future    Sweating abnormality    Orders:    Cortisol - AM; Future    Luteinizing hormone; Future    Ferritin; Future    DHEA-sulfate; Future    Follicle stimulating hormone; Future    Prolactin; Future    Estradiol; Future    Disorder of bone, unspecified    Orders:    Vitamin D,25-Hydroxy; Future    Postmenopausal    Orders:    Luteinizing hormone; Future    Ferritin; Future    DHEA-sulfate; Future    Follicle stimulating hormone; Future    Prolactin; Future     Estradiol; Future    Abnormal finding of blood chemistry, unspecified    Orders:    Ferritin; Future            Follow Up   No follow-ups on file.  Patient was given instructions and counseling regarding her condition or for health maintenance advice. Please see specific information pulled into the AVS if appropriate.      This document has been electronically signed by Rhiannon Mccormick DO   March 18, 2025 16:31 EDT    Dictated Utilizing Dragon Dictation: Part of this note may be an electronic transcription/translation of spoken language to printed text using the Dragon Dictation System.    Rhiannon Mccormick D.O.  [facility]

## 2025-03-19 LAB — REF LAB TEST METHOD: NORMAL

## 2025-03-27 ENCOUNTER — LAB (OUTPATIENT)
Dept: LAB | Facility: HOSPITAL | Age: 70
End: 2025-03-27
Payer: MEDICARE

## 2025-03-27 DIAGNOSIS — E03.9 ACQUIRED HYPOTHYROIDISM: ICD-10-CM

## 2025-03-27 DIAGNOSIS — L74.9 SWEATING ABNORMALITY: ICD-10-CM

## 2025-03-27 DIAGNOSIS — F41.1 GAD (GENERALIZED ANXIETY DISORDER): ICD-10-CM

## 2025-03-27 DIAGNOSIS — R79.9 ABNORMAL FINDING OF BLOOD CHEMISTRY, UNSPECIFIED: ICD-10-CM

## 2025-03-27 DIAGNOSIS — Z78.0 POSTMENOPAUSAL: ICD-10-CM

## 2025-03-27 DIAGNOSIS — M89.9 DISORDER OF BONE, UNSPECIFIED: ICD-10-CM

## 2025-03-27 DIAGNOSIS — Z00.00 MEDICARE ANNUAL WELLNESS VISIT, SUBSEQUENT: ICD-10-CM

## 2025-03-27 LAB
25(OH)D3 SERPL-MCNC: 49.6 NG/ML (ref 30–100)
ALBUMIN SERPL-MCNC: 4.3 G/DL (ref 3.5–5.2)
ALBUMIN/GLOB SERPL: 1.7 G/DL
ALP SERPL-CCNC: 67 U/L (ref 39–117)
ALT SERPL W P-5'-P-CCNC: 30 U/L (ref 1–33)
ANION GAP SERPL CALCULATED.3IONS-SCNC: 16.8 MMOL/L (ref 5–15)
AST SERPL-CCNC: 30 U/L (ref 1–32)
BASOPHILS # BLD AUTO: 0.07 10*3/MM3 (ref 0–0.2)
BASOPHILS NFR BLD AUTO: 1.3 % (ref 0–1.5)
BILIRUB SERPL-MCNC: 0.4 MG/DL (ref 0–1.2)
BUN SERPL-MCNC: 17 MG/DL (ref 8–23)
BUN/CREAT SERPL: 28.3 (ref 7–25)
CALCIUM SPEC-SCNC: 9.6 MG/DL (ref 8.6–10.5)
CHLORIDE SERPL-SCNC: 103 MMOL/L (ref 98–107)
CHOLEST SERPL-MCNC: 176 MG/DL (ref 0–200)
CO2 SERPL-SCNC: 22.2 MMOL/L (ref 22–29)
CORTIS AM PEAK SERPL-MCNC: 13.64 MCG/DL
CREAT SERPL-MCNC: 0.6 MG/DL (ref 0.57–1)
DEPRECATED RDW RBC AUTO: 42.7 FL (ref 37–54)
EGFRCR SERPLBLD CKD-EPI 2021: 97.9 ML/MIN/1.73
EOSINOPHIL # BLD AUTO: 0.37 10*3/MM3 (ref 0–0.4)
EOSINOPHIL NFR BLD AUTO: 7 % (ref 0.3–6.2)
ERYTHROCYTE [DISTWIDTH] IN BLOOD BY AUTOMATED COUNT: 12.5 % (ref 12.3–15.4)
ESTRADIOL SERPL HS-MCNC: <5 PG/ML
FERRITIN SERPL-MCNC: 198 NG/ML (ref 13–150)
FSH SERPL-ACNC: 50.9 MIU/ML
GLOBULIN UR ELPH-MCNC: 2.5 GM/DL
GLUCOSE SERPL-MCNC: 104 MG/DL (ref 65–99)
HBA1C MFR BLD: 5.9 % (ref 4.8–5.6)
HCT VFR BLD AUTO: 43.4 % (ref 34–46.6)
HDLC SERPL-MCNC: 42 MG/DL (ref 40–60)
HGB BLD-MCNC: 14.2 G/DL (ref 12–15.9)
IMM GRANULOCYTES # BLD AUTO: 0.02 10*3/MM3 (ref 0–0.05)
IMM GRANULOCYTES NFR BLD AUTO: 0.4 % (ref 0–0.5)
LDLC SERPL CALC-MCNC: 79 MG/DL (ref 0–100)
LDLC/HDLC SERPL: 1.57 {RATIO}
LH SERPL-ACNC: 23.7 MIU/ML
LYMPHOCYTES # BLD AUTO: 1.74 10*3/MM3 (ref 0.7–3.1)
LYMPHOCYTES NFR BLD AUTO: 32.9 % (ref 19.6–45.3)
MCH RBC QN AUTO: 30.7 PG (ref 26.6–33)
MCHC RBC AUTO-ENTMCNC: 32.7 G/DL (ref 31.5–35.7)
MCV RBC AUTO: 93.7 FL (ref 79–97)
MONOCYTES # BLD AUTO: 0.76 10*3/MM3 (ref 0.1–0.9)
MONOCYTES NFR BLD AUTO: 14.4 % (ref 5–12)
NEUTROPHILS NFR BLD AUTO: 2.33 10*3/MM3 (ref 1.7–7)
NEUTROPHILS NFR BLD AUTO: 44 % (ref 42.7–76)
NRBC BLD AUTO-RTO: 0 /100 WBC (ref 0–0.2)
PLATELET # BLD AUTO: 255 10*3/MM3 (ref 140–450)
PMV BLD AUTO: 10.8 FL (ref 6–12)
POTASSIUM SERPL-SCNC: 3.8 MMOL/L (ref 3.5–5.2)
PROLACTIN SERPL-MCNC: 28.6 NG/ML (ref 4.79–23.3)
PROT SERPL-MCNC: 6.8 G/DL (ref 6–8.5)
RBC # BLD AUTO: 4.63 10*6/MM3 (ref 3.77–5.28)
SODIUM SERPL-SCNC: 142 MMOL/L (ref 136–145)
T3FREE SERPL-MCNC: 4.36 PG/ML (ref 2–4.4)
T4 FREE SERPL-MCNC: 1.51 NG/DL (ref 0.92–1.68)
TRIGL SERPL-MCNC: 340 MG/DL (ref 0–150)
TSH SERPL DL<=0.05 MIU/L-ACNC: 0.19 UIU/ML (ref 0.27–4.2)
VIT B12 BLD-MCNC: 417 PG/ML (ref 211–946)
VLDLC SERPL-MCNC: 55 MG/DL (ref 5–40)
WBC NRBC COR # BLD AUTO: 5.29 10*3/MM3 (ref 3.4–10.8)

## 2025-03-27 PROCEDURE — 82670 ASSAY OF TOTAL ESTRADIOL: CPT

## 2025-03-27 PROCEDURE — 84443 ASSAY THYROID STIM HORMONE: CPT

## 2025-03-27 PROCEDURE — 83001 ASSAY OF GONADOTROPIN (FSH): CPT

## 2025-03-27 PROCEDURE — 84481 FREE ASSAY (FT-3): CPT

## 2025-03-27 PROCEDURE — 82728 ASSAY OF FERRITIN: CPT

## 2025-03-27 PROCEDURE — 84439 ASSAY OF FREE THYROXINE: CPT

## 2025-03-27 PROCEDURE — 85025 COMPLETE CBC W/AUTO DIFF WBC: CPT

## 2025-03-27 PROCEDURE — 80061 LIPID PANEL: CPT

## 2025-03-27 PROCEDURE — 83036 HEMOGLOBIN GLYCOSYLATED A1C: CPT

## 2025-03-27 PROCEDURE — 82306 VITAMIN D 25 HYDROXY: CPT

## 2025-03-27 PROCEDURE — 83002 ASSAY OF GONADOTROPIN (LH): CPT

## 2025-03-27 PROCEDURE — 82533 TOTAL CORTISOL: CPT

## 2025-03-27 PROCEDURE — 82607 VITAMIN B-12: CPT

## 2025-03-27 PROCEDURE — 80053 COMPREHEN METABOLIC PANEL: CPT

## 2025-03-27 PROCEDURE — 36415 COLL VENOUS BLD VENIPUNCTURE: CPT

## 2025-03-27 PROCEDURE — 84146 ASSAY OF PROLACTIN: CPT

## 2025-03-27 PROCEDURE — 82627 DEHYDROEPIANDROSTERONE: CPT

## 2025-03-28 LAB — DHEA-S SERPL-MCNC: 43.7 UG/DL (ref 20.4–186.6)

## 2025-04-23 ENCOUNTER — OFFICE VISIT (OUTPATIENT)
Dept: CARDIOLOGY | Facility: CLINIC | Age: 70
End: 2025-04-23
Payer: MEDICARE

## 2025-04-23 VITALS
DIASTOLIC BLOOD PRESSURE: 72 MMHG | HEART RATE: 67 BPM | OXYGEN SATURATION: 97 % | HEIGHT: 62 IN | BODY MASS INDEX: 27.97 KG/M2 | WEIGHT: 152 LBS | SYSTOLIC BLOOD PRESSURE: 126 MMHG

## 2025-04-23 DIAGNOSIS — I10 PRIMARY HYPERTENSION: ICD-10-CM

## 2025-04-23 DIAGNOSIS — E78.2 MIXED HYPERLIPIDEMIA: Primary | ICD-10-CM

## 2025-04-23 PROCEDURE — 99214 OFFICE O/P EST MOD 30 MIN: CPT | Performed by: INTERNAL MEDICINE

## 2025-04-23 NOTE — PROGRESS NOTES
[facility] at [facility]  Office visit  Linn Bean  1955  [phone]  There is no work phone number on file.    VISIT DATE:  4/23/2025    PCP: Rhiannon Mccormick DO  [address] [id number]    CC:  Chief Complaint   Patient presents with    Mixed hyperlipidemia       Previous cardiac studies and procedures:  November 2020 transthoracic echocardiogram  Left ventricular ejection fraction appears to be 61 - 65%. Left ventricular systolic function is normal.  Left ventricular diastolic function is consistent with (grade Ia w/high LAP) impaired relaxation.    November 2023 TTE  Visually estimated ejection fraction 50% +/- 5%. Abnormal systolic strain   pattern. Abnormal diastolic function with normal LV filling pressures   (Grade 1 diastolic dysfunction).   No significant valvular heart disease. Aortic sclerosis  Severely abnormal left atrial volume index 49 ml/m2.     January 2023  Coronary calcium score:  Coronary artery calcium score of 503 which places the   patient in the 90 percentile rank for gender and age.     Carotid duplex: Less than 20% bilaterally.    February 2023 myocardial perfusion imaging  1. Exercise myocardial perfusion imaging suggesting a low risk of   significant   obstructive coronary artery disease.   2. Normal LV ejection fraction.   3. Brief wide-complex rhythm early in exercise.  This might have been a   brief   run of SVT versus transient aberrant conduction.     August 2023 ambulatory ECG monitor: Unremarkable    ASSESSMENT:   Diagnosis Plan   1. Mixed hyperlipidemia        2. Primary hypertension                PLAN:  Hypertension, essential: Goal less than 130/80 mmHg.  Currently with reasonable control.  Continue current medical therapy.     Hyperlipidemia: Well-controlled.  Continue current medical therapy.  Goal LDL less than 70.    Coronary calcification: Nonobstructive coronary atherosclerosis.  Continue aggressive risk factor  modification.  Currently asymptomatic.  Heart healthy, predominant plant-based diet such as a Mediterranean diet.    Tachypalpitations: No high risk clinical features.  Previous unremarkable ambulatory ECG monitors.  Continue beta-blockade.    Subjective  Normal assessment: Stable functional capacity.  Denies chest pain, dyspnea exertion.  Blood pressures running less than 130/80 mmHg.  Palpitations well-controlled.    Initial evaluation: 65-year-old female with history of hypertension, dyslipidemia, remote history of smoking presenting with episodes of shortness of breath with exertion and recent worsening in blood pressure control.  She has had high blood pressure for approximately 2 years, onset shortly after being treated for lung nodule and breast cancer requiring lumpectomy and radiation therapy.  Follow-up imaging has been stable and negative for recurrent disease.  She denies chest discomfort.  Does report shortness of breath with such activities as going up a flight of stairs.  Intermittent brief palpitations associated with anxiety.  Developed cough on ACE inhibitor.  Compliant with current medical therapy.  Feels like her blood pressure has begun to improved after initiation of metoprolol about 4 weeks ago.  Blood pressures are predominantly running less than 130/85 mmHg.  She will have intermittent spikes in her systolic blood pressure up to 160 mmHg with diastolic blood pressures as high as 99 mmHg.  Her lowest recorded blood pressure over the previous 4 weeks was 85/66 mmHg.  She is not exercising on a regular basis.  Was diagnosed with mild sleep apnea 2 to 3 years ago, she did not tolerate CPAP but is using a dental appliance.  Her  does report that she snores prominently.  Only 1 caffeinated beverage per day, less than 2 glasses of wine per day.    PHYSICAL EXAMINATION:  Vitals:    04/23/25 0943   BP: 126/72   BP Location: Right arm   Patient Position: Sitting   Cuff Size: Adult   Pulse: 67  "  SpO2: 97%   Weight: 68.9 kg (152 lb)   Height: 157.5 cm (62.01\")         General Appearance:    Alert, cooperative, no distress, appears stated age   Head:    Normocephalic, without obvious abnormality, atraumatic   Eyes:    conjunctiva/corneas clear   Nose:   Nares normal, septum midline, mucosa normal, no drainage   Throat:   Lips, teeth and gums normal   Neck:   Supple, symmetrical, trachea midline, no carotid    bruit or JVD   Lungs:     Clear to auscultation bilaterally, respirations unlabored   Chest Wall:    No tenderness or deformity    Heart:    Regular rate and rhythm, S1 and S2 normal, 2/6 early peaking systolic murmur right upper sternal border, rub   or gallop, normal carotid impulse bilaterally without bruit.   Abdomen:     Soft, non-tender   Extremities:   Extremities normal, atraumatic, no cyanosis or edema   Pulses:   2+ and symmetric all extremities   Skin:   Skin color, texture, turgor normal, no rashes or lesions       Diagnostic Data:  Procedures  Lab Results   Component Value Date    TRIG 340 (H) 03/27/2025    HDL 42 03/27/2025     Lab Results   Component Value Date    GLUCOSE 104 (H) 03/27/2025    BUN 17 03/27/2025    CREATININE 0.60 03/27/2025     03/27/2025    K 3.8 03/27/2025     03/27/2025    CO2 22.2 03/27/2025     Lab Results   Component Value Date    HGBA1C 5.90 (H) 03/27/2025     Lab Results   Component Value Date    WBC 5.29 03/27/2025    HGB 14.2 03/27/2025    HCT 43.4 03/27/2025     03/27/2025       Allergies  Allergies   Allergen Reactions    Oxycodone Itching    Ace Inhibitors Cough    Codeine Itching       Current Medications    Current Outpatient Medications:     acetaminophen (TYLENOL) 325 MG tablet, Take 2 tablets by mouth., Disp: , Rfl:     albuterol sulfate  (90 Base) MCG/ACT inhaler, Inhale 2 puffs Every 4 (Four) Hours As Needed for Wheezing., Disp: 8 g, Rfl: 0    amLODIPine (NORVASC) 5 MG tablet, Take 1 tablet by mouth Daily., Disp: 90 tablet, " Rfl: 0    aspirin 81 MG EC tablet, Take 1 tablet by mouth Daily., Disp: , Rfl:     Azelastine HCl 137 MCG/SPRAY solution, Administer 2 sprays into the nostril(s) as directed by provider Daily As Needed (nasal congestion)., Disp: 30 mL, Rfl: 1    calcium carbonate (OS-SAAD) 600 MG tablet, Take 1 tablet by mouth Daily., Disp: , Rfl:     diclofenac (VOLTAREN) 50 MG EC tablet, Take 1 tablet by mouth 2 (Two) Times a Day., Disp: 180 tablet, Rfl: 1    ezetimibe (ZETIA) 10 MG tablet, Take 1 tablet by mouth Daily., Disp: 90 tablet, Rfl: 0    hydrOXYzine pamoate (Vistaril) 25 MG capsule, Take 1 capsule by mouth At Night As Needed for Anxiety., Disp: 90 capsule, Rfl: 0    levothyroxine (Synthroid) 137 MCG tablet, Take 1 tablet by mouth Daily., Disp: 90 tablet, Rfl: 1    losartan-hydrochlorothiazide (HYZAAR) 100-25 MG per tablet, Take 1 tablet by mouth Daily., Disp: 90 tablet, Rfl: 0    metoprolol tartrate (LOPRESSOR) 50 MG tablet, Take 1 tablet by mouth 2 (Two) Times a Day., Disp: 180 tablet, Rfl: 0    multivitamin with minerals tablet tablet, Take 1 tablet by mouth Daily., Disp: , Rfl:     omeprazole (priLOSEC) 40 MG capsule, TAKE 1 CAPSULE BY MOUTH DAILY, Disp: 90 capsule, Rfl: 2    Probiotic Product (PROBIOTIC DAILY PO), Take  by mouth., Disp: , Rfl:     rosuvastatin (CRESTOR) 20 MG tablet, Take 1 tablet by mouth Every Night., Disp: 90 tablet, Rfl: 0    TURMERIC PO, , Disp: , Rfl:     venlafaxine XR (EFFEXOR-XR) 150 MG 24 hr capsule, Take 1 capsule by mouth Daily., Disp: 90 capsule, Rfl: 0          ROS  ROS      SOCIAL HX  Social History     Socioeconomic History    Marital status:      Spouse name: Brint    Number of children: 2    Years of education: College   Tobacco Use    Smoking status: Former     Current packs/day: 0.00     Average packs/day: 2.0 packs/day for 10.3 years (20.7 ttl pk-yrs)     Types: Cigarettes     Start date: 1978     Quit date: 1988     Years since quittin.0     Passive exposure:  Past    Smokeless tobacco: Never    Tobacco comments:     Mostly social smoker. one pack on weekend, may 1/2 pack during week   Vaping Use    Vaping status: Never Used   Substance and Sexual Activity    Alcohol use: Yes     Alcohol/week: 17.0 standard drinks of alcohol     Types: 15 Glasses of wine, 2 Drinks containing 0.5 oz of alcohol per week     Comment: occas    Drug use: No    Sexual activity: Not Currently     Partners: Male     Birth control/protection: Post-menopausal       FAMILY HX  Family History   Problem Relation Age of Onset    Asthma Mother     Ovarian cancer Mother         [id number] stg 4    Hypothyroidism Mother     Cancer Mother         Ovarian    Heart failure Father     Prostate cancer Father         aage 73    Heart attack Father          of a  maker,     Arthritis Father     Cancer Father         Prostate    Hearing loss Father     Heart disease Father          of  maker    Arrhythmia Father     Breast cancer Sister     Migraines Sister         in her teens and 20's    Arthritis Sister     Cancer Sister         Breast    Breast cancer Sister         age 54    Diabetes Maternal Grandmother         Developed late in life    Breast cancer Paternal Aunt         age 55    Cancer Paternal Aunt         Breast    Breast cancer Paternal Aunt         age 75    Cancer Paternal Aunt         Breast    Cancer Maternal Uncle         Colon cancer    Colon cancer Maternal Uncle     Cancer Paternal Aunt         Breat cancer    Cancer Paternal Aunt         Breast cancer             Vernon Bowman III, MD, FACC

## 2025-04-30 NOTE — PROGRESS NOTES
Chief complaint/Reason for consult: Hypothyroidism and hyperprolactinemia    Consult requested by Rhiannon Mccormick DO    HPI: Ms. Bean is a 70-year-old female with hypothyroidism, hyperlipidemia, hypertension, CLAY[place], lung cancer and DCIS who comes for consultation of hypothyroidism and hyperprolactinemia.    - Patient denies nipple discharge  - Patient denies new or changing headaches  - Patient denies vision changes  - Not taking any antipsychotic medications  - Takes venlafaxine XR  - Takes a PPI  - No recent vigorous activity    Labs reviewed  3/27/2025 at 8:22 AM  Cortisol 13.64  DHEA-S 43.7  LH 23.70, FSH 50.90, estradiol <5.0  TSH 0.190, FT4 1.51, FT3 4.36  Prolactin 28.60  Creatinine 0.60    # Hypothyroidism  - Diagnosed 1988 after pregnancy    - Is currently on levothyroxine 137 mcg daily  - Reports good compliance with levothyroxine and takes it at the same time every day with all of her other medications including calcium and a PPI, not with hot beverages  - Denies taking biotin supplement  - Reports a lot of fatigue, muscle/joint aches and poor sleep   - Most recent TSH 0.190 and FT4 1.51, done 3/27/2025 while taking Levothyroxine  137mcg daily     Past medical history, past surgical history, family history and social history reviewed within this encounter.     Review of Systems   Constitutional:  Positive for fatigue.   HENT:  Negative for trouble swallowing and voice change.    Eyes:  Negative for visual disturbance.   Respiratory:  Negative for shortness of breath.    Cardiovascular:  Negative for chest pain.   Gastrointestinal:  Negative for abdominal pain.   Endocrine: Positive for heat intolerance. Negative for cold intolerance.   Musculoskeletal:  Positive for arthralgias and myalgias.   Skin:  Negative for rash.   Psychiatric/Behavioral:  Positive for sleep disturbance. Negative for agitation.         /68 (BP Location: Right arm, Patient Position: Sitting, Cuff Size: Adult)   Pulse 74   Ht  "160 cm (63\")   Wt 68.9 kg (152 lb)   LMP  (LMP Unknown)   SpO2 98%   BMI 26.93 kg/m²      Physical Exam  Vitals reviewed.   Constitutional:       General: She is not in acute distress.  HENT:      Head: Normocephalic.      Nose: Nose normal.   Eyes:      Conjunctiva/sclera: Conjunctivae normal.   Cardiovascular:      Rate and Rhythm: Normal rate.      Pulses: Normal pulses.   Pulmonary:      Effort: Pulmonary effort is normal.   Abdominal:      Tenderness: There is no guarding.   Musculoskeletal:         General: Deformity (surgically absent distal left index finger) present.      Cervical back: Neck supple.   Skin:     General: Skin is warm and dry.   Neurological:      Mental Status: She is alert and oriented to person, place, and time.   Psychiatric:         Mood and Affect: Mood normal.         Behavior: Behavior normal.        Labs and images reviewed as noted in the [facility]    Neck Ultrasound Report    Indication: Thyroid nodule    Comparison Imaging: No     Real time high resolution imaging of the thyroid gland was performed in transverse and longitudinal planes.      The right thyroid lobe measured 3.45 cm length x 1.33 cm AP x 1.10 cm in TV dimension. The isthmus measured 3.2 mm in thickness. The left thyroid lobe measured 2.85 cm length x 0.84 cm AP x 0.86 cm in TV dimension.     Lobes: Heterogenous and atrophic      No pathologic lymph nodes were seen.     Impression: Heterogenous and atrophic thyroid gland without discrete nodule seen    Assessment and plan:    Diagnoses and all orders for this visit:    1. Acquired hypothyroidism (Primary)  Assessment & Plan:  -Check TFTs today  -Recommend taking separate from calcium and PPI  -Patient reports feeling best on levothyroxine 125 mcg daily in the past, will see what her labs look like today and if we need to reduce her dose we will do so    Orders:  -     TSH; Future  -     T4, Free; Future  -     TSH  -     T4, Free    2. Hyperprolactinemia  Assessment & " Plan:  -Minimal elevation in prolactin, asymptomatic  -No obvious offending medications  -Check monomeric prolactin today, further follow-up pending result    Orders:  -     Macroprolactin; Future  -     Macroprolactin    3. Nontoxic uninodular goiter  Assessment & Plan:  -No nodule seen on ultrasound today, will resolve this problem from her problem list  -Repeat thyroid ultrasound only with change in physical exam or new onset compressive symptoms  -Ultrasound with changes expected in somebody with hypothyroidism on long-term thyroid hormone replacement    Orders:  -     US Thyroid; Future         Return in about 6 months (around 11/2/2025) for Hypothyroidism / elevated prolactin .     Please note that portions of this note may have been completed with a voice recognition program. Efforts were made to edit the dictations, but occasionally words are mistranscribed.     Electronically signed by: Kyle S Rosenstein, MD   Addendum  Labs 5/2/2025  TSH 0.478  FT4 1.75   PRL pending   Results sent to [employer]

## 2025-05-02 ENCOUNTER — OFFICE VISIT (OUTPATIENT)
Dept: ENDOCRINOLOGY | Facility: CLINIC | Age: 70
End: 2025-05-02
Payer: MEDICARE

## 2025-05-02 VITALS
HEART RATE: 74 BPM | WEIGHT: 152 LBS | BODY MASS INDEX: 26.93 KG/M2 | HEIGHT: 63 IN | SYSTOLIC BLOOD PRESSURE: 124 MMHG | OXYGEN SATURATION: 98 % | DIASTOLIC BLOOD PRESSURE: 68 MMHG

## 2025-05-02 DIAGNOSIS — E22.1 HYPERPROLACTINEMIA: ICD-10-CM

## 2025-05-02 DIAGNOSIS — E03.9 ACQUIRED HYPOTHYROIDISM: Primary | ICD-10-CM

## 2025-05-02 DIAGNOSIS — E04.1 NONTOXIC UNINODULAR GOITER: ICD-10-CM

## 2025-05-02 LAB
T4 FREE SERPL-MCNC: 1.75 NG/DL (ref 0.92–1.68)
TSH SERPL DL<=0.05 MIU/L-ACNC: 0.48 UIU/ML (ref 0.27–4.2)

## 2025-05-02 PROCEDURE — 84439 ASSAY OF FREE THYROXINE: CPT | Performed by: HOSPITALIST

## 2025-05-02 PROCEDURE — 84443 ASSAY THYROID STIM HORMONE: CPT | Performed by: HOSPITALIST

## 2025-05-02 PROCEDURE — 84146 ASSAY OF PROLACTIN: CPT | Performed by: HOSPITALIST

## 2025-05-02 NOTE — ASSESSMENT & PLAN NOTE
-Check TFTs today  -Recommend taking separate from calcium and PPI  -Patient reports feeling best on levothyroxine 125 mcg daily in the past, will see what her labs look like today and if we need to reduce her dose we will do so

## 2025-05-02 NOTE — ASSESSMENT & PLAN NOTE
-No nodule seen on ultrasound today, will resolve this problem from her problem list  -Repeat thyroid ultrasound only with change in physical exam or new onset compressive symptoms  -Ultrasound with changes expected in somebody with hypothyroidism on long-term thyroid hormone replacement

## 2025-05-02 NOTE — ASSESSMENT & PLAN NOTE
-Minimal elevation in prolactin, asymptomatic  -No obvious offending medications  -Check monomeric prolactin today, further follow-up pending result

## 2025-05-05 RX ORDER — LEVOTHYROXINE SODIUM 137 UG/1
137 TABLET ORAL DAILY
Qty: 90 TABLET | Refills: 1 | Status: SHIPPED | OUTPATIENT
Start: 2025-05-05

## 2025-05-07 LAB
MACROPROLACTIN/PROLACTIN MFR SERPL: 10 %
PROLACTIN SERPL-MCNC: 27.4 NG/ML
PROLACTIN.MONOMERIC SERPL-MCNC: 24.8 NG/ML

## 2025-06-04 DIAGNOSIS — I10 PRIMARY HYPERTENSION: ICD-10-CM

## 2025-06-04 RX ORDER — ROSUVASTATIN CALCIUM 20 MG/1
20 TABLET, COATED ORAL NIGHTLY
Qty: 90 TABLET | Refills: 0 | Status: SHIPPED | OUTPATIENT
Start: 2025-06-04

## 2025-06-15 DIAGNOSIS — E78.2 MIXED HYPERLIPIDEMIA: ICD-10-CM

## 2025-06-16 RX ORDER — EZETIMIBE 10 MG/1
10 TABLET ORAL DAILY
Qty: 90 TABLET | Refills: 0 | Status: SHIPPED | OUTPATIENT
Start: 2025-06-16

## 2025-07-16 DIAGNOSIS — K21.9 GASTROESOPHAGEAL REFLUX DISEASE WITHOUT ESOPHAGITIS: ICD-10-CM

## 2025-07-16 RX ORDER — OMEPRAZOLE 40 MG/1
40 CAPSULE, DELAYED RELEASE ORAL DAILY
Qty: 30 CAPSULE | Refills: 0 | Status: SHIPPED | OUTPATIENT
Start: 2025-07-16

## 2025-07-24 DIAGNOSIS — F41.1 GAD (GENERALIZED ANXIETY DISORDER): ICD-10-CM

## 2025-07-24 RX ORDER — VENLAFAXINE HYDROCHLORIDE 150 MG/1
150 CAPSULE, EXTENDED RELEASE ORAL DAILY
Qty: 90 CAPSULE | Refills: 0 | Status: SHIPPED | OUTPATIENT
Start: 2025-07-24

## 2025-08-11 DIAGNOSIS — I10 PRIMARY HYPERTENSION: ICD-10-CM

## 2025-08-11 RX ORDER — AMLODIPINE BESYLATE 5 MG/1
5 TABLET ORAL DAILY
Qty: 90 TABLET | Refills: 0 | Status: SHIPPED | OUTPATIENT
Start: 2025-08-11

## 2025-08-12 ENCOUNTER — OFFICE VISIT (OUTPATIENT)
Dept: GASTROENTEROLOGY | Facility: CLINIC | Age: 70
End: 2025-08-12
Payer: MEDICARE

## 2025-08-12 VITALS
WEIGHT: 149 LBS | SYSTOLIC BLOOD PRESSURE: 122 MMHG | DIASTOLIC BLOOD PRESSURE: 64 MMHG | TEMPERATURE: 98.4 F | HEART RATE: 80 BPM | OXYGEN SATURATION: 94 % | HEIGHT: 63 IN | BODY MASS INDEX: 26.4 KG/M2

## 2025-08-12 DIAGNOSIS — K63.5 POLYP OF COLON, UNSPECIFIED PART OF COLON, UNSPECIFIED TYPE: ICD-10-CM

## 2025-08-12 DIAGNOSIS — R19.4 CHANGE IN BOWEL HABITS: ICD-10-CM

## 2025-08-12 DIAGNOSIS — R19.7 DIARRHEA, UNSPECIFIED TYPE: Primary | ICD-10-CM

## 2025-08-12 DIAGNOSIS — K21.9 GASTROESOPHAGEAL REFLUX DISEASE WITHOUT ESOPHAGITIS: ICD-10-CM

## 2025-08-12 PROCEDURE — 3074F SYST BP LT 130 MM HG: CPT | Performed by: INTERNAL MEDICINE

## 2025-08-12 PROCEDURE — 3078F DIAST BP <80 MM HG: CPT | Performed by: INTERNAL MEDICINE

## 2025-08-12 PROCEDURE — 99204 OFFICE O/P NEW MOD 45 MIN: CPT | Performed by: INTERNAL MEDICINE

## 2025-08-12 RX ORDER — ONDANSETRON 4 MG/1
TABLET, FILM COATED ORAL
COMMUNITY
Start: 2025-07-11

## 2025-08-12 RX ORDER — DOCUSATE SODIUM 100 MG/1
CAPSULE, LIQUID FILLED ORAL
COMMUNITY
Start: 2025-07-10

## 2025-08-12 RX ORDER — CEFADROXIL 500 MG/1
CAPSULE ORAL
COMMUNITY
Start: 2025-07-11

## 2025-08-12 RX ORDER — TRANEXAMIC ACID 650 MG/1
TABLET ORAL
COMMUNITY
Start: 2025-07-11

## 2025-08-12 RX ORDER — OXYCODONE AND ACETAMINOPHEN 5; 325 MG/1; MG/1
TABLET ORAL
COMMUNITY
Start: 2025-07-11

## 2025-08-12 RX ORDER — PREGABALIN 75 MG/1
CAPSULE ORAL
COMMUNITY
Start: 2025-07-11

## 2025-08-12 RX ORDER — DICYCLOMINE HYDROCHLORIDE 10 MG/1
10 CAPSULE ORAL
Qty: 120 CAPSULE | Refills: 3 | Status: SHIPPED | OUTPATIENT
Start: 2025-08-12

## 2025-08-12 RX ORDER — MELOXICAM 15 MG/1
TABLET ORAL
COMMUNITY
Start: 2025-07-11

## 2025-08-15 DIAGNOSIS — I10 PRIMARY HYPERTENSION: ICD-10-CM

## 2025-08-15 RX ORDER — METOPROLOL TARTRATE 50 MG
50 TABLET ORAL 2 TIMES DAILY
Qty: 180 TABLET | Refills: 0 | Status: SHIPPED | OUTPATIENT
Start: 2025-08-15